# Patient Record
Sex: MALE | Race: WHITE | NOT HISPANIC OR LATINO | Employment: PART TIME | URBAN - METROPOLITAN AREA
[De-identification: names, ages, dates, MRNs, and addresses within clinical notes are randomized per-mention and may not be internally consistent; named-entity substitution may affect disease eponyms.]

---

## 2017-01-03 ENCOUNTER — GENERIC CONVERSION - ENCOUNTER (OUTPATIENT)
Dept: OTHER | Facility: OTHER | Age: 62
End: 2017-01-03

## 2017-01-10 ENCOUNTER — HOSPITAL ENCOUNTER (OUTPATIENT)
Dept: MRI IMAGING | Facility: HOSPITAL | Age: 62
Discharge: HOME | End: 2017-01-10
Attending: PHYSICAL MEDICINE & REHABILITATION
Payer: COMMERCIAL

## 2017-01-10 DIAGNOSIS — R29.90 UNSPECIFIED SYMPTOMS AND SIGNS INVOLVING THE NERVOUS SYSTEM: ICD-10-CM

## 2017-01-10 PROCEDURE — 72158 MRI LUMBAR SPINE W/O & W/DYE: CPT

## 2017-01-10 PROCEDURE — A9585 GADOBUTROL INJECTION: HCPCS | Performed by: PHYSICAL MEDICINE & REHABILITATION

## 2017-01-10 RX ADMIN — GADOBUTROL 8 ML: 604.72 INJECTION INTRAVENOUS at 23:03

## 2017-01-11 ENCOUNTER — GENERIC CONVERSION - ENCOUNTER (OUTPATIENT)
Dept: OTHER | Facility: OTHER | Age: 62
End: 2017-01-11

## 2017-01-20 ENCOUNTER — ALLSCRIPTS OFFICE VISIT (OUTPATIENT)
Dept: OTHER | Facility: OTHER | Age: 62
End: 2017-01-20

## 2017-01-24 ENCOUNTER — ALLSCRIPTS OFFICE VISIT (OUTPATIENT)
Dept: OTHER | Facility: OTHER | Age: 62
End: 2017-01-24

## 2017-01-31 ENCOUNTER — GENERIC CONVERSION - ENCOUNTER (OUTPATIENT)
Dept: OTHER | Facility: OTHER | Age: 62
End: 2017-01-31

## 2017-02-10 ENCOUNTER — ALLSCRIPTS OFFICE VISIT (OUTPATIENT)
Dept: RADIOLOGY | Facility: CLINIC | Age: 62
End: 2017-02-10
Payer: COMMERCIAL

## 2017-02-15 DIAGNOSIS — M79.605 PAIN OF LEFT LEG: ICD-10-CM

## 2017-02-15 DIAGNOSIS — G89.4 CHRONIC PAIN SYNDROME: ICD-10-CM

## 2017-02-15 DIAGNOSIS — M06.9 RHEUMATOID ARTHRITIS (HCC): ICD-10-CM
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
## 2017-02-15 DIAGNOSIS — M79.604 PAIN OF RIGHT LEG: ICD-10-CM

## 2017-02-15 DIAGNOSIS — M62.838 OTHER MUSCLE SPASM: ICD-10-CM

## 2017-02-22 ENCOUNTER — GENERIC CONVERSION - ENCOUNTER (OUTPATIENT)
Dept: OTHER | Facility: OTHER | Age: 62
End: 2017-02-22

## 2017-03-10 ENCOUNTER — ALLSCRIPTS OFFICE VISIT (OUTPATIENT)
Dept: OTHER | Facility: OTHER | Age: 62
End: 2017-03-10

## 2017-03-21 ENCOUNTER — GENERIC CONVERSION - ENCOUNTER (OUTPATIENT)
Dept: OTHER | Facility: OTHER | Age: 62
End: 2017-03-21

## 2017-03-28 ENCOUNTER — GENERIC CONVERSION - ENCOUNTER (OUTPATIENT)
Dept: OTHER | Facility: OTHER | Age: 62
End: 2017-03-28

## 2017-03-29 ENCOUNTER — ALLSCRIPTS OFFICE VISIT (OUTPATIENT)
Dept: OTHER | Facility: OTHER | Age: 62
End: 2017-03-29

## 2017-03-29 DIAGNOSIS — M54.16 RADICULOPATHY OF LUMBAR REGION: ICD-10-CM

## 2017-04-04 ENCOUNTER — ALLSCRIPTS OFFICE VISIT (OUTPATIENT)
Dept: OTHER | Facility: OTHER | Age: 62
End: 2017-04-04

## 2017-04-04 ENCOUNTER — GENERIC CONVERSION - ENCOUNTER (OUTPATIENT)
Dept: OTHER | Facility: OTHER | Age: 62
End: 2017-04-04

## 2017-04-14 ENCOUNTER — ALLSCRIPTS OFFICE VISIT (OUTPATIENT)
Dept: RADIOLOGY | Facility: CLINIC | Age: 62
End: 2017-04-14
Payer: COMMERCIAL

## 2017-04-21 ENCOUNTER — GENERIC CONVERSION - ENCOUNTER (OUTPATIENT)
Dept: OTHER | Facility: OTHER | Age: 62
End: 2017-04-21

## 2017-04-26 ENCOUNTER — GENERIC CONVERSION - ENCOUNTER (OUTPATIENT)
Dept: OTHER | Facility: OTHER | Age: 62
End: 2017-04-26

## 2017-05-08 ENCOUNTER — ALLSCRIPTS OFFICE VISIT (OUTPATIENT)
Dept: OTHER | Facility: OTHER | Age: 62
End: 2017-05-08

## 2017-05-30 ENCOUNTER — ALLSCRIPTS OFFICE VISIT (OUTPATIENT)
Dept: OTHER | Facility: OTHER | Age: 62
End: 2017-05-30

## 2017-06-09 ENCOUNTER — ALLSCRIPTS OFFICE VISIT (OUTPATIENT)
Dept: OTHER | Facility: OTHER | Age: 62
End: 2017-06-09

## 2017-06-09 ENCOUNTER — HOSPITAL ENCOUNTER (OUTPATIENT)
Dept: RADIOLOGY | Facility: MEDICAL CENTER | Age: 62
Discharge: HOME | End: 2017-06-09
Payer: COMMERCIAL

## 2017-06-09 ENCOUNTER — TRANSCRIBE ORDERS (OUTPATIENT)
Dept: ADMINISTRATIVE | Facility: HOSPITAL | Age: 62
End: 2017-06-09

## 2017-06-09 DIAGNOSIS — R07.81 PLEURODYNIA: ICD-10-CM

## 2017-06-09 DIAGNOSIS — S49.92XA UNSPECIFIED INJURY OF LEFT SHOULDER AND UPPER ARM, INITIAL ENCOUNTER: ICD-10-CM

## 2017-06-09 PROCEDURE — 71101 X-RAY EXAM UNILAT RIBS/CHEST: CPT

## 2017-06-13 ENCOUNTER — GENERIC CONVERSION - ENCOUNTER (OUTPATIENT)
Dept: OTHER | Facility: OTHER | Age: 62
End: 2017-06-13

## 2017-06-22 ENCOUNTER — GENERIC CONVERSION - ENCOUNTER (OUTPATIENT)
Dept: OTHER | Facility: OTHER | Age: 62
End: 2017-06-22

## 2017-07-03 ENCOUNTER — GENERIC CONVERSION - ENCOUNTER (OUTPATIENT)
Dept: OTHER | Facility: OTHER | Age: 62
End: 2017-07-03

## 2017-07-03 ENCOUNTER — APPOINTMENT (OUTPATIENT)
Dept: RADIOLOGY | Age: 62
End: 2017-07-03
Attending: FAMILY MEDICINE
Payer: COMMERCIAL

## 2017-07-03 ENCOUNTER — OFFICE VISIT (OUTPATIENT)
Dept: URGENT CARE | Age: 62
End: 2017-07-03
Payer: COMMERCIAL

## 2017-07-03 DIAGNOSIS — S49.92XA UNSPECIFIED INJURY OF LEFT SHOULDER AND UPPER ARM, INITIAL ENCOUNTER: ICD-10-CM

## 2017-07-03 PROCEDURE — 73060 X-RAY EXAM OF HUMERUS: CPT

## 2017-07-03 PROCEDURE — 73030 X-RAY EXAM OF SHOULDER: CPT

## 2017-07-03 PROCEDURE — S9083 URGENT CARE CENTER GLOBAL: HCPCS | Performed by: FAMILY MEDICINE

## 2017-07-03 PROCEDURE — G0383 LEV 4 HOSP TYPE B ED VISIT: HCPCS | Performed by: FAMILY MEDICINE

## 2017-07-07 ENCOUNTER — ALLSCRIPTS OFFICE VISIT (OUTPATIENT)
Dept: RADIOLOGY | Facility: CLINIC | Age: 62
End: 2017-07-07
Payer: COMMERCIAL

## 2017-07-13 ENCOUNTER — GENERIC CONVERSION - ENCOUNTER (OUTPATIENT)
Dept: OTHER | Facility: OTHER | Age: 62
End: 2017-07-13

## 2017-07-17 ENCOUNTER — GENERIC CONVERSION - ENCOUNTER (OUTPATIENT)
Dept: OTHER | Facility: OTHER | Age: 62
End: 2017-07-17

## 2017-08-10 ENCOUNTER — ALLSCRIPTS OFFICE VISIT (OUTPATIENT)
Dept: OTHER | Facility: OTHER | Age: 62
End: 2017-08-10

## 2017-08-10 DIAGNOSIS — Z79.899 OTHER LONG TERM (CURRENT) DRUG THERAPY: ICD-10-CM

## 2017-08-10 DIAGNOSIS — M05.79 RHEUMATOID ARTHRITIS OF MULTIPLE SITES WITHOUT ORGAN OR SYSTEM INVOLVEMENT WITH POSITIVE RHEUMATOID FACTOR (HCC): ICD-10-CM

## 2017-08-15 ENCOUNTER — ALLSCRIPTS OFFICE VISIT (OUTPATIENT)
Dept: OTHER | Facility: OTHER | Age: 62
End: 2017-08-15

## 2017-08-18 ENCOUNTER — TRANSCRIBE ORDERS (OUTPATIENT)
Dept: ADMINISTRATIVE | Facility: HOSPITAL | Age: 62
End: 2017-08-18

## 2017-08-18 ENCOUNTER — HOSPITAL ENCOUNTER (OUTPATIENT)
Dept: RADIOLOGY | Facility: HOSPITAL | Age: 62
Discharge: HOME | End: 2017-08-18
Payer: COMMERCIAL

## 2017-08-18 ENCOUNTER — GENERIC CONVERSION - ENCOUNTER (OUTPATIENT)
Dept: OTHER | Facility: OTHER | Age: 62
End: 2017-08-18

## 2017-08-18 DIAGNOSIS — M05.79 RHEUMATOID ARTHRITIS OF MULTIPLE SITES WITHOUT ORGAN OR SYSTEM INVOLVEMENT WITH POSITIVE RHEUMATOID FACTOR (HCC): ICD-10-CM

## 2017-08-18 PROCEDURE — 71020 HB CHEST X-RAY 2VW FRONTAL&LATL: CPT

## 2017-08-23 ENCOUNTER — GENERIC CONVERSION - ENCOUNTER (OUTPATIENT)
Dept: OTHER | Facility: OTHER | Age: 62
End: 2017-08-23

## 2017-09-13 ENCOUNTER — ALLSCRIPTS OFFICE VISIT (OUTPATIENT)
Dept: OTHER | Facility: OTHER | Age: 62
End: 2017-09-13

## 2017-09-13 DIAGNOSIS — Z79.899 OTHER LONG TERM (CURRENT) DRUG THERAPY: ICD-10-CM

## 2017-09-13 DIAGNOSIS — R76.11 NONSPECIFIC REACTION TO TUBERCULIN SKIN TEST WITHOUT ACTIVE TUBERCULOSIS(795.51): ICD-10-CM

## 2017-09-13 DIAGNOSIS — G89.4 CHRONIC PAIN SYNDROME: ICD-10-CM

## 2017-09-13 DIAGNOSIS — F11.20 UNCOMPLICATED OPIOID DEPENDENCE (HCC): ICD-10-CM

## 2017-09-18 ENCOUNTER — TRANSCRIBE ORDERS (OUTPATIENT)
Dept: ADMINISTRATIVE | Facility: HOSPITAL | Age: 62
End: 2017-09-18

## 2017-09-18 DIAGNOSIS — M96.1 POSTLAMINECTOMY SYNDROME, UNSPECIFIED REGION: Primary | ICD-10-CM

## 2017-09-18 DIAGNOSIS — M47.812 CERVICAL SPONDYLOSIS WITHOUT MYELOPATHY: ICD-10-CM

## 2017-09-29 ENCOUNTER — HOSPITAL ENCOUNTER (OUTPATIENT)
Dept: RADIOLOGY | Facility: HOSPITAL | Age: 62
Discharge: HOME | End: 2017-09-29
Attending: ANESTHESIOLOGY
Payer: COMMERCIAL

## 2017-09-29 ENCOUNTER — ALLSCRIPTS OFFICE VISIT (OUTPATIENT)
Dept: OTHER | Facility: OTHER | Age: 62
End: 2017-09-29

## 2017-09-29 DIAGNOSIS — M96.1 POSTLAMINECTOMY SYNDROME, UNSPECIFIED REGION: ICD-10-CM

## 2017-09-29 PROCEDURE — A9585 GADOBUTROL INJECTION: HCPCS | Performed by: ANESTHESIOLOGY

## 2017-09-29 PROCEDURE — 72158 MRI LUMBAR SPINE W/O & W/DYE: CPT

## 2017-09-29 RX ADMIN — GADOBUTROL 8 ML: 604.72 INJECTION INTRAVENOUS at 14:05

## 2017-10-03 ENCOUNTER — GENERIC CONVERSION - ENCOUNTER (OUTPATIENT)
Dept: OTHER | Facility: OTHER | Age: 62
End: 2017-10-03

## 2017-10-05 ENCOUNTER — GENERIC CONVERSION - ENCOUNTER (OUTPATIENT)
Dept: OTHER | Facility: OTHER | Age: 62
End: 2017-10-05

## 2017-10-09 ENCOUNTER — GENERIC CONVERSION - ENCOUNTER (OUTPATIENT)
Dept: OTHER | Facility: OTHER | Age: 62
End: 2017-10-09

## 2017-10-10 ENCOUNTER — ALLSCRIPTS OFFICE VISIT (OUTPATIENT)
Dept: RADIOLOGY | Facility: CLINIC | Age: 62
End: 2017-10-10
Payer: COMMERCIAL

## 2017-10-12 ENCOUNTER — GENERIC CONVERSION - ENCOUNTER (OUTPATIENT)
Dept: OTHER | Facility: OTHER | Age: 62
End: 2017-10-12

## 2017-10-13 RX ORDER — ACETAMINOPHEN 325 MG/1
650 TABLET ORAL ONCE
Status: COMPLETED | OUTPATIENT
Start: 2017-10-16 | End: 2017-10-16

## 2017-10-13 RX ORDER — DIPHENHYDRAMINE HCL 25 MG
25 TABLET ORAL ONCE
Status: COMPLETED | OUTPATIENT
Start: 2017-10-16 | End: 2017-10-16

## 2017-10-13 RX ORDER — SODIUM CHLORIDE 9 MG/ML
20 INJECTION, SOLUTION INTRAVENOUS CONTINUOUS
Status: DISCONTINUED | OUTPATIENT
Start: 2017-10-16 | End: 2017-10-19 | Stop reason: HOSPADM

## 2017-10-16 ENCOUNTER — HOSPITAL ENCOUNTER (OUTPATIENT)
Dept: INFUSION CENTER | Facility: CLINIC | Age: 62
Discharge: HOME | End: 2017-10-16
Payer: COMMERCIAL

## 2017-10-16 VITALS
BODY MASS INDEX: 26.38 KG/M2 | TEMPERATURE: 98 F | SYSTOLIC BLOOD PRESSURE: 136 MMHG | RESPIRATION RATE: 18 BRPM | HEART RATE: 95 BPM | WEIGHT: 173.5 LBS | DIASTOLIC BLOOD PRESSURE: 93 MMHG

## 2017-10-16 PROCEDURE — 96413 CHEMO IV INFUSION 1 HR: CPT

## 2017-10-16 RX ORDER — OXYCODONE HYDROCHLORIDE AND ACETAMINOPHEN 5; 325 MG/1; MG/1
1 TABLET ORAL EVERY 4 HOURS PRN
COMMUNITY
End: 2018-05-04 | Stop reason: DRUGHIGH

## 2017-10-16 RX ORDER — ORPHENADRINE CITRATE 100 MG/1
100 TABLET, EXTENDED RELEASE ORAL 3 TIMES DAILY
COMMUNITY
End: 2018-03-07 | Stop reason: SDUPTHER

## 2017-10-16 RX ORDER — ESOMEPRAZOLE MAGNESIUM 40 MG/1
40 CAPSULE, DELAYED RELEASE ORAL
COMMUNITY
End: 2018-02-17 | Stop reason: SDUPTHER

## 2017-10-16 RX ORDER — MULTIVITAMIN WITH IRON
1100 TABLET ORAL
COMMUNITY
End: 2021-01-19

## 2017-10-16 RX ORDER — UBIDECARENONE 75 MG
CAPSULE ORAL DAILY
COMMUNITY

## 2017-10-16 RX ORDER — GABAPENTIN 600 MG/1
1800 TABLET ORAL
COMMUNITY

## 2017-10-16 RX ORDER — PREDNISONE 2.5 MG
5 TABLET ORAL DAILY
COMMUNITY
End: 2018-10-04 | Stop reason: DRUGHIGH

## 2017-10-16 RX ORDER — CELECOXIB 200 MG/1
200 CAPSULE ORAL 2 TIMES DAILY
COMMUNITY

## 2017-10-16 RX ORDER — LANOLIN ALCOHOL/MO/W.PET/CERES
1 CREAM (GRAM) TOPICAL 3 TIMES DAILY
COMMUNITY

## 2017-10-16 RX ADMIN — ACETAMINOPHEN 650 MG: 325 TABLET, FILM COATED ORAL at 13:47

## 2017-10-16 RX ADMIN — SODIUM CHLORIDE 20 ML/HR: 0.9 INJECTION, SOLUTION INTRAVENOUS at 13:46

## 2017-10-16 RX ADMIN — TOCILIZUMAB 310 MG: 20 INJECTION, SOLUTION, CONCENTRATE INTRAVENOUS at 14:32

## 2017-10-16 RX ADMIN — DIPHENHYDRAMINE HCL 25 MG: 25 TABLET ORAL at 13:46

## 2017-10-16 NOTE — PROGRESS NOTES
Pt received treatment without complications  Discharged in stable condition  Next appointment scheduled in 4 weeks  AVS provided

## 2017-10-16 NOTE — PLAN OF CARE
Problem: Potential for Falls  Goal: Patient will remain free of falls  INTERVENTIONS:  - Assess patient frequently for physical needs  -  Identify cognitive and physical deficits and behaviors that affect risk of falls    -  [place] fall precautions as indicated by assessment   - Educate patient/family on patient safety including physical limitations  - Instruct patient to call for assistance with activity based on assessment  - Modify environment to reduce risk of injury  - Consider OT/PT consult to assist with strengthening/mobility   Outcome: Progressing

## 2017-10-16 NOTE — PROGRESS NOTES
Pt relayed extensive story regarding positive TB result  Stating that he was cleared by all doctors after having repeat negative TB test result  Labs were performed in August and September, yet pt was delayed in receiving his first treatment until now due to pt being cleared by physicians following negative TB result  Physician note states that he opted for subcutaneous injections every two weeks, yet pt states that after trying to obtain authorizations, they opted to receive infusions instead  Pt ok for treatment today

## 2017-10-26 ENCOUNTER — GENERIC CONVERSION - ENCOUNTER (OUTPATIENT)
Dept: OTHER | Facility: OTHER | Age: 62
End: 2017-10-26

## 2017-11-13 RX ORDER — ACETAMINOPHEN 325 MG/1
650 TABLET ORAL ONCE
Status: DISCONTINUED | OUTPATIENT
Start: 2017-11-14 | End: 2017-11-17 | Stop reason: HOSPADM

## 2017-11-13 RX ORDER — SODIUM CHLORIDE 9 MG/ML
20 INJECTION, SOLUTION INTRAVENOUS CONTINUOUS
Status: DISCONTINUED | OUTPATIENT
Start: 2017-11-14 | End: 2017-11-17 | Stop reason: HOSPADM

## 2017-11-13 RX ORDER — DIPHENHYDRAMINE HCL 25 MG
25 TABLET ORAL ONCE
Status: DISCONTINUED | OUTPATIENT
Start: 2017-11-14 | End: 2017-11-17 | Stop reason: HOSPADM

## 2017-11-14 ENCOUNTER — HOSPITAL ENCOUNTER (OUTPATIENT)
Dept: INFUSION CENTER | Facility: CLINIC | Age: 62
Discharge: HOME | End: 2017-11-14
Payer: COMMERCIAL

## 2017-11-21 ENCOUNTER — GENERIC CONVERSION - ENCOUNTER (OUTPATIENT)
Dept: OTHER | Facility: OTHER | Age: 62
End: 2017-11-21

## 2017-11-30 ENCOUNTER — GENERIC CONVERSION - ENCOUNTER (OUTPATIENT)
Dept: OTHER | Facility: OTHER | Age: 62
End: 2017-11-30

## 2017-12-14 ENCOUNTER — HOSPITAL ENCOUNTER (OUTPATIENT)
Dept: RADIOLOGY | Age: 62
Discharge: HOME | End: 2017-12-14
Payer: COMMERCIAL

## 2017-12-14 ENCOUNTER — GENERIC CONVERSION - ENCOUNTER (OUTPATIENT)
Dept: OTHER | Facility: OTHER | Age: 62
End: 2017-12-14

## 2017-12-14 DIAGNOSIS — M47.812 CERVICAL SPONDYLOSIS WITHOUT MYELOPATHY: ICD-10-CM

## 2017-12-14 PROCEDURE — 72141 MRI NECK SPINE W/O DYE: CPT

## 2017-12-15 ENCOUNTER — GENERIC CONVERSION - ENCOUNTER (OUTPATIENT)
Dept: FAMILY MEDICINE CLINIC | Facility: CLINIC | Age: 62
End: 2017-12-15

## 2018-01-09 NOTE — PROGRESS NOTES
Assessment    1  Abnormal neurological exam (781 99) (R29 90)   2  Spondylolisthesis, grade 1 (738 4) (M43 10)    Plan  Abnormal neurological exam, Analgesic use, Chronic pain syndrome, Spondylolisthesis,  grade 1    · 1 - Jenna Mao MD (Pain Management) Physician Referral  Evaluate and treat, new  injury possible LESI and needs follow up , patient had been actvely managed by Dr Tremaine Castaneda  Status: Active  Requested for: 20Jan2017  Care Summary provided  : Yes  Levoscoliosis, Spondylolisthesis, grade 1    · Follow-up visit in 6 weeks Evaluation and Treatment  Follow-up  Status: Hold For -  Scheduling  Requested for: 20Jan2017    Discussion/Summary  Impression: degenerative disc disease of the lumbar spine and spondylolisthesis  Currently, the condition is unchanged  There are no changes in medication management  Per Pain Medicine Treatment plan includes pain medicine consultation and may benefit from epidural injections which should be easier this tyime after prior laminectomy  Patient discussion: discussed with the patient, discussed with the patient's family  Chief Complaint  Follow up for post op lumbar spine rehab   12/25 last seen Dec 2015 and D/Cd to PRN care  Here for back  1/20 /17 follow up after MRI  History of Present Illness  62 yo male with greater than one month right buttock radiating down to knee, new numbness and tingling in L4 distribution  LISBETH 5/12/2015  today 8/20 has had surgery Dr Lavell Cabral Ashlie 15, 2015, L-2 thru L-5  Has weaned from 10 mg oxycodones to 5 mg, taking 4 a day  Referred for rehab after surgery by Dr Jesica Camp  9/17 follow up   takes opioid four times a day with supplemental APAP  Working part time at [employer]  10/15 state he does HEP per PT  Leg feels stronger  Works three half days  No radicular Sx  some longstanding numbness in toes (multiple surgeries  Analgesics four times a day, no constipation, no drowsiness     12/3 follow for right upper lumbar radiculopathy due to HNP, surgery was successful Ashlie 15,   Pt  and his wife here today with ongoing concern for ongoing multiple sites of pain, wants a pain Medicine consult   sent in past by PCP to pain Medicine but not seen   on gabapentin for "restless legs" at HS only  Is taking one tramadol in am, oxycodone at noon, evening meal tramadol and then oxycodone at HS  Prior to surgery from Dr Debra Ferrera was taking tramadol 50 mg one tab TID  Now taking left sided sciatic pain for a week, feels tight , uses hot tub for stretching  Never had post op PT   had one visit and told HEP adequate  He thinks his femoral component has shifted in last week     12/25 re-eval feels he is "leaning to right " for about two months   denies pain avoidance  thought was van seat  unsure if discs or muscles  getting some right LBP and lateral hip pain, pain around surgical site from prior lami  Denies any Sx below knees and no N/T or BBI  Still treats with Pain Medicine and Claudia Álvarez of Rheumatology  Feels he is no longer able to lift 25 # dehumidifier   tolerates 15 # max   gets help with work  In August had a fall and trauma eval at [facility] and thoracic and lumbar spine films without fracture  1/20/17 follow up after MRI   does not have follow-up with Pain Medicine and is needing more analgesics, had been on chronic tramadol from Rheumatology prior to pain medicine  Is awaiting a new motorized scooter thru [facility] clinic  Review of Systems    Musculoskeletal: as noted in HPI  ROS reviewed  Active Problems    1  Abnormal neurological exam (781 99) (R29 90)   2  Acute hip pain (719 45) (M25 559)   3  Acute upper respiratory infection (465 9) (J06 9)   4  Analgesic use (V58 69) (Z79 899)   5  Bruise (924 9) (T14 8)   6  Chronic pain syndrome (338 4) (G89 4)   7  Chronic steroid use (V58 65)   8  DDD (degenerative disc disease), lumbosacral (722 52) (M51 37)   9  Depression screen (V79 0) (Z13 89)   10   Encounter for screening for malignant neoplasm of colon (V76 51) (Z12 11)   11  Esophageal reflux (530 81) (K21 9)   12  Fall with injury (959 9,E888 9) (W19 XXXA)   15  Left ankle swelling (719 07) (M25 472)   14  Left leg swelling (729 81) (M79 89)   15  Leg hematoma (924 5) (S80 10XA)   16  Levoscoliosis (737 39) (M41 80)   17  Low back pain (724 2) (M54 5)   18  Multiple joint pain (719 49) (M25 50)   19  Myalgia (729 1) (M79 1)   20  Neoplasm of skin (239 2) (D49 2)   21  Opioid dependence (304 00) (F11 20)   22  Other muscle spasm (728 85) (M62 838)   23  Pain in both lower extremities (729 5) (M79 604,M79 605)   24  Reactive airway disease (493 90) (J45 909)   25  Restless legs syndrome (333 94) (G25 81)   26  Rheumatoid arthritis (714 0) (M06 9)   27  Skin rash (782 1) (R21)   28  Sleep apnea (780 57) (G47 30)   29  Visual disturbances (368 9) (H53 9)    Past Medical History    1  History of Acute right lumbar radiculopathy (724 4) (M54 16)   2  History of arthritis (V13 4) (Z87 39)   3  History of esophageal reflux (V12 79) (Z87 19)   4  History of Nonvenomous Insect Bite Of Right Upper Arm (912 4)   5  History of Visit For: Screening Exam Parasitic Infections (V75 8)    The active problems and past medical history were reviewed and updated today  Surgical History    1  History of Foot Surgery   2  History of Knee Surgery   3  History of Palatopharyngoplasty   4  History of Peripheral Nerve Block Wrist Radial Right   5  History of Sinus Surgery   6  History of Tonsillectomy With Adenoidectomy   7  History of Umbilical Hernia Repair    The surgical history was reviewed and updated today  Family History  Mother    1  Family history of congestive heart failure (V17 49) (Z82 49)  Father    2  Family history of transient ischemic attacks (V17 1) (Z82 3)  Family History    3   Family history of cerebrovascular accident (V17 1) (Z82 3)    Social History    · Alcohol use (V49 89) (Z78 9)   · Being A Social Drinker   · Drinks coffee   · Never A Smoker   · No drug use    Current Meds   1  B-Complex TABS; Therapy: (Recorded:07Nov2012) to Recorded   2  Celecoxib 200 MG Oral Capsule; TAKE 1 CAPSULE DAILY AS NEEDED; Last   Rx:10Jun2015 Ordered   3  Collagen Hydrolysate Powder; Therapy: (Recorded:55Ntk3482) to Recorded   4  Enbrel 50 MG/ML SOLN; INJECT 50MG SUBCUTANEOUSLY WEEKLY Recorded   5  Endocet 5-325 MG Oral Tablet; TAKE 1 TABLET EVERY 5- 6 HOURS; Therapy: 30Kpt9957 to (Evaluate:40Lrv8488); Last Rx:20Oct2016 Ordered   6  Esomeprazole Magnesium 40 MG Oral Capsule Delayed Release; TAKE 1 CAPSULE   DAILY AS NEEDED FOR GASTROESOPHAGEAL REFLUX DISEASE; Therapy: 08NZR0193 to (Last KI:93JRE7998)  Requested for: 49GDY9571 Ordered   7  Gabapentin 600 MG Oral Tablet; Therapy: (Recorded:77Jcl6798) to Recorded   8  Glucosamine TABS; Therapy: (0523-0392656) to Recorded   9  Orphenadrine Citrate  MG Oral Tablet Extended Release 12 Hour; TAKE 1   TABLET THREE TIMES A DAY AS NEEDED FOR MUSCLE SPASM; Therapy: 52RJR4029 to (Evaluate:53Cin4012)  Requested for: 05DPJ7812; Last   Rx:33Nyq2310 Ordered   10  Sleep Oral Capsule; Therapy: 09RFJ6203 to Recorded   11  Vitamin B12 TABS; Therapy: (0523-9660272) to Recorded    The medication list was reviewed and updated today  Allergies    1  Morphine Derivatives   2  Penicillins   3  Sulfa Drugs    Vitals  Signs   Recorded: 20Jan2017 04:12PM   Heart Rate: 84  Systolic: 802  Diastolic: 80  Height: 5 ft 10 in  Weight: 172 lb   BMI Calculated: 24 68  BSA Calculated: 1 96    Physical Exam    Constitutional   General appearance: Abnormal   chronically ill  Lumbar/Sacral Spine examination demonstrates Lumbosacral Spine:   Evaluation of Muscle Stretch Reflexes on the right side demonstrates 1/4 Hamstring Reflex, 0/4 Knee Jerk Reflex and 1/4 Ankle Jerk Reflex, but negative right ankle clonus     Evaluation of Muscle Stretch Reflexes on the left side demonstrates 1/4 Hamstring Reflex, 1/4 Knee Jerk Reflex and negative left ankle clonus  Special Tests: negative Straight Leg Raise on right and negative Straight Leg Raise on left  Results/Data    Diagnostic Review multilevel DDD, no root compression, slight retrolisthesis at L-4/5   did not have fusion        Signatures   Electronically signed by : Jonathan Yi DO; Jan 20 2017  4:47PM EST                       (Author)

## 2018-01-09 NOTE — RESULT NOTES
Verified Results  (1) VITAMIN D 25-HYDROXY 29Dec2016 03:20PM [employer] Order Number: [id number]     Test Name Result Flag Reference   VIT D 25-HYDROX 50 1 ng/mL  30 0-100 0   This assay is a certified procedure of the CDC Vitamin D Standardization Certification Program (VDSCP)     Deficiency <20ng/ml   Insufficiency 20-30ng/ml   Sufficient  ng/ml     *Patients undergoing fluorescein dye angiography may retain small amounts of fluorescein in the body for 48-72 hours post procedure  Samples containing fluorescein can produce falsely elevated Vitamin D values  If the patient had this procedure, a specimen should be resubmitted post fluorescein clearance       (1) CK (CPK) 29Dec2016 03:20PM [employer] Order Number: [id number]     Test Name Result Flag Reference   CK (CPK) 203 U/L     CK MB FRACTION 5 1 ng/mL H 0 0-5 0   CK-MB INDEX 2 5 %  0 0-2 5

## 2018-01-09 NOTE — RESULT NOTES
Message   Recorded as Task   Date: 06/22/2016 01:37 PM, Created By: Pratima Churchill   Task Name: Follow Up   Assigned To: [facility]   Regarding Patient: Emily Hoff, Status: In Progress   Comment:    Sachi Rihcardsy - 22 Jun 2016 1:37 PM     TASK CREATED  Caller: Self; General Medical Question; [phone] (Home); [phone] (Work)  Received a TC from the pt  today stating [facility] the [facility] from [facility] for the Gabapentin 300mg at HS  His previous doctor Dr Clyda Schlatter? previously ordered it  He would like HD to continue to order  Does his PCP have to cancel the order and you order it  How do we fix this? Renee Elmore - 22 Jun 2016 2:40 PM     TASK REPLIED TO: Previously Assigned To [facility]  Other than order it myself, I cannot change anything  Can you please check with pharmacist why I cannot take over prescription   Fransisca Richards - 22 Jun 2016 3:09 PM     TASK EDITED  S/w [facility] pharmacy and it looks like the gabapentin 100mg tablets were ordered  Attempted to call pt  and clarify exactly how much gabapentin he is taking  Need to call [employer] and see why insurance denied gabapentin at hs  Fransisca Richards - 22 Jun 2016 3:09 PM     TASK IN PROGRESS   Fransisca Richards - 22 Jun 2016 4:21 PM     TASK EDITED  Received a vm from the pt  today stating he needs a new script from Dr Rodolfo Tadeo in regards to his HS gabapentin sent to his mailorder to cancel out the previous doctor  [facility] to advise  Thanks   Renee Elmore - 22 Jun 2016 4:32 PM     TASK REPLIED TO: Previously Assigned To Renee Elmore  Sent to Mail order   Fransisca Richards - 23 Jun 2016 12:03 PM     TASK EDITED  S/w the pt  and the problem lies with Dr Sondra Tai order of the gabapentin  The pt  takes 8- 100mg tablets of gabapentin QD, and 3- 300mg tablets of the Gabapentin QD  The pt  states he has no more of the 100mg tablets left  I guess there was a problem with the insurance because of the two providers ordering   I know the 300mg tablets were ordered through mail order but in the meantime the pt has none of the 100mg tablets left  Could you please order the 100mg tablets to the local pharmacy because Dr Crys Clinton is off today  Thanks   Diane Ram - 23 Jun 2016 1:51 PM     TASK REPLIED TO: Previously Assigned To Diane Ram  sure  I will take care of it  sent   Jacquie Chen - 23 Jun 2016 1:55 PM     TASK REPLIED TO: Previously Assigned To [facility]  Spoke with pt and advised of the same,verbalizes understanding  Signatures   Electronically signed by :  Wilfrid Amos, ; Jun 23 2016  1:55PM EST                       (Author)

## 2018-01-09 NOTE — MISCELLANEOUS
Message   Recorded as Task   Date: 01/15/2016 03:00 PM, Created By: Karla Forbes   Task Name: Follow Up   Assigned To: [facility]   Regarding Patient: Jarett Reza, Status: Active   Comment:    Jacquie Chen - 15 Lenny 2016 3:00 PM     TASK CREATED  Caller: Self; Other; [phone] (Mobile Phone); [phone] (Work)  Tc from pt who states he just left our office and has script for nucynta 200mg ER q12hrs  States he has called 6 different pharmacies and noone has this medication in stock and that they may be on back order x 2 weeks  Pt states he has 4 days worth left of nucynta  [facility] do not [facility] any 200mg and only have #10 tabs of the 100mg  I called [facility] pharmacy-they have no 200mg in stock,states pt would have to order and they do not know when it would be delivered  They do have #74 tabs of nucynta 100mg ER  Please advise  Haroon Gomez - 15 Lenny 2016 3:58 PM     TASK REPLIED TO: Previously Assigned To [facility]  Please ask patient to call [phone]  They can help him locate pharmacy which can order the medication for him sooner  In the meantime he can can use 100 mg ER 2 tabs Q12  Jacquie Chen - 15 Lenny 2016 4:10 PM     TASK REPLIED TO: Previously Assigned To [facility]  Ok  Pt will need script for 100mg correct? Haroon Gomez - 18 Jan 2016 9:23 AM     TASK REPLIED TO: Previously Assigned To Renee Maria  Can you please check with patient if he was able to locate the Nucynta  200 mg? [address] - 18 Jan 2016 10:46 AM     TASK EDITED  S/W pt; he said he was told by the pharmacist at [facility] and at another [facility] pharmacy that the nucynta ER 200mg have been on back order and Q 2wks the back order date gets pushed back another 2 wks, no one seems to know when the nucynta ER 200mg will actually be available      Pt said he has enough of his nucynta ER 50mg and 150mg ER for 4 more days yet, he takes ER 150mg Q12hrs  He will need more by fri  Pt doesn't have any 100mg ER nucynta, said he never did  Please advise  Renee Maria - 18 Jan 2016 12:40 PM     TASK REPLIED TO: Previously Assigned To Renee Maria  I will prescribe Nucynta 100 mg ER Q12, he can take 2 tabs Q12  In the mean time, they can order the 200 mg ER  Guerline Bobo - 18 Jan 2016 1:11 PM     TASK EDITED  Left vm on pt's cell (as per his request)   that Dr Alana Barrios will prescribe Nucynta 100mg ER, and he's to take 2 tabs Q12hrs and in the meantime his pharmacy can order the nucynta 200mg ER  Script can be p/u between 8-4pm M-F at the [facility] office  If pt has any questions he's to call the [facility] office  Guerline Bobo - 18 Jan 2016 1:11 PM     TASK IN PROGRESS   Saumya Harrison - 18 Jan 2016 3:00 PM     TASK EDITED    Pt LM on [facility] triage line today at , states that Neisha Rodriguez will be able to get #30 pills, states he is not sure about the next script  States that should work for now  States if they do not have the other 30, he will call [facility] for the 100's  **********   Renee Maria - 18 Jan 2016 3:41 PM     TASK REPLIED TO: Previously Assigned To Sabino Stiles  Thank you  He should just remind us about only picking up 30 tabs and phamacy should document that  Efrain Mueller - 18 Jan 2016 4:20 PM     TASK EDITED  LMOM to cb  Efrain Mueller - 18 Jan 2016 4:20 PM     TASK IN PROGRESS   Guerline Bobo - 20 Jan 2016 11:34 AM     TASK EDITED  Pt left vm 1/20 at 1055 that Dr Alana Barrios needs to get this message and get back to him and she has to call [address]  He said the Nucynta referral service screwed up and said the other location  had the 200mg but they only have the 100mg  Pt said Dr Alana Barrios has to rewrite for the nucynta 100mg and [facility], then they can have it for him for tomorrow  Pt said he only has enough through tomorrow night and would be out by fri morning  Guerline Bobo - 20 Jan 2016 11:40 AM     TASK EDITED  Addendum: pt called back and said that Obed at [facility] told pt that if Dr Shady Pena wrote the script and she called them directly they would get the nucynta ordered today so it would come by tomorrow  Pt aware his wife will have to come to the [facility] office to p/u a new script and take it to [facility], he's aware she'll have to p/u Rx by 4pm    Pt told we will call his cell, ok to leave vm once Rx ready for p/u  Renee Maria - 20 Jan 2016 11:45 AM     TASK REPLIED TO: Previously Assigned To Renee Maria  Spoke to [facility] at [facility]  They will order for 60 tabs, this will last approximately 2 weeks  After this, he can get 200 mg ER ordered ( he was told this would take 2 weeks)  Prescription was ready on 1/18  I will double check in the front  Guerline Bobo - 20 Jan 2016 12:20 PM     TASK EDITED  S/W pt and advised of the same  Pt's wife will p/u nucynta ER 100mg RX today at [facility] office  Pt instructed that the RX will say to take 1 tab Q12hrs but Dr Shady Pnea wants him to take 2 tabs Q12hrs, and this order for #60 tabs will last him 2 wks  Pt questioning what will happen in 2 wks b/c he'll be taking more than what the Rx was written for and won't the ins question that when he goes to get the next RX filled? Pt said he still has the 2 months of Rx for the nucynta ER 200mg but the 200mg ER remains on back order indefinitely from what he's been told by 2 different pharmacies  I placed pt on hold and discussed w/ Dr Shady Pena further -> Dr Shady Pena advises pt to fill Rx for the nucynta ER 100mg and take 2 tabs Q12hrs, she will contact Nucynta to inquire about the back order of the nucynta ER 200mg and then she will call the pt  Pt to hold onto the 2 months of RXs for nucynta ER 200mg for now per Dr Shady Pena  Pt appreciative of Dr Leila Hoover with all of this     Renee Maria - 20 Jan 2016 12:44 PM     TASK REPLIED TO: Previously Assigned To (NexIUM); TAKE 1   CAPSULE DAILY AS NEEDED FOR GERD; Therapy: 24SKX7214 to (Last Kasie Núñez)  Requested for: 17TZP5026 Ordered   8  Gabapentin 300 MG Oral Capsule; take 3 capsules at bedtime; Therapy: 52OBN4286 to (Last Rx:10Jun2015)  Requested for: 10Jun2015 Ordered   9  Glucosamine TABS; Therapy: (450 45 295) to Recorded   10  Shira CAPS Recorded   11  Minocycline HCl - 100 MG Oral Tablet; Therapy: (450 45 295) to Recorded   12  Nucynta  MG Oral Tablet Extended Release 12 Hour; Take 1 tablet every 12    hours; Therapy: 27OXZ9586 to (Evaluate:23Cnp7909); Last Rx:18Jan2016 Ordered   13  Orphenadrine Citrate  MG Oral Tablet Extended Release 12 Hour; TAKE 1    TABLET Every twelve hours PRN muscle spasm; Therapy: 11QSS0224 to (Evaluate:61Nis8345); Last Rx:43Jjn2364 Ordered   14  Orphenadrine Citrate  MG Oral Tablet Extended Release 12 Hour; TAKE 1    TABLET THREE TIMES A DAY AS NEEDED FOR MUSCLE SPASM; Therapy: 87KKA8017 to (Last Rx:12Jun2015)  Requested for: 12Jun2015 Ordered   15  PredniSONE 10 MG Oral Tablet; TAKE 1 TABLET DAILY; Therapy: (450 45 295) to Recorded   16  Sleep Oral Capsule; Therapy: 02DZG9805 to Recorded   17  TraMADol HCl - 50 MG Oral Tablet; Take 1 to 2 tablets PO up to 3 times a day as    needed for pain; Therapy: 01YCI6433 to (Evaluate:02Jan2016); Last Rx:33Zyz3616 Ordered   18  Vitamin B12 TABS; Therapy: (Recorded:07Nov2012) to Recorded    Allergies    1  Morphine Derivatives   2  Penicillins   3   Sulfa Drugs    Signatures   Electronically signed by : David Burrows, ; Jan 20 2016  1:20PM EST                       (Author)

## 2018-01-09 NOTE — PROGRESS NOTES
History of Present Illness  Care Coordination Encounter Information:   Type of Encounter: Telephonic   Contact: Follow-Up    Spoke to Patient  Care Coordination SL Nurse [employer] On license of [facility]:   The reason for call is to discuss outreach for follow up/needed services  Outreach TC to patient for CM assessment and update of scheduling with rheumatology  Patient reports his RA pain is 8/10 even with using his endocet, gabapentin, and Celebrex  Patient has been off Enbrel x 3 weeks  Patient will be getting his biologic infusion after 10/13  patient's last dose od Enbrel was 9/13  There is a waiting period of 30 days after Enbrel for patient to receive the biologic due to med interactions  Patient states understanding of why biologic can not be given until after 10/13  Patient has an appointment on 10/5 with pain management and this CM suggested patient discuss short term pain relief measures with pain management until biologic can be administered and takes effect  Patient verbalized understanding  Reviewed role of CM and contact information  Active Problems    1  (QFT) QuantiFERON-TB test reaction without active tuberculosis (795 52) (R76 12)   2  Abnormal neurological exam (781 99) (R29 90)   3  Acute hip pain (719 45) (M25 559)   4  Analgesic use (V58 69) (Z79 899)   5  Bruise (924 9) (T14 8XXA)   6  Chronic pain syndrome (338 4) (G89 4)   7  Chronic steroid use (V58 65)   8  DDD (degenerative disc disease), lumbosacral (722 52) (M51 37)   9  Depression screen (V79 0) (Z13 89)   10  Esophageal reflux (530 81) (K21 9)   11  Fall with injury (959 9,E888 9) (W19 XXXA)   12  Generalized osteoarthritis (715 00) (M15 9)   13  Hip pain, right (719 45) (M25 551)   14  Insomnia (780 52) (G47 00)   15  Latent tuberculosis (795 51) (R76 11)   16  Left ankle swelling (719 07) (M25 472)   17  Left leg swelling (729 81) (M79 89)   18  Leg hematoma (924 5) (S80 10XA)   19  Levoscoliosis (737 39) (M41 80)   20   Low back pain (724 2) (M54 5)   21  Lumbar post-laminectomy syndrome (722 83) (M96 1)   22  Lumbar radiculopathy (724 4) (M54 16)   23  Multiple joint pain (719 49) (M25 50)   24  Myalgia (729 1) (M79 1)   25  Neoplasm of skin (239 2) (D49 2)   26  On etanercept therapy (V58 69) (Z79 899)   27  Opioid dependence (304 00) (F11 20)   28  Other muscle spasm (728 85) (M62 838)   29  Reactive airway disease (493 90) (J45 909)   30  Restless legs syndrome (333 94) (G25 81)   31  Rheumatoid arthritis involving multiple sites with positive rheumatoid factor (714 0)    (M05 79)   32  Rib pain on right side (786 50) (R07 81)   33  Shoulder injury, left, initial encounter (959 2) (S49 92XA)   34  Sleep apnea (780 57) (G47 30)   35  Spondylolisthesis, grade 1 (738 4) (M43 10)   36  Therapeutic drug monitoring (V58 83) (Z51 81)   37  Visual disturbances (368 9) (H53 9)    Past Medical History    1  History of Acute right lumbar radiculopathy (724 4) (M54 16)   2  History of Acute upper respiratory infection (465 9) (J06 9)   3  History of Encounter for screening for malignant neoplasm of colon (V76 51) (Z12 11)   4  History of arthritis (V13 4) (Z87 39)   5  History of esophageal reflux (V12 79) (Z87 19)   6  History of rheumatoid arthritis (V13 4) (Z87 39)   7  History of Nonvenomous Insect Bite Of Right Upper Arm (912 4)   8  History of Pain in both lower extremities (729 5) (M79 604,M79 605)   9  History of Pain of left shoulder region (719 41) (M25 512)   10  History of Skin rash (782 1) (R21)   11  History of Visit For: Screening Exam Parasitic Infections (V75 8)    Surgical History    1  History of Elbow Surgery   2  History of Foot Surgery   3  History of Knee Surgery   4  History of Palatopharyngoplasty   5  History of Peripheral Nerve Block Wrist Radial Right   6  History of Sinus Surgery   7  History of Tonsillectomy With Adenoidectomy   8  History of Umbilical Hernia Repair    Family History  Mother    1   Family history of congestive heart failure (V17 49) (Z82 49)  Father    2  Family history of transient ischemic attacks (V17 1) (Z82 3)  Brother    3  Family history of gout (V18 19) (Z82 69)  Family History    4  Family history of cerebrovascular accident (V17 1) (Z82 3)   5  Denied: Family history of Crohn's disease   6  Denied: Family history of psoriasis   7  Denied: Family history of rheumatoid arthritis   8  Denied: Family history of systemic lupus erythematosus   9  Denied: Family history of ulcerative colitis    Social History    · Alcohol use (V49 89) (Z78 9)   · Being A Social Drinker   · Drinks coffee   · Never A Smoker   · No drug use    Current Meds    1  Gabapentin 300 MG Oral Capsule; Two tabs in a m  4 in PM;   Therapy: (Recorded:13Sep2017) to Recorded    2  Endocet 7 5-325 MG Oral Tablet; TAKE 1 TABLET EVERY 6 HOURS AS NEEDED FOR   PAIN;   Therapy: 28Nku0858 to (Evaluate:14Oct2017); Last Rx:88Fde8296 Ordered    3  Esomeprazole Magnesium 40 MG Oral Capsule Delayed Release (NexIUM); TAKE 1   CAPSULE DAILY AS NEEDED FOR GASTROESOPHAGEAL REFLUX DISEASE; Therapy: 23GWZ2634 to (Last Rx:23Fdt7602)  Requested for: 23Wix6761 Ordered    4  Zolpidem Tartrate 10 MG Oral Tablet; TAKE 1 TABLET AT BEDTIME AS NEEDED FOR   SLEEP; Therapy: 50GMA7762 to (96 740455); Last Rx:93Rtd4480 Ordered    5  Orphenadrine Citrate  MG Oral Tablet Extended Release 12 Hour; TAKE 1   TABLET THREE TIMES A DAY AS NEEDED FOR MUSCLE SPASM; Therapy: 38AHP9295 to (Last Rx:72Yxl3379)  Requested for: 54Jnf5468 Ordered    6  PredniSONE 5 MG Oral Tablet; take 2 tablet daily; Therapy: (Recorded:13Sep2017) to Recorded    7  CeleBREX 200 MG Oral Capsule (Celecoxib); Take 1 capsule twice daily; Therapy: (Recorded:10Aug2017) to Recorded    8  Allergy TABS; Therapy: (Recorded:13Sep2017) to Recorded    Allergies    1  Penicillins   2  Morphine Derivatives   3  Sulfa Drugs    End of Encounter Meds    1  Gabapentin 300 MG Oral Capsule;  Two tabs in a m  4 in PM;   Therapy: (Recorded:54Cbo6503) to Recorded    2  Endocet 7 5-325 MG Oral Tablet; TAKE 1 TABLET EVERY 6 HOURS AS NEEDED FOR   PAIN;   Therapy: 59Lzy5969 to (Evaluate:14Oct2017); Last Rx:92Wrb0850 Ordered    3  Esomeprazole Magnesium 40 MG Oral Capsule Delayed Release (NexIUM); TAKE 1   CAPSULE DAILY AS NEEDED FOR GASTROESOPHAGEAL REFLUX DISEASE; Therapy: 08ATG9995 to (Last Rx:26Pwc4402)  Requested for: 92Xcc4963 Ordered    4  Zolpidem Tartrate 10 MG Oral Tablet; TAKE 1 TABLET AT BEDTIME AS NEEDED FOR   SLEEP; Therapy: 79KWM8466 to (21 ); Last Rx:17Zgp8064 Ordered    5  Orphenadrine Citrate  MG Oral Tablet Extended Release 12 Hour; TAKE 1   TABLET THREE TIMES A DAY AS NEEDED FOR MUSCLE SPASM; Therapy: 46PRF9396 to (Last Rx:60Rex5675)  Requested for: 55Qca3977 Ordered    6  PredniSONE 5 MG Oral Tablet; take 2 tablet daily; Therapy: (Recorded:81Xpn6836) to Recorded    7  CeleBREX 200 MG Oral Capsule (Celecoxib); Take 1 capsule twice daily; Therapy: (Recorded:10Aug2017) to Recorded    8  Allergy TABS; Therapy: (Recorded:42Ygv0612) to Recorded    Future Appointments    Date/Time Provider Specialty Site   10/05/2017 03:00 PM MARIEL Cox Pain Management [facility] SPINE   11/24/2017 03:00 PM Josefa Darby [facility]   10/10/2017 10:15 AM [address] [facility] June   Pain Management [place] OUTPATIENT     Patient Care Team    Care Team Member Role Specialty Office Number   Edgard ROBERTS MD  Rheumatology [phone]   Que Renteria DO Specialist Physiatry [phone]   Refgerri Hummel DO Attending Family Medicine [phone]   Jonathan Charles  Nurse Practitioner [phone]   UWPRKEHR Madalyn Barlow MD  Pain Management [phone]     Signatures   Electronically signed by : Marques Mendez RN; Oct  3 2017 11:38AM EST                       (Author)

## 2018-01-10 NOTE — RESULT NOTES
Verified Results  XR ENTIRE SPINE 2-3 VIEW ( scoliosis) 87Yae4371 03:18PM Stephanie Lassiter Order Number: [id number]     Test Name Result Flag Reference   XR ENTIRE SPINE 2-3 VW (scoliosis) (Report)     SCOLIOSIS      INDICATION: Back pain  History of arthritis     COMPARISON: Lumbar spine x-rays on 7/22/2015     VIEWS: Erect PA and lateral views thoracolumbar spine; 8 images     FINDINGS:     There is no fracture, pathologic bone lesion or congenital segmentation anomaly  Bilateral hip prostheses are noted  There is thoracolumbar scoliosis  Rotatory levoscoliosis of lumbar spine present  Measuring from the inferior endplate of G19, to the tibia the inferior endplate of L5, the Portillo angle is 24 degrees  There is extra scoliosis of the thoracic spine  Measuring from the superior endplate of T4 to the inferior endplate of V56, Portillo angle is 16 degrees   Discogenic degenerative changes of the thoracolumbar spine noted       IMPRESSION:     Thoracolumbar scoliosis as detailed above       Workstation performed: [id number]     Signed by:   Juanito Kearney MD   1/3/17

## 2018-01-10 NOTE — MISCELLANEOUS
Message  Spoke with patient in regard to his positive TB test  Patient was made aware that he will need a chest x-ray and to schedule appointment with infectious disease for further treatment of latent TB  Patient did voice understanding and states that he will follow-up at our office this afternoon to  orders for both the x-ray and referral  Patient did voice understanding and thanked me  Plan  Rheumatoid arthritis involving multiple sites with positive rheumatoid factor    · * XR CHEST PA & LATERAL; Status:Active; Requested for:12Mxt3535;    · 2 - Elly SNYDER, Don Cook  (Infectious Disease) Co-Management  *Please evaluate and treat for  positive tb test and latent tb  thanks  Status: Hold For - Scheduling  Requested for:  18GUY6648  Care Summary provided  : Yes    Signatures   Electronically signed by : Karen Jimenez, [facility];  Aug 18 2017 10:02AM EST                       (Author)

## 2018-01-10 NOTE — PROGRESS NOTES
Assessment    1  Abnormal neurological exam (781 99) (R29 90)   2  Levoscoliosis (737 39) (M41 80)    Plan  Abnormal neurological exam    · * MRI LUMBAR SPINE W WO CONTRAST; Status:Need Information - Financial  Authorization; Requested for:90Caw9559; Analgesic use, Myalgia    · (1) VITAMIN D 25-HYDROXY; Status:Active; Requested for:20Hrg6780;   Levoscoliosis    · XR ENTIRE SPINE 2-3 VIEW ( scoliosis); Status:Active; Requested for:98Jjo2516;   Myalgia    · (1) ALDOLASE; Status:Active; Requested for:97Xop6353;    · (1) CK (CPK); Status:Active; Requested for:56Ocr2144;     Discussion/Summary  Impression: low back pain  The diagnostic plan includes lumbar spine MRI and blood tests  There are no changes in medication management  Patient discussion: discussed with the patient, discussed with the patient's family  Chief Complaint  Follow up for post op lumbar spine rehab   12/25 last seen Dec 2015 and D/Cd ti PRN care  Here for back  History of Present Illness  64 yo male with greater than one month right buttock radiating down to knee, new numbness and tingling in L4 distribution  LISBETH 5/12/2015  today 8/20 has had surgery Dr Emily Dent Ashlie 15, 2015, L-2 thru L-5  Has weaned from 10 mg oxycodones to 5 mg, taking 4 a day  Referred for rehab after surgery by Dr Andie Shahid  9/17 follow up   takes opioid four times a day with supplemental APAP  Working part time at [employer]  10/15 state he does HEP per PT  Leg feels stronger  Works three half days  No radicular Sx  some longstanding numbness in toes (multiple surgeries  Analgesics four times a day, no constipation, no drowsiness  12/3 follow for right upper lumbar radiculopathy due to HNP, surgery was successful Ashlie 15,   Pt  and his wife here today with ongoing concern for ongoing multiple sites of pain, wants a pain Medicine consult   sent in past by PCP to pain Medicine but not seen   on gabapentin for "restless legs" at HS only   Is taking one tramadol in am, oxycodone at noon, evening meal tramadol and then oxycodone at HS  Prior to surgery from Dr Petr Horton was taking tramadol 50 mg one tab TID  Now taking left sided sciatic pain for a week, feels tight , uses hot tub for stretching  Never had post op PT   had one visit and told HEP adequate  He thinks his femoral component has shifted in last week     12/25 re-eval feels he is "leaning to right " for about two months   denies pain avoidance  thought was van seat  unsure if discs or muscles  getting some right LBP and lateral hip pain, pain around surgical site from prior lami  Denies any Sx below knees and no N/T or BBI  Still treats with Pain Medicine and Yolanda Lopez of Rheumatology  Feels he is no longer able to lift 25 # dehumidifier   tolerates 15 # max   gets help with work  In August had a fall and trauma eval at [facility] and thoracic and lumbar spine films without fracture  Review of Systems    Musculoskeletal: as noted in HPI  ROS reviewed  Active Problems    1  Abnormal neurological exam (781 99) (R29 90)   2  Acute hip pain (719 45) (M25 559)   3  Acute upper respiratory infection (465 9) (J06 9)   4  Analgesic use (V58 69) (Z79 899)   5  Bruise (924 9) (T14 8)   6  Chronic pain syndrome (338 4) (G89 4)   7  Chronic steroid use (V58 65)   8  DDD (degenerative disc disease), lumbosacral (722 52) (M51 37)   9  Depression screen (V79 0) (Z13 89)   10  Encounter for screening for malignant neoplasm of colon (V76 51) (Z12 11)   11  Esophageal reflux (530 81) (K21 9)   12  Fall with injury (959 9,E888 9) (W19 XXXA)   15  Left ankle swelling (719 07) (M25 472)   14  Left leg swelling (729 81) (M79 89)   15  Leg hematoma (924 5) (S80 10XA)   16  Low back pain (724 2) (M54 5)   17  Multiple joint pain (719 49) (M25 50)   18  Myalgia (729 1) (M79 1)   19  Neoplasm of skin (239 2) (D49 2)   20  Opioid dependence (304 00) (F11 20)   21  Other muscle spasm (469 84) (C90 315)   22   Pain in both lower extremities (729 5) (M79 604,M79 605)   23  Reactive airway disease (493 90) (J45 909)   24  Restless legs syndrome (333 94) (G25 81)   25  Rheumatoid arthritis (714 0) (M06 9)   26  Skin rash (782 1) (R21)   27  Sleep apnea (780 57) (G47 30)   28  Visual disturbances (368 9) (H53 9)    Past Medical History    1  History of Acute right lumbar radiculopathy (724 4) (M54 16)   2  History of arthritis (V13 4) (Z87 39)   3  History of esophageal reflux (V12 79) (Z87 19)   4  History of Nonvenomous Insect Bite Of Right Upper Arm (912 4)   5  History of Visit For: Screening Exam Parasitic Infections (V75 8)    The active problems and past medical history were reviewed and updated today  Surgical History    1  History of Foot Surgery   2  History of Knee Surgery   3  History of Palatopharyngoplasty   4  History of Peripheral Nerve Block Wrist Radial Right   5  History of Sinus Surgery   6  History of Tonsillectomy With Adenoidectomy   7  History of Umbilical Hernia Repair    The surgical history was reviewed and updated today  Family History  Mother    1  Family history of congestive heart failure (V17 49) (Z82 49)  Father    2  Family history of transient ischemic attacks (V17 1) (Z82 3)  Family History    3  Family history of cerebrovascular accident (V17 1) (Z82 3)    The family history was reviewed and updated today  Social History    · Alcohol use (V49 89) (Z78 9)   · Being A Social Drinker   · Drinks coffee   · Never A Smoker   · No drug use  The social history was reviewed and is unchanged  Current Meds   1  B-Complex TABS; Therapy: (Recorded:07Nov2012) to Recorded   2  Celecoxib 200 MG Oral Capsule; TAKE 1 CAPSULE DAILY AS NEEDED; Last   Rx:10Jun2015 Ordered   3  Collagen Hydrolysate Powder; Therapy: (Recorded:67Vuo7908) to Recorded   4  Enbrel 50 MG/ML SOLN; INJECT 50MG SUBCUTANEOUSLY WEEKLY Recorded   5  Endocet 5-325 MG Oral Tablet; TAKE 1 TABLET EVERY 5- 6 HOURS;    Therapy: 90Fep1876 to (Evaluate:02Fjy5171); Last Rx:67Xsj2931 Ordered   6  Esomeprazole Magnesium 40 MG Oral Capsule Delayed Release; TAKE 1 CAPSULE   DAILY AS NEEDED FOR GASTROESOPHAGEAL REFLUX DISEASE; Therapy: 26CNA7263 to (Last TN:80PMP5306)  Requested for: 46TTX2571 Ordered   7  Gabapentin 600 MG Oral Tablet; Therapy: (Recorded:61Fii0839) to Recorded   8  Glucosamine TABS; Therapy: (044 803 99 21) to Recorded   9  Orphenadrine Citrate  MG Oral Tablet Extended Release 12 Hour; TAKE 1   TABLET THREE TIMES A DAY AS NEEDED FOR MUSCLE SPASM; Therapy: 77KOY6188 to (Evaluate:68Qvg7434)  Requested for: 81UGP0552; Last   Rx:68Vpu7762 Ordered   10  Sleep Oral Capsule; Therapy: 63SYL5287 to Recorded   11  Vitamin B12 TABS; Therapy: (600 803 99 21) to Recorded    The medication list was reviewed and updated today  Allergies    1  Morphine Derivatives   2  Penicillins   3  Sulfa Drugs    Vitals  Signs   Recorded: 76Rre2361 02:10PM   Heart Rate: 84  Systolic: 394  Diastolic: 80  Height: 5 ft 10 in  Weight: 179 lb   BMI Calculated: 25 68  BSA Calculated: 1 99    Physical Exam        Lumbar/Sacral Spine examination demonstrates Lumbosacral Spine:   Marked TTP bilateral lumbar PVM  Evaluation of Muscle Stretch Reflexes on the right side demonstrates 1/4 Hamstring Reflex, 1/4 Knee Jerk Reflex and 1/4 Ankle Jerk Reflex, but negative right ankle clonus  Evaluation of Muscle Stretch Reflexes on the left side demonstrates 1/4 Hamstring Reflex, 1/4 Knee Jerk Reflex, 1/4 Ankle Jerk Reflex and negative left ankle clonus  Special Tests: positive Jose Daniel test on right and positive Jose Daniel test on left, but negative Straight Leg Raise on right and negative Straight Leg Raise on left  Results/Data  I personally reviewed the films/images/results in the office today  My interpretation follows  X-ray Review no images  Monia Le Monia Le Monia Le T spine and L- spine no fractures  levoscoliosis        Signatures   Electronically signed by : Melchor Putnam DO; Dec 29 2016  2:51PM EST                       (Author)

## 2018-01-10 NOTE — RESULT NOTES
Message   Recorded as Task   Date: 06/21/2016 12:36 PM, Created By: Tracie Chen   Task Name: Med Renewal Request   Assigned To: [facility]   Regarding Patient: Randy Rosario, Status: In Progress   Comment:    Fransisca Richards - 21 Jun 2016 12:36 PM     TASK CREATED  Caller: Self; Renew Medication; [phone] (Home); [phone] (Work)  Received a TC from the pt  today requesting a refill on his gabapentin 100mg, 2 capsules TID  Last script was on 5/5  His next povs is on 7/16  He [facility] to advise  thanks   Yolis Pace - 21 Jun 2016 4:51 PM     TASK REPLIED TO: Previously Assigned To [facility],Team  This had been ordered by PCP previously  was [facility] to send it  Please add it and I can send it   Yolis Watkinsu - 21 Jun 2016 4:53 PM     TASK REASSIGNED: Previously Assigned To [facility],Team  Please disregard previous response  Gabapentin sent to [facility]    Fransisca Richards - 22 Jun 2016 8:18 AM     TASK EDITED  Attempted to call pt  and left a detailed mom in regards to refill     Fransisca Richards - 22 Jun 2016 8:18 AM     TASK IN PROGRESS        Signatures   Electronically signed by : Lesley Purvis, ; Jun 22 2016  8:38AM EST                       (Author)

## 2018-01-11 NOTE — PROGRESS NOTES
History of Present Illness  Care Coordination Encounter Information:   Type of Encounter: Telephonic   Contact: Follow-Up    Spoke to Patient  Care Coordination SL Nurse Maria Antonia Salamanca:   The reason for call is to discuss outreach for follow up/needed services  Outreach TC to patient for CM assessment  Conversation was very long & convoluted  Patient had multiple complaints regarding [address]  Patient did have his infusion of Actemra at [facility]  he then went to see Dr Rocky Lo in [place] for a 2nd opinion  Patient has decided to continue w/ this physician  Patient had an epidural at [facility] for treatment of his low back pain  Patient states that he is taking his Endocet, Celebrex, gabapentin & prednisone as instructed but still has pain 8/10  Patient states he needs additional acetaminophen just to function  Patient educated on max dose of acetaminophen w/ understanding verbalized  Patient is to take a sub q injection of Actemra on 10/30  Patient has also had an evaluation by Dr Ranulfo Rushing for pain management  Patient is agreeable to continued CM calls  Patient is not sure at this time if he will be continuing w/ [facility]  Active Problems    1  (QFT) QuantiFERON-TB test reaction without active tuberculosis (795 52) (R76 12)   2  Abnormal neurological exam (781 99) (R29 90)   3  Acute hip pain (719 45) (M25 559)   4  Analgesic use (V58 69) (Z79 899)   5  Bruise (924 9) (T14 8XXA)   6  Chronic pain syndrome (338 4) (G89 4)   7  Chronic steroid use (V58 65)   8  DDD (degenerative disc disease), lumbosacral (722 52) (M51 37)   9  Depression screen (V79 0) (Z13 89)   10  Esophageal reflux (530 81) (K21 9)   11  Fall with injury (959 9,E888 9) (W19 XXXA)   12  Generalized osteoarthritis (715 00) (M15 9)   13  Hip pain, right (719 45) (M25 551)   14  Insomnia (780 52) (G47 00)   15  Latent tuberculosis (795 51) (R76 11)   16   Left ankle swelling (719 07) (M25 472)   17  Left leg swelling (729 81) (M79 89)   18  Leg hematoma (924 5) (S80 10XA)   19  Levoscoliosis (737 39) (M41 80)   20  Low back pain (724 2) (M54 5)   21  Lumbar post-laminectomy syndrome (722 83) (M96 1)   22  Lumbar radiculopathy (724 4) (M54 16)   23  Multiple joint pain (719 49) (M25 50)   24  Myalgia (729 1) (M79 1)   25  Neoplasm of skin (239 2) (D49 2)   26  On etanercept therapy (V58 69) (Z79 899)   27  Opioid dependence (304 00) (F11 20)   28  Other muscle spasm (728 85) (M62 838)   29  Reactive airway disease (493 90) (J45 909)   30  Restless legs syndrome (333 94) (G25 81)   31  Rheumatoid arthritis involving multiple sites with positive rheumatoid factor (714 0)    (M05 79)   32  Rib pain on right side (786 50) (R07 81)   33  Shoulder injury, left, initial encounter (959 2) (S49 92XA)   34  Sleep apnea (780 57) (G47 30)   35  Spondylolisthesis, grade 1 (738 4) (M43 10)   36  Therapeutic drug monitoring (V58 83) (Z51 81)   37  Visual disturbances (368 9) (H53 9)    Past Medical History    1  History of Acute right lumbar radiculopathy (724 4) (M54 16)   2  History of Acute upper respiratory infection (465 9) (J06 9)   3  History of Encounter for screening for malignant neoplasm of colon (V76 51) (Z12 11)   4  History of arthritis (V13 4) (Z87 39)   5  History of esophageal reflux (V12 79) (Z87 19)   6  History of rheumatoid arthritis (V13 4) (Z87 39)   7  History of Nonvenomous Insect Bite Of Right Upper Arm (912 4)   8  History of Pain in both lower extremities (729 5) (M79 604,M79 605)   9  History of Pain of left shoulder region (719 41) (M25 512)   10  History of Skin rash (782 1) (R21)   11  History of Visit For: Screening Exam Parasitic Infections (V75 8)    Surgical History    1  History of Elbow Surgery   2  History of Foot Surgery   3  History of Knee Surgery   4  History of Palatopharyngoplasty   5  History of Peripheral Nerve Block Wrist Radial Right   6  History of Sinus Surgery   7  History of Tonsillectomy With Adenoidectomy   8  History of Umbilical Hernia Repair    Family History  Mother    1  Family history of congestive heart failure (V17 49) (Z82 49)  Father    2  Family history of transient ischemic attacks (V17 1) (Z82 3)  Brother    3  Family history of gout (V18 19) (Z82 69)  Family History    4  Family history of cerebrovascular accident (V17 1) (Z82 3)   5  Denied: Family history of Crohn's disease   6  Denied: Family history of psoriasis   7  Denied: Family history of rheumatoid arthritis   8  Denied: Family history of systemic lupus erythematosus   9  Denied: Family history of ulcerative colitis    Social History    · Alcohol use (V49 89) (Z78 9)   · Being A Social Drinker   · Drinks coffee   · Never A Smoker   · No drug use    Current Meds    1  Gabapentin 300 MG Oral Capsule; Two tabs in a m  4 in PM;   Therapy: (Recorded:45Kqa6078) to Recorded    2  Endocet 7 5-325 MG Oral Tablet; TAKE 1 TABLET EVERY 6 HOURS AS NEEDED FOR   PAIN;   Therapy: 07Fko1182 to (Evaluate:17Nce8178); Last Rx:05Oct2017 Ordered    3  Esomeprazole Magnesium 40 MG Oral Capsule Delayed Release (NexIUM); TAKE 1   CAPSULE DAILY AS NEEDED FOR GASTROESOPHAGEAL REFLUX DISEASE; Therapy: 99FBI2692 to (Last Rx:69Qra1116)  Requested for: 48Icj3730 Ordered    4  Zolpidem Tartrate 10 MG Oral Tablet; TAKE 1 TABLET AT BEDTIME AS NEEDED FOR   SLEEP; Therapy: 78RIN7205 to [phone]); Last Rx:52Uli2239 Ordered    5  Orphenadrine Citrate  MG Oral Tablet Extended Release 12 Hour; TAKE 1   TABLET THREE TIMES A DAY AS NEEDED FOR MUSCLE SPASM; Therapy: 59QDH9537 to (Last Rx:69Oni0332)  Requested for: 12Afa6379 Ordered    6  PredniSONE 5 MG Oral Tablet; take 2 tablet daily; Therapy: (Recorded:13Sep2017) to Recorded    7  CeleBREX 200 MG Oral Capsule (Celecoxib); Take 1 capsule twice daily; Therapy: (Recorded:10Aug2017) to Recorded    8  Allergy TABS;    Therapy: (Recorded:20Mlp5823) to Recorded    Allergies    1  Penicillins   2  Morphine Derivatives   3  Sulfa Drugs    End of Encounter Meds    1  Gabapentin 300 MG Oral Capsule; Two tabs in a m  4 in PM;   Therapy: (Recorded:99Wgd1659) to Recorded    2  Endocet 7 5-325 MG Oral Tablet; TAKE 1 TABLET EVERY 6 HOURS AS NEEDED FOR   PAIN;   Therapy: 49Eme4952 to (Evaluate:79Fch4569); Last Rx:05Oct2017 Ordered    3  Esomeprazole Magnesium 40 MG Oral Capsule Delayed Release (NexIUM); TAKE 1   CAPSULE DAILY AS NEEDED FOR GASTROESOPHAGEAL REFLUX DISEASE; Therapy: 84YGG5232 to (Last Rx:34Tjo3428)  Requested for: 21Aug2017 Ordered    4  Zolpidem Tartrate 10 MG Oral Tablet; TAKE 1 TABLET AT BEDTIME AS NEEDED FOR   SLEEP; Therapy: 69MOW5570 to ([phone]); Last Rx:20Ssz6981 Ordered    5  Orphenadrine Citrate  MG Oral Tablet Extended Release 12 Hour; TAKE 1   TABLET THREE TIMES A DAY AS NEEDED FOR MUSCLE SPASM; Therapy: 99EIE9547 to (Last Rx:05Sep2017)  Requested for: 05Sep2017 Ordered    6  PredniSONE 5 MG Oral Tablet; take 2 tablet daily; Therapy: (Recorded:82Jnm4469) to Recorded    7  CeleBREX 200 MG Oral Capsule (Celecoxib); Take 1 capsule twice daily; Therapy: (Recorded:10Aug2017) to Recorded    8  Allergy TABS;    Therapy: (Recorded:40Bwi5821) to Recorded    Future Appointments    Date/Time Provider Specialty Site   11/24/2017 03:00 PM Katie Ragsdale [facility] Rheumatology [address]     Patient Care Team    Care Team Member Role Specialty Office Number   Burnice Sessions D MD  Rheumatology [phone]   [facility] DO Specialist Physiatry [phone]   Anjali Funez DO Attending Family Medicine [phone]   Pelon Christina  Nurse Practitioner [phone]   [address] MD  Pain Management [phone]   Collette FLOYD  Pain Management [phone]     Signatures   Electronically signed by : Eamon Vela RN; Oct 26 2017 11:03AM EST (Author)

## 2018-01-11 NOTE — RESULT NOTES
Message   venous duplex of b/l legs wnl  Verified Results  VAS LOWER LIMB VENOUS DUPLEX STUDY, COMPLETE BILATERAL 55Uwi9475 04:34PM Bismark Najera Order Number: [id number]    - Patient Instructions: To schedule this appointment, please contact Central Scheduling at [phone]   Order Number: [id number]    - Patient Instructions: To schedule this appointment, please contact Central Scheduling at [phone]  Test Name Result Flag Reference   VAS LOWER LIMB VENOUS DUPLEX STUDY, COMPLETE BILATERAL (Report)     [facility] REPORT   CLINICAL:   Indications: Bilateral limb pain  Patient injured himself while working in the   kitchen, fixing the floor, slipped and fell on the wooden beam and bruised both   legs  FINDINGS:      Segment Right      Left          Impression    Impression       CFV   Normal (Patent) Normal (Patent)             CONCLUSION:   Impression:   RIGHT LOWER LIMB:   No evidence of acute or chronic deep vein thrombosis  No evidence of superficial thrombophlebitis noted  Doppler evaluation shows a normal response to augmentation maneuvers  Popliteal, posterior tibial and anterior tibial arterial Doppler waveforms are   triphasic  LEFT LOWER LIMB:   No evidence of acute or chronic deep vein thrombosis  No evidence of superficial thrombophlebitis noted  Doppler evaluation shows a normal response to augmentation maneuvers  Popliteal, posterior tibial and anterior tibial arterial Doppler waveforms are   triphasic        SIGNATURE:   Electronically Signed by: Teofilo Yañez on 2016-09-03 06:38:22 AM

## 2018-01-11 NOTE — MISCELLANEOUS
Message   Recorded as Task   Date: 01/26/2017 03:44 PM, Created By: Quan Oleary   Task Name: Care Coordination   Assigned To: [facility]   Regarding Patient: Sindy Mackey, Status: Active   Comment:    Efrain Mueller - 26 Jan 2017 3:44 PM     TASK CREATED  [facility]; Care Coordination; [phone]  [facility] 1/26/2017 @ 1230  [facility] re: gabapentin 300 mg, requesting 90 day supply instead of 30 day per Rosemarie Amador Pls cb, reference #[id number]   Fransisca Richards - 26 Jan 2017 4:25 PM     TASK EDITED   Krista Schumacher - 26 Jan 2017 4:36 PM     TASK REPLIED TO: Previously Assigned To Krista Schumacher  changed script to gabapentin 600 mg so he is to take 1 tab TID  [facility] today   Saumya Harrison - 27 Jan 2017 4:01 PM     TASK EDITED    I [facility] with [facility]  States pt received Gabapentin 300mg 1 cap in Am, 1 in Pm and 4 at [facility] on 1/5/17, states since the script was changed they will be sending script out on 2/1/17  I spoke with pt, advised of the change to Gabapentin 600mg  1 Cap TID  Pt states that dose will not work for him, he has restless leg syndrome and he take 1200 mg at night to help him sleep  States this is helping so he does not need to increase his narcotic medication  I advised that when he gets the new script he can continue to take Gabapentin 300 mg 1 cap in AM, 1 cap in PM and Gabapentin 600 mg 2 caps at HS  This will keep him at the same dose he was at  Advised I will discuss with Cary Tubbs and CB if there are any other recommendations  Pt verbalizes understanding  ***********   Tabby Hearn - 31 Jan 2017 1:07 PM     TASK REPLIED TO: Previously Assigned To Lonna Phoenix aware and callie Siddiqui Spring - 31 Jan 2017 1:51 PM     TASK REASSIGNED: Previously Assigned To [facility],Team        Active Problems    1  Abnormal neurological exam (781 99) (R29 90)   2   Acute hip pain (839 45) (X99 715) 3  Acute upper respiratory infection (465 9) (J06 9)   4  Analgesic use (V58 69) (Z79 899)   5  Bruise (924 9) (T14 8)   6  Chronic pain syndrome (338 4) (G89 4)   7  Chronic steroid use (V58 65)   8  DDD (degenerative disc disease), lumbosacral (722 52) (M51 37)   9  Depression screen (V79 0) (Z13 89)   10  Encounter for screening for malignant neoplasm of colon (V76 51) (Z12 11)   11  Esophageal reflux (530 81) (K21 9)   12  Fall with injury (959 9,E888 9) (W19 XXXA)   15  Left ankle swelling (719 07) (M25 472)   14  Left leg swelling (729 81) (M79 89)   15  Leg hematoma (924 5) (S80 10XA)   16  Levoscoliosis (737 39) (M41 80)   17  Low back pain (724 2) (M54 5)   18  Lumbar post-laminectomy syndrome (722 83) (M96 1)   19  Multiple joint pain (719 49) (M25 50)   20  Myalgia (729 1) (M79 1)   21  Neoplasm of skin (239 2) (D49 2)   22  Opioid dependence (304 00) (F11 20)   23  Other muscle spasm (728 85) (M62 838)   24  Pain in both lower extremities (729 5) (M79 604,M79 605)   25  Reactive airway disease (493 90) (J45 909)   26  Restless legs syndrome (333 94) (G25 81)   27  Rheumatoid arthritis (714 0) (M06 9)   28  Skin rash (782 1) (R21)   29  Sleep apnea (780 57) (G47 30)   30  Spondylolisthesis, grade 1 (738 4) (M43 10)   31  Visual disturbances (368 9) (H53 9)    Current Meds   1  B-Complex TABS; Therapy: (Recorded:07Nov2012) to Recorded   2  Celecoxib 200 MG Oral Capsule (CeleBREX); TAKE 1 CAPSULE DAILY AS NEEDED;   Last Rx:10Jun2015 Ordered   3  Collagen Hydrolysate Powder; Therapy: (Recorded:47Quy3797) to Recorded   4  Enbrel 50 MG/ML SOLN; INJECT 50MG SUBCUTANEOUSLY WEEKLY Recorded   5  Endocet 7 5-325 MG Oral Tablet; TAKE 1 TABLET EVERY 6 HOURS AS NEEDED FOR   PAIN;   Therapy: 11Vlv0979 to (Evaluate:24Mar2017); Last Rx:24Jan2017 Ordered   6  Esomeprazole Magnesium 40 MG Oral Capsule Delayed Release (NexIUM); TAKE 1   CAPSULE DAILY AS NEEDED FOR GASTROESOPHAGEAL REFLUX DISEASE;    Therapy: 40EFN3846 to (Last DI:28UUH5717)  Requested for: 00TOU4367 Ordered   7  Gabapentin 300 MG Oral Capsule; TAKE 2 CAPSULES 3 TIMES DAILY; Therapy: 91SAL0249 to (Evaluate:25Mar2017)  Requested for: 33OJS0599; Last   Rx:24Jan2017 Ordered   8  Gabapentin 600 MG Oral Tablet; TAKE 1 TABLET 3 times daily; Therapy: 79NHT8485 to (Og Gama)  Requested for: 26Jan2017; Last   Rx:26Jan2017 Ordered   9  Gabapentin 600 MG Oral Tablet; Therapy: (Recorded:24Jan2017) to Recorded   10  Glucosamine TABS; Therapy: (044 803 99 21) to Recorded   11  Orphenadrine Citrate  MG Oral Tablet Extended Release 12 Hour; TAKE 1    TABLET THREE TIMES A DAY AS NEEDED FOR MUSCLE SPASM; Therapy: 77VZH4967 to (Evaluate:27Tsl1694)  Requested for: 31EOI4782; Last    Rx:38Dcn2958 Ordered   12  Sleep Oral Capsule; Therapy: 16HCI8595 to Recorded   13  Vitamin B12 TABS; Therapy: (Recorded:07Nov2012) to Recorded    Allergies    1  Morphine Derivatives   2  Penicillins   3   Sulfa Drugs    Signatures   Electronically signed by : Bran Laird RN; Jan 31 2017  1:56PM EST                       (Author)

## 2018-01-12 VITALS
SYSTOLIC BLOOD PRESSURE: 142 MMHG | HEART RATE: 87 BPM | DIASTOLIC BLOOD PRESSURE: 93 MMHG | TEMPERATURE: 98.5 F | BODY MASS INDEX: 23.39 KG/M2 | HEIGHT: 70 IN | WEIGHT: 163.38 LBS

## 2018-01-12 VITALS
HEIGHT: 70 IN | BODY MASS INDEX: 24.34 KG/M2 | WEIGHT: 170 LBS | DIASTOLIC BLOOD PRESSURE: 90 MMHG | SYSTOLIC BLOOD PRESSURE: 162 MMHG

## 2018-01-12 VITALS
HEIGHT: 70 IN | HEART RATE: 86 BPM | TEMPERATURE: 99.3 F | WEIGHT: 176.38 LBS | DIASTOLIC BLOOD PRESSURE: 89 MMHG | BODY MASS INDEX: 25.25 KG/M2 | SYSTOLIC BLOOD PRESSURE: 136 MMHG

## 2018-01-12 VITALS
BODY MASS INDEX: 24.77 KG/M2 | HEART RATE: 79 BPM | SYSTOLIC BLOOD PRESSURE: 157 MMHG | DIASTOLIC BLOOD PRESSURE: 88 MMHG | WEIGHT: 173 LBS | TEMPERATURE: 99.4 F | HEIGHT: 70 IN

## 2018-01-12 VITALS
HEART RATE: 84 BPM | SYSTOLIC BLOOD PRESSURE: 118 MMHG | HEIGHT: 70 IN | WEIGHT: 172 LBS | BODY MASS INDEX: 24.62 KG/M2 | DIASTOLIC BLOOD PRESSURE: 80 MMHG

## 2018-01-12 NOTE — RESULT NOTES
Verified Results  * XR RIBS RIGHT W PA CHEST MIN 3 VIEWS 75UYM5263 03:15PM Praful Barone    Order Number: [id number]     Test Name Result Flag Reference   XR RIBS RIGHT W PA CHEST MIN 3 VIEWS (Report)     RIGHT RIBS AND CHEST - DUAL ENERGY     INDICATION: Pleurodynia  COMPARISON: Chest x-ray dated Ashlie 3, 2015  VIEWS: PA chest (including soft tissue/bone algorithms) and 3 views right hemithorax     IMAGES: 7     FINDINGS:     The cardiomediastinal silhouette is unremarkable  Lungs are clear  No pleural effusions  There is no pneumothorax  There is a nondisplaced fracture of the anterior right 7th rib  IMPRESSION:     1  No active pulmonary disease  2  Nondisplaced anterior right 7th rib fracture         Workstation performed: [id number]     Signed by:   Tata Washington MD   6/13/17

## 2018-01-12 NOTE — RESULT NOTES
Verified Results  (1) ALDOLASE 15Nvz2962 03:20PM Caroline Daniel Order Number: [id number]     Test Name Result Flag Reference   ALDOLASE 9 6 U/L  3 3 - 10 3   Performed at:  [address] [place] [address]  [id number]  : Milton Coyne MD, Phone:  [phone]

## 2018-01-13 VITALS
BODY MASS INDEX: 24.25 KG/M2 | WEIGHT: 169.38 LBS | HEART RATE: 84 BPM | DIASTOLIC BLOOD PRESSURE: 80 MMHG | SYSTOLIC BLOOD PRESSURE: 140 MMHG | HEIGHT: 70 IN

## 2018-01-13 VITALS
HEART RATE: 86 BPM | SYSTOLIC BLOOD PRESSURE: 153 MMHG | BODY MASS INDEX: 23.96 KG/M2 | DIASTOLIC BLOOD PRESSURE: 94 MMHG | WEIGHT: 167.38 LBS | HEIGHT: 70 IN

## 2018-01-13 VITALS
WEIGHT: 169.38 LBS | DIASTOLIC BLOOD PRESSURE: 91 MMHG | SYSTOLIC BLOOD PRESSURE: 126 MMHG | HEART RATE: 101 BPM | BODY MASS INDEX: 24.3 KG/M2
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
## 2018-01-13 NOTE — RESULT NOTES
Message   Recorded as Task   Date: 10/19/2017 01:36 PM, Created By: Lily Kearney   Task Name: Follow Up   Assigned To: [facility]   Regarding Patient: Jarett Reza, Status: Active   Comment:    Anahy Costa - 19 Oct 2017 1:36 PM     TASK CREATED  L/m to return call with %  of relief  S/p CAUDAL LISBETH #3- done 10/10/17 by Anahy Denton - 19 Oct 2017 1:36 PM     TASK EDITED   Lily Kearney - 20 Oct 2017 12:49 PM     TASK EDITED  Patient reports he received  50%relief from his procedure  Patient aware of the 2wk wait  F/u ccall next wk    S/p CAUDAL LISBETH done 10/10/17  [address] [place] [address] - 23 Oct 2017 4:21 PM     TASK REPLIED TO: Previously Assigned To Vahe Mcghee - 26 Oct 6744 29:31 AM     TASK EDITED  Patient reports that he is feeling a little better  Patient has f/u 11/29/17   Sabrina Mao - 26 Oct 2017 11:37 AM     TASK REPLIED TO: Previously Assigned To [place]  Thanks          Signatures   Electronically signed by : Gumaro Gutierrez, ; Oct 26 2017 11:41AM EST                       (Author)

## 2018-01-13 NOTE — RESULT NOTES
Message   Recorded as Task   Date: 04/20/2017 02:41 PM, Created By: Federico Camargo   Task Name: Follow Up   Assigned To: [facility]   Regarding Patient: Emily Hoff, Status: Active   Comment:    Anahy Costa - 20 Apr 2017 2:41 PM     TASK CREATED  L/m to return call  S/p REPEAT CAUDAL LISBETH- done 4/14/17 by Anahy Yeh - 20 Apr 2017 2:59 PM     TASK EDITED   Luis Thomas - 25 Apr 2017 2:17 PM     TASK EDITED  Pt reports 50-60% relief post inj  with an increase in ability to perform ADL's   but now has an awareness of lower back pain  F/U is on 5/30  Sabrina Mao - 25 Apr 2017 4:16 PM     TASK REPLIED TO: Previously Assigned To [place]  Thanks          Signatures   Electronically signed by : Brandy Ontiveros, ; Apr 26 2017  9:01AM EST                       (Author)

## 2018-01-13 NOTE — PROGRESS NOTES
Assessment    1  Lumbar post-laminectomy syndrome (722 83) (M96 1)   2  Levoscoliosis (737 39) (M41 80)   3  Spondylolisthesis, grade 1 (738 4) (M43 10)   4  Hip pain, right (719 45) (M25 551)    Plan  Spondylolisthesis, grade 1    · 1 - Dawson Chowdary MD, Petr Heard  (Orthopedic Surgery) Physician Referral  Consult Only: the  expectation is that the referring provider will communicate back to the patient on  treatment options  Evaluation and Treatment: the expectation is that the referred to  provider will communicate back to the patient on treatment options  Status: Active   Requested for: 86FBP2668  Care Summary provided  : Yes   · 1 - Rosalia SNYDER, Glenn Jackson  (Orthopedic Surgery) Physician Referral  Consult Only: the  expectation is that the referring provider will communicate back to the patient on  treatment options  Evaluation and Treatment: the expectation is that the referred to  provider will communicate back to the patient on treatment options  Status: Active   Requested for: 31EKU2365  Care Summary provided  : Yes   · Follow-up Visit in 4 Weeks Evaluation and Treatment  Follow-up  Status: Hold For -  Scheduling  Requested for: 69HMJ0655    Discussion/Summary  Impression: low back pain, disc herniation, degenerative disc disease of the lumbar spine, osteoarthritis of the lumbar spine, spondylolisthesis and scoliosis  There are no changes in medication management  Per Pain Medicine Patient discussion: discussed with the patient, discussed with the patient's family, Will have Orthopedic spine surgeon re--evaluate you  Chief Complaint  Follow up for post op lumbar spine rehab   12/25 last seen Dec 2015 and D/Cd to PRN care  Here for back  1/20 /17 follow up after MRI  3/10 follow up after Pain Medicine interventions  History of Present Illness  62 yo male with greater than one month right buttock radiating down to knee, new numbness and tingling in L4 distribution  LISBETH 5/12/2015    today 8/20 has had surgery Dr Rosie Johnson Ashlie 15, 2015, L-2 thru L-5  Has weaned from 10 mg oxycodones to 5 mg, taking 4 a day  Referred for rehab after surgery by Dr Dario Givens  9/17 follow up   takes opioid four times a day with supplemental APAP  Working part time at [employer]  10/15 state he does HEP per PT  Leg feels stronger  Works three half days  No radicular Sx  some longstanding numbness in toes (multiple surgeries  Analgesics four times a day, no constipation, no drowsiness  12/3 follow for right upper lumbar radiculopathy due to HNP, surgery was successful Ashlie 15,   Pt  and his wife here today with ongoing concern for ongoing multiple sites of pain, wants a pain Medicine consult   sent in past by PCP to pain Medicine but not seen   on gabapentin for "restless legs" at HS only  Is taking one tramadol in am, oxycodone at noon, evening meal tramadol and then oxycodone at HS  Prior to surgery from Dr Viky Bravo was taking tramadol 50 mg one tab TID  Now taking left sided sciatic pain for a week, feels tight , uses hot tub for stretching  Never had post op PT   had one visit and told HEP adequate  He thinks his femoral component has shifted in last week     12/25 re-eval feels he is "leaning to right " for about two months   denies pain avoidance  thought was van seat  unsure if discs or muscles  getting some right LBP and lateral hip pain, pain around surgical site from prior lami  Denies any Sx below knees and no N/T or BBI  Still treats with Pain Medicine and Suzette Crandall of Rheumatology  Feels he is no longer able to lift 25 # dehumidifier   tolerates 15 # max   gets help with work  In August had a fall and trauma eval at [facility] and thoracic and lumbar spine films without fracture  1/20/17 follow up after MRI   does not have follow-up with Pain Medicine and is needing more analgesics, had been on chronic tramadol from Rheumatology prior to pain medicine  Is awaiting a new motorized scooter thru [facility]     2/10 seem by pain medicine and had LESI   caudal definite benefit but reports "quadruple pain" had to get a lift for his scooter  Also has long term questions regarding his hip feels noises with hip movement  He is complaining of  right leg pain upon awakening  Awaiting [facility] scooter  Active Problems    1  Abnormal neurological exam (781 99) (R29 90)   2  Acute hip pain (719 45) (M25 559)   3  Acute upper respiratory infection (465 9) (J06 9)   4  Analgesic use (V58 69) (Z79 899)   5  Bruise (924 9) (T14 8)   6  Chronic pain syndrome (338 4) (G89 4)   7  Chronic steroid use (V58 65)   8  DDD (degenerative disc disease), lumbosacral (722 52) (M51 37)   9  Depression screen (V79 0) (Z13 89)   10  Encounter for screening for malignant neoplasm of colon (V76 51) (Z12 11)   11  Esophageal reflux (530 81) (K21 9)   12  Fall with injury (959 9,E888 9) (W19 XXXA)   15  Left ankle swelling (719 07) (M25 472)   14  Left leg swelling (729 81) (M79 89)   15  Leg hematoma (924 5) (S80 10XA)   16  Levoscoliosis (737 39) (M41 80)   17  Low back pain (724 2) (M54 5)   18  Lumbar post-laminectomy syndrome (722 83) (M96 1)   19  Multiple joint pain (719 49) (M25 50)   20  Myalgia (729 1) (M79 1)   21  Neoplasm of skin (239 2) (D49 2)   22  Opioid dependence (304 00) (F11 20)   23  Other muscle spasm (728 85) (M62 838)   24  Pain in both lower extremities (729 5) (M79 604,M79 605)   25  Reactive airway disease (493 90) (J45 909)   26  Restless legs syndrome (333 94) (G25 81)   27  Rheumatoid arthritis (714 0) (M06 9)   28  Skin rash (782 1) (R21)   29  Sleep apnea (780 57) (G47 30)   30  Spondylolisthesis, grade 1 (738 4) (M43 10)   31  Visual disturbances (368 9) (H53 9)    Past Medical History    1  History of Acute right lumbar radiculopathy (724 4) (M54 16)   2  History of arthritis (V13 4) (Z87 39)   3  History of esophageal reflux (V12 79) (Z87 19)   4  History of Nonvenomous Insect Bite Of Right Upper Arm (912 4)   5   History of Visit For: Screening Exam Parasitic Infections (V75 8)    The active problems and past medical history were reviewed and updated today  Surgical History    1  History of Foot Surgery   2  History of Knee Surgery   3  History of Palatopharyngoplasty   4  History of Peripheral Nerve Block Wrist Radial Right   5  History of Sinus Surgery   6  History of Tonsillectomy With Adenoidectomy   7  History of Umbilical Hernia Repair    The surgical history was reviewed and updated today  Family History  Mother    1  Family history of congestive heart failure (V17 49) (Z82 49)  Father    2  Family history of transient ischemic attacks (V17 1) (Z82 3)  Family History    3  Family history of cerebrovascular accident (V17 1) (Z82 3)    The family history was reviewed and updated today  Social History    · Alcohol use (V49 89) (Z78 9)   · Being A Social Drinker   · Drinks coffee   · Never A Smoker   · No drug use  The social history was reviewed and is unchanged  Current Meds   1  B-Complex TABS; Therapy: (Recorded:07Nov2012) to Recorded   2  Celecoxib 200 MG Oral Capsule (CeleBREX); TAKE 1 CAPSULE DAILY AS NEEDED;   Last Rx:10Jun2015 Ordered   3  Collagen Hydrolysate Powder; Therapy: (Recorded:09Feb2016) to Recorded   4  Enbrel 50 MG/ML SOLN; INJECT 50MG SUBCUTANEOUSLY WEEKLY Recorded   5  Endocet 7 5-325 MG Oral Tablet; TAKE 1 TABLET EVERY 6 HOURS AS NEEDED FOR   PAIN;   Therapy: 00Rpi0639 to (Evaluate:24Mar2017); Last Rx:24Jan2017 Ordered   6  Esomeprazole Magnesium 40 MG Oral Capsule Delayed Release (NexIUM); TAKE 1   CAPSULE DAILY AS NEEDED FOR GASTROESOPHAGEAL REFLUX DISEASE; Therapy: 59XTS7488 to (Last Rx:22Feb2017)  Requested for: 74Guz4255 Ordered   7  Gabapentin 300 MG Oral Capsule; TAKE 2 CAPSULES 3 TIMES DAILY; Therapy: 35LVF0339 to (Evaluate:25Mar2017)  Requested for: 41MLZ1681; Last   Rx:24Jan2017 Ordered   8  Gabapentin 600 MG Oral Tablet; TAKE 1 TABLET 3 times daily;    Therapy: 15GOB0383 to (Quentin Lewis)  Requested for: 26Jan2017; Last   Rx:26Jan2017 Ordered   9  Gabapentin 600 MG Oral Tablet; Therapy: (Recorded:24Jan2017) to Recorded   10  Glucosamine TABS; Therapy: (Peggyann Means) to Recorded   11  Orphenadrine Citrate  MG Oral Tablet Extended Release 12 Hour; TAKE 1    TABLET THREE TIMES A DAY AS NEEDED FOR MUSCLE SPASM; Therapy: 08RQY1783 to (Evaluate:90Woo4723)  Requested for: 33NRQ0087; Last    Rx:02Slp1825 Ordered   12  Sleep Oral Capsule; Therapy: 83GNS6519 to Recorded   13  Vitamin B12 TABS; Therapy: (Peggyann Means) to Recorded    The medication list was reviewed and updated today  Allergies    1  Morphine Derivatives   2  Penicillins   3  Sulfa Drugs    Vitals  Signs   Recorded: 86SKE1948 04:05PM   Heart Rate: 84  Systolic: 037  Diastolic: 80  Height: 5 ft 10 in  Weight: 169 lb 6 08 oz  BMI Calculated: 24 3  BSA Calculated: 1 95  Recorded: 54GOT9072 03:59PM   Height: 5 ft 10 in    Physical Exam          Lumbar/Sacral Spine examination demonstrates  independent in ambulation and transfers  Lumbosacral Spine:   Evaluation of Muscle Stretch Reflexes on the right side demonstrates 1/4 Hamstring Reflex, 1/4 Knee Jerk Reflex and 1/4 Ankle Jerk Reflex  Evaluation of Muscle Stretch Reflexes on the left side demonstrates 1/4 Hamstring Reflex and 1/4 Ankle Jerk Reflex, but 2/4 Knee Jerk Reflex  Results/Data    Diagnostic Review minimal progression of listhesis scoliosis remains        Future Appointments    Date/Time Provider Specialty Site   03/21/2017 03:15 PM MARIEL Santo Pain Management [facility] 15     Signatures   Electronically signed by : Rita Silvestre DO; Mar 10 2017  4:52PM EST                       (Author)

## 2018-01-13 NOTE — PROGRESS NOTES
History of Present Illness  Care Coordination Encounter Information:   Type of Encounter: Telephonic   Contact: Follow-Up    Spoke to Patient  Care Coordination SL Nurse [facility]:   The reason for call is to discuss outreach for follow up/needed services and coordination of meeting care plan treatment goals  Outreach TC to patient for CM assessment  Patient reports that he continues to have chronic pain  Patient saw pain management on 10/5  No changes to pain meds  patient had an epidural on 10/10 with slight relief to low back pain and R leg pain  Patient is to have infusion on 10/16 for his RA  Patient denies any problems with bowel/bladder  Patient reports he can walk short distances  Patient usually uses wheelchair for long distances  Patient has F/U on 11/24 with rheumatology  Patient agrees to continued CM  Educated on s/sx to notify PCP/rheumatology /CM  Active Problems    1  (QFT) QuantiFERON-TB test reaction without active tuberculosis (795 52) (R76 12)   2  Abnormal neurological exam (781 99) (R29 90)   3  Acute hip pain (719 45) (M25 559)   4  Analgesic use (V58 69) (Z79 899)   5  Bruise (924 9) (T14 8XXA)   6  Chronic pain syndrome (338 4) (G89 4)   7  Chronic steroid use (V58 65)   8  DDD (degenerative disc disease), lumbosacral (722 52) (M51 37)   9  Depression screen (V79 0) (Z13 89)   10  Esophageal reflux (530 81) (K21 9)   11  Fall with injury (959 9,E888 9) (W19 XXXA)   12  Generalized osteoarthritis (715 00) (M15 9)   13  Hip pain, right (719 45) (M25 551)   14  Insomnia (780 52) (G47 00)   15  Latent tuberculosis (795 51) (R76 11)   16  Left ankle swelling (719 07) (M25 472)   17  Left leg swelling (729 81) (M79 89)   18  Leg hematoma (924 5) (S80 10XA)   19  Levoscoliosis (737 39) (M41 80)   20  Low back pain (724 2) (M54 5)   21  Lumbar post-laminectomy syndrome (722 83) (M96 1)   22  Lumbar radiculopathy (724 4) (M54 16)   23  Multiple joint pain (719 49) (M29 50)   24   Myalgia (729 1) (M79 1)   25  Neoplasm of skin (239 2) (D49 2)   26  On etanercept therapy (V58 69) (Z79 899)   27  Opioid dependence (304 00) (F11 20)   28  Other muscle spasm (728 85) (M62 838)   29  Reactive airway disease (493 90) (J45 909)   30  Restless legs syndrome (333 94) (G25 81)   31  Rheumatoid arthritis involving multiple sites with positive rheumatoid factor (714 0)    (M05 79)   32  Rib pain on right side (786 50) (R07 81)   33  Shoulder injury, left, initial encounter (959 2) (S49 92XA)   34  Sleep apnea (780 57) (G47 30)   35  Spondylolisthesis, grade 1 (738 4) (M43 10)   36  Therapeutic drug monitoring (V58 83) (Z51 81)   37  Visual disturbances (368 9) (H53 9)    Past Medical History    1  History of Acute right lumbar radiculopathy (724 4) (M54 16)   2  History of Acute upper respiratory infection (465 9) (J06 9)   3  History of Encounter for screening for malignant neoplasm of colon (V76 51) (Z12 11)   4  History of arthritis (V13 4) (Z87 39)   5  History of esophageal reflux (V12 79) (Z87 19)   6  History of rheumatoid arthritis (V13 4) (Z87 39)   7  History of Nonvenomous Insect Bite Of Right Upper Arm (912 4)   8  History of Pain in both lower extremities (729 5) (M79 604,M79 605)   9  History of Pain of left shoulder region (719 41) (M25 512)   10  History of Skin rash (782 1) (R21)   11  History of Visit For: Screening Exam Parasitic Infections (V75 8)    Surgical History    1  History of Elbow Surgery   2  History of Foot Surgery   3  History of Knee Surgery   4  History of Palatopharyngoplasty   5  History of Peripheral Nerve Block Wrist Radial Right   6  History of Sinus Surgery   7  History of Tonsillectomy With Adenoidectomy   8  History of Umbilical Hernia Repair    Family History  Mother    1  Family history of congestive heart failure (V17 49) (Z82 49)  Father    2  Family history of transient ischemic attacks (V17 1) (Z82 3)  Brother    3   Family history of gout (V18 19) (Z82 69)  Family History    4  Family history of cerebrovascular accident (V17 1) (Z82 3)   5  Denied: Family history of Crohn's disease   6  Denied: Family history of psoriasis   7  Denied: Family history of rheumatoid arthritis   8  Denied: Family history of systemic lupus erythematosus   9  Denied: Family history of ulcerative colitis    Social History    · Alcohol use (V49 89) (Z78 9)   · Being A Social Drinker   · Drinks coffee   · Never A Smoker   · No drug use    Current Meds    1  Gabapentin 300 MG Oral Capsule; Two tabs in a m  4 in PM;   Therapy: (Recorded:19Fxu0794) to Recorded    2  Endocet 7 5-325 MG Oral Tablet; TAKE 1 TABLET EVERY 6 HOURS AS NEEDED FOR   PAIN;   Therapy: 99Lrc2291 to (Evaluate:06Tid2750); Last Rx:05Oct2017 Ordered    3  Esomeprazole Magnesium 40 MG Oral Capsule Delayed Release (NexIUM); TAKE 1   CAPSULE DAILY AS NEEDED FOR GASTROESOPHAGEAL REFLUX DISEASE; Therapy: 98QHX4729 to (Last Rx:73Jcd9423)  Requested for: 21Aug2017 Ordered    4  Zolpidem Tartrate 10 MG Oral Tablet; TAKE 1 TABLET AT BEDTIME AS NEEDED FOR   SLEEP; Therapy: 66ZVC8980 to (); Last Rx:80Uqe4724 Ordered    5  Orphenadrine Citrate  MG Oral Tablet Extended Release 12 Hour; TAKE 1   TABLET THREE TIMES A DAY AS NEEDED FOR MUSCLE SPASM; Therapy: 25TGR0265 to (Last Rx:41Tgl2669)  Requested for: 05Sep2017 Ordered    6  PredniSONE 5 MG Oral Tablet; take 2 tablet daily; Therapy: (Recorded:13Sep2017) to Recorded    7  CeleBREX 200 MG Oral Capsule (Celecoxib); Take 1 capsule twice daily; Therapy: (Recorded:10Aug2017) to Recorded    8  Allergy TABS; Therapy: (Recorded:13Sep2017) to Recorded    Allergies    1  Penicillins   2  Morphine Derivatives   3  Sulfa Drugs    End of Encounter Meds    1  Gabapentin 300 MG Oral Capsule; Two tabs in a m  4 in PM;   Therapy: (Recorded:13Sep2017) to Recorded    2   Endocet 7 5-325 MG Oral Tablet; TAKE 1 TABLET EVERY 6 HOURS AS NEEDED FOR   PAIN;   Therapy: 69Tkq0958 to (Evaluate:85Uyd7776); Last Rx:05Oct2017 Ordered    3  Esomeprazole Magnesium 40 MG Oral Capsule Delayed Release (NexIUM); TAKE 1   CAPSULE DAILY AS NEEDED FOR GASTROESOPHAGEAL REFLUX DISEASE; Therapy: 06ZSX5514 to (Last Rx:76Isu8113)  Requested for: 21Aug2017 Ordered    4  Zolpidem Tartrate 10 MG Oral Tablet; TAKE 1 TABLET AT BEDTIME AS NEEDED FOR   SLEEP; Therapy: 84DGT2802 to (21 ); Last Rx:06Ezr7186 Ordered    5  Orphenadrine Citrate  MG Oral Tablet Extended Release 12 Hour; TAKE 1   TABLET THREE TIMES A DAY AS NEEDED FOR MUSCLE SPASM; Therapy: 95SCT1990 to (Last Rx:05Sep2017)  Requested for: 05Sep2017 Ordered    6  PredniSONE 5 MG Oral Tablet; take 2 tablet daily; Therapy: (Recorded:99Pca0728) to Recorded    7  CeleBREX 200 MG Oral Capsule (Celecoxib); Take 1 capsule twice daily; Therapy: (Recorded:10Aug2017) to Recorded    8  Allergy TABS;    Therapy: (Recorded:59Bbf4022) to Recorded    Future Appointments    Date/Time Provider Specialty Site   [facility]2017 03[facility] [address] [facility]     Patient Care Team    Care Team Member Role Specialty Office Number   Rose Mary ROBERTS MD  Rheumatology [phone]   Dawit Mcduffie DO Specialist Physiatry [phone]   Drake Chen DO Attending Family Medicine [phone]   Vanessa Chapman  Nurse Practitioner [phone]   [address] MD  Pain Management [phone]   [place] FLOYD  Pain Management [phone]     Signatures   Electronically signed by : Marilu Desir RN; Oct 12 2017  9:53AM EST                       (Author)

## 2018-01-14 VITALS
WEIGHT: 169.38 LBS | HEIGHT: 70 IN | DIASTOLIC BLOOD PRESSURE: 90 MMHG | SYSTOLIC BLOOD PRESSURE: 140 MMHG | BODY MASS INDEX: 24.25 KG/M2 | TEMPERATURE: 98.9 F | HEART RATE: 87 BPM

## 2018-01-14 VITALS
DIASTOLIC BLOOD PRESSURE: 88 MMHG | WEIGHT: 169.25 LBS | HEIGHT: 70 IN | SYSTOLIC BLOOD PRESSURE: 140 MMHG | BODY MASS INDEX: 24.23 KG/M2

## 2018-01-15 NOTE — MISCELLANEOUS
Message   Recorded as Task   Date: 07/12/2017 02:33 PM, Created By: Rivka Lou   Task Name: Miscellaneous   Assigned To: [facility] [facility],Team   Regarding Patient: Angie Johnson, Status: Active   Comment:    Miriam Castillo - 12 Jul 2017 2:33 PM     TASK CREATED  Pt's wife called stating on the bottle Endocet-Oxycodone cannot be filled until 7/17 but they are leaving for vacation [place] on the morning of 7/15  Julio Pace can be reached for the next 2 hours at [phone]  Jaimee Guadarrama - 12 Jul 2017 2:42 PM     TASK EDITED  pt is going on vaction and can not get his endocet-oxycodone filled untill 7/17 due to the way the prescription is written, they are leaving the morning 7/15  Pt wife said she called the insurance company , and [employer] stated that they would fill the script if it is written for s sooner date    *******-please advise******   DiazMiriam jewell - 12 Jul 2017 4:18 PM     TASK EDITED  Julio Pace called again requesting a callback  She can be reached at [phone]  Annmarie Roach - 12 Jul 2017 4:29 PM     TASK REPLIED TO: Previously Assigned To Annmarie Roach  I write the prescriptions for every 30 days -- that is just the way they are written and how our opioid agreement with the patient is  I don't write prescriptions earlier than the 30 days  Jaimee Guadarrama - 13 Jul 2017 8:17 AM     TASK EDITED  ******Milton Sam******    Spoke with pt and his wife , both very rude toward this nurse, and very upset that you will not re-write this prescription  They where both yelling "why do i have to rearang our lives because of the doctor"    Also stated that they will be looking for another pain specailist     Also explained to the pt and his wife that they signed the opioid agreement and that is how it is written in the agreement  Annmarie Roach - 13 Jul 2017 12:29 PM     TASK REPLIED TO: Previously Assigned To [place]  Thanks      Please flag in IDX that the patient will not be returning and would like to find a new pain specialist         Active Problems    1  Abnormal neurological exam (781 99) (R29 90)   2  Acute hip pain (719 45) (M25 559)   3  Acute upper respiratory infection (465 9) (J06 9)   4  Analgesic use (V58 69) (Z79 899)   5  Bruise (924 9) (T14 8)   6  Chronic pain syndrome (338 4) (G89 4)   7  Chronic steroid use (V58 65)   8  DDD (degenerative disc disease), lumbosacral (722 52) (M51 37)   9  Depression screen (V79 0) (Z13 89)   10  Encounter for screening for malignant neoplasm of colon (V76 51) (Z12 11)   11  Esophageal reflux (530 81) (K21 9)   12  Fall with injury (959 9,E888 9) (W19 XXXA)   15  Hip pain, right (719 45) (M25 551)   14  Left ankle swelling (719 07) (M25 472)   15  Left leg swelling (729 81) (M79 89)   16  Leg hematoma (924 5) (S80 10XA)   17  Levoscoliosis (737 39) (M41 80)   18  Low back pain (724 2) (M54 5)   19  Lumbar post-laminectomy syndrome (722 83) (M96 1)   20  Lumbar radiculopathy (724 4) (M54 16)   21  Multiple joint pain (719 49) (M25 50)   22  Myalgia (729 1) (M79 1)   23  Neoplasm of skin (239 2) (D49 2)   24  Opioid dependence (304 00) (F11 20)   25  Other muscle spasm (728 85) (M62 838)   26  Pain in both lower extremities (729 5) (M79 604,M79 605)   27  Reactive airway disease (493 90) (J45 909)   28  Restless legs syndrome (333 94) (G25 81)   29  Rheumatoid arthritis (714 0) (M06 9)   30  Rib pain on right side (786 50) (R07 81)   31  Shoulder injury, left, initial encounter (959 2) (S49 92XA)   32  Skin rash (782 1) (R21)   33  Sleep apnea (780 57) (G47 30)   34  Spondylolisthesis, grade 1 (738 4) (M43 10)   35  Visual disturbances (368 9) (H53 9)    Current Meds   1  B-Complex TABS; Therapy: (Recorded:07Nov2012) to Recorded   2  Calcium 500 500 MG TABS; Therapy: (Recorded:03Jul2017) to Recorded   3   Celecoxib 200 MG Oral Capsule (CeleBREX); TAKE 1 CAPSULE DAILY AS NEEDED;   Last Rx:10Jun2015 Ordered 4  Collagen Hydrolysate Powder; Therapy: (Recorded:35Vyk9169) to Recorded   5  Enbrel 50 MG/ML SOLN; INJECT 50MG SUBCUTANEOUSLY WEEKLY Recorded   6  Endocet 7 5-325 MG Oral Tablet; TAKE 1 TABLET EVERY 6 HOURS AS NEEDED FOR   PAIN;   Therapy: 83Rso6164 to (Evaluate:37Hip3410); Last Rx:2017 Ordered   7  Esomeprazole Magnesium 40 MG Oral Capsule Delayed Release (NexIUM); TAKE 1   CAPSULE DAILY AS NEEDED FOR GASTROESOPHAGEAL REFLUX DISEASE; Therapy: 54KIM5230 to (Last Rx:2017)  Requested for: 2017 Ordered   8  Gabapentin 600 MG Oral Tablet; TAKE 1 5 TABLET 3 times daily; Therapy: 71XAZ1063 to (Evaluate:2017)  Requested for: 58GVW6431; Last   Rx:2017 Ordered   9  Glucosamine TABS; Therapy: () to Recorded   10  Magnesium 500 MG Oral Capsule; Therapy: (CKDBVPHE:71BDU3001) to Recorded   11  Milk Thistle 175 MG Oral Capsule; Therapy: (ILTNJPYD:16LQT2961) to Recorded   12  Orphenadrine Citrate  MG Oral Tablet Extended Release 12 Hour; TAKE 1    TABLET THREE TIMES A DAY AS NEEDED FOR MUSCLE SPASM; Therapy: 48HMR8610 to (Last Rx:2017)  Requested for: 48PNG7626 Ordered   13  Potassium Citrate Granules; Therapy: (ELCGJKO91CIH2594) to Recorded   14  Sleep Oral Capsule; Therapy: 90XIT7502 to Recorded   15  Turmeric Curcumin Oral Capsule; Therapy: (NJUEOOXP:99EAB6468) to Recorded   16  Vitamin B12 TABS; Therapy: () to Recorded   17  Zinc TABS; Therapy: (Recorded:22Muy3258) to Recorded    Allergies    1  Penicillins   2  Morphine Derivatives   3   Sulfa Drugs    Signatures   Electronically signed by : Sha Dietz, ; 2017 12:36PM EST                       (Author)

## 2018-01-15 NOTE — MISCELLANEOUS
Message   Recorded as Task   Date: 06/08/2017 02:12 PM, Created By: Tina Silvestre   Task Name: Care Coordination   Assigned To: [facility] stim,Team   Regarding Patient: Mechele Osgood, Status: In Progress   Nikochantel Watters - 08 Jun 2017 2:12 PM     TASK CREATED  Received a vm from pt's wife about attending stim education for [facility]  Attempt to reach pt on number provided ([phone]) and LM that there is a class today, 6/8/17 @ 1500 at the [facility]  aSndy yCr - 08 Jun 2017 2:12 PM     TASK IN PROGRESS   JordinSaumya - 26 Jun 2017 12:12 PM     TASK EDITED  Pts wife returned call - LM w/service at 11:19 a m  Please call back   Sandy Cyr - 26 Jun 2017 3:20 PM     TASK EDITED  Spoke with pt's who was actually calling about having another inj nothing in regards to stim  Wife was transferred to [facility]  Active Problems    1  (QFT) QuantiFERON-TB test reaction without active tuberculosis (795 52) (R76 12)   2  Abnormal neurological exam (781 99) (R29 90)   3  Acute hip pain (719 45) (M25 559)   4  Analgesic use (V58 69) (Z79 899)   5  Bruise (924 9) (T14 8XXA)   6  Chronic pain syndrome (338 4) (G89 4)   7  Chronic steroid use (V58 65)   8  DDD (degenerative disc disease), lumbosacral (722 52) (M51 37)   9  Depression screen (V79 0) (Z13 89)   10  Esophageal reflux (530 81) (K21 9)   11  Fall with injury (959 9,E888 9) (W19 XXXA)   12  Generalized osteoarthritis (715 00) (M15 9)   13  Hip pain, right (719 45) (M25 551)   14  Insomnia (780 52) (G47 00)   15  Latent tuberculosis (795 51) (R76 11)   16  Left ankle swelling (719 07) (M25 472)   17  Left leg swelling (729 81) (M79 89)   18  Leg hematoma (924 5) (S80 10XA)   19  Levoscoliosis (737 39) (M41 80)   20  Low back pain (724 2) (M54 5)   21  Lumbar post-laminectomy syndrome (722 83) (M96 1)   22  Lumbar radiculopathy (724 4) (M54 16)   23  Multiple joint pain (719 49) (M25 50)   24  Myalgia (729 1) (M79 1)   25  Neoplasm of skin (239 2) (D49 2)   26  On etanercept therapy (V58 69) (Z79 899)   27  Opioid dependence (304 00) (F11 20)   28  Other muscle spasm (728 85) (M62 838)   29  Reactive airway disease (493 90) (J45 909)   30  Restless legs syndrome (333 94) (G25 81)   31  Rheumatoid arthritis involving multiple sites with positive rheumatoid factor (714 0)    (M05 79)   32  Rib pain on right side (786 50) (R07 81)   33  Shoulder injury, left, initial encounter (959 2) (S49 92XA)   34  Sleep apnea (780 57) (G47 30)   35  Spondylolisthesis, grade 1 (738 4) (M43 10)   36  Therapeutic drug monitoring (V58 83) (Z51 81)   37  Visual disturbances (368 9) (H53 9)    Current Meds   1  Allergy TABS; Therapy: (Recorded:13Sep2017) to Recorded   2  CeleBREX 200 MG Oral Capsule (Celecoxib); Take 1 capsule twice daily; Therapy: (Recorded:10Aug2017) to Recorded   3  Endocet 7 5-325 MG Oral Tablet; TAKE 1 TABLET EVERY 6 HOURS AS NEEDED FOR   PAIN;   Therapy: 49Arv5466 to (Evaluate:52Omj2162); Last Rx:05Oct2017 Ordered   4  Esomeprazole Magnesium 40 MG Oral Capsule Delayed Release (NexIUM); TAKE 1   CAPSULE DAILY AS NEEDED FOR GASTROESOPHAGEAL REFLUX DISEASE; Therapy: 66NZR4994 to (Last Rx:92Rit6749)  Requested for: 40Uav5955 Ordered   5  Gabapentin 300 MG Oral Capsule; Two tabs in a m  4 in PM;   Therapy: (Recorded:13Sep2017) to Recorded   6  Orphenadrine Citrate  MG Oral Tablet Extended Release 12 Hour; TAKE 1   TABLET THREE TIMES A DAY AS NEEDED FOR MUSCLE SPASM; Therapy: 61CXK4313 to (Last Rx:89Kqt7615)  Requested for: 05Sep2017 Ordered   7  PredniSONE 5 MG Oral Tablet; take 2 tablet daily; Therapy: (Recorded:13Sep2017) to Recorded   8  Zolpidem Tartrate 10 MG Oral Tablet; TAKE 1 TABLET AT BEDTIME AS NEEDED FOR   SLEEP; Therapy: 38JXT1679 to [phone]); Last Rx:01Tdx7315 Ordered    Allergies    1  Penicillins   2  Morphine Derivatives   3   Sulfa Drugs    Signatures   Electronically signed by : Broderick Castellanos Danae Lambert, ; Oct 12 2017  3:09PM EST                       (Author)

## 2018-01-15 NOTE — RESULT NOTES
Verified Results  * MRI LUMBAR Porfirio Fang [address] [id number] 10:09PM Link Racer Order Number: [id number]   Performing Comments: prior fusion, absent refexes, truncal weakness    - Patient Instructions: To be done on Tuesday, Janaury 10, 10:00 p m  at [facility]  Please register at the Emergency Entrance  1   Please arrive 15 minutes early to register  2   PLEASE BRING THIS ORDER WITH YOU, along with your insurance cards, photo ID and list of medications  3  No metal or jewelry  Test Name Result Flag Reference   MRI LUMBAR SPINE [address] (Report)     This is a summary report  The complete report is available in the patient's medical record  If you cannot access the medical record, please contact the sending organization for a detailed fax or copy  MRI LUMBAR SPINE WITH AND WITHOUT CONTRAST     INDICATION: r29 90, prior surgery, absent reflexes, truncal weakness History: pt co severe low back pain, leg cramps     COMPARISON: 5/2/2015     TECHNIQUE: Sagittal T1, sagittal T2, sagittal inversion recovery, axial T1 and axial T2, coronal T2  Sagittal and axial T1 postcontrast      IV Contrast: gadobutrol injection (MULTI-DOSE) SOLN 8 mL Note: (SINGLE DOSE/MULTI DOSE) information refers to the container from which the contrast was acquired  Contrast was injected one time intravenously without immediate complication  IMAGE QUALITY: Diagnostic     FINDINGS:     ALIGNMENT: Moderate levoscoliosis mid lumbar spine, apex at the L2-3 intervertebral disc space  Trace grade 1 anterolisthesis L4 on L5 is new from the previous study  Trace grade 1 retrolisthesis of L1 on L2 is retrospectively present and stable  MARROW SIGNAL: Artifact from bilateral hip prostheses noted  There is been interval laminectomies L3-4 and L4-5 and L5-S1  Scattered multilevel degenerative endplate changes noted  No compression abnormalities  No focally suspicious marrow lesions        DISTAL CORD AND CONUS: Normal size and signal of the distal cord and conus  The conus ends at the T12 level  PARASPINAL SOFT TISSUES: Mild to moderate fatty atrophy of the left psoas muscle is similar to the previous study     SACRUM: Normal signal within the sacrum  No evidence of insufficiency or stress fracture  LOWER THORACIC DISC SPACES: T10-T11 and T11-T12 demonstrate mild loss of disc height and signal      T12-L1: Small central disc herniation, extrusion type into the right of midline  Mild central canal narrowing  Neural foramina bilaterally patent  This level is similar to the prior study  LUMBAR DISC SPACES:        L1-L2: Minimal uncovering the intervertebral disc space  There is a diffuse disk bulge  No significant central canal or neural foraminal narrowing  This level is similar to the prior study  L2-L3: Decompressive laminectomy at this level  Small amount of enhancing scar tissue along the posterior margin of the intervertebral disc  No evidence of recurrent or residual disc herniation  L3-L4: Interval decompressive laminectomy  Central canal surgically capacious  Enhancing scar tissue along the posterior margin of the intervertebral disc space, to the right of midline  No significant associated central canal narrowing  Mild to    moderate right neural foraminal narrowing secondary to scar tissue  Left neural foramen mildly narrowed secondary to facet disease  L4-L5: Decompressive laminectomy noted  Bilateral facet disease noted  There is a diffuse disc bulge  Central canal decompressive surgically capacious  Mild to moderate left greater than right neural foraminal narrowing secondary to facet disease    redemonstrated  L5-S1: There is a diffuse disk bulge  No significant central canal or neural foraminal narrowing  Bilateral facet hypertrophy noted  POSTCONTRAST IMAGING: Mild enhancing scar tissue ad L2-3 and L3-4 without evidence of recurrent disc herniation  IMPRESSION:       1  Interval laminectomies L3-L5 with surgically capacious central canals  No evidence of recurrent or residual disc herniation  Small amount of scar tissue in theat L3-4 and L4-5    2  Mild to moderate atrophy of the left psoas muscle, retrospectively stable   3  Moderate levoscoliosis and trace grade 1 retrolisthesis of L1 on L2 are stable  There is a new minimal grade 1 anterolisthesis of L4 on L5  Workstation performed: [id number]     Signed by:    Cary Richey MD   1/11/17

## 2018-01-15 NOTE — MISCELLANEOUS
Message   Recorded as Task   Date: 03/30/2017 03:49 PM, Created By: Alexander Salcedo   Task Name: Miscellaneous   Assigned To: [facility] clerical,Team   Regarding Patient: Randy Rosario, Status: Active   Comment:    Alexander Matias - 30 Mar 2017 3:49 PM     TASK CREATED  Pt's wife came in to  script  She is requesting pt have another injection done per Dr Melba Medina recommendation  Is this something that can be done? If not pt's wife would like to see what else we can do  She states he is in a lot of pain and his quality of life is going down bc his pain is not being managed  Please call pt on home number to schedule or w/ recommendation  Thank you  Renan García - 31 Mar 3092 4:09 PM     TASK EDITED   [place] - 31 Mar 2017 3:03 PM     TASK REASSIGNED: Previously Assigned To [facility] surgery sched,Team  Pt saw Dr Krystin Charles on 3/29, per office note- pt being referred back to you to evaluate for R L2-3, L3-4 TFESI and facet injections  Per below task, pt wants to schedule a procedure as soon as possible  Is this pt able to be scheduled for a procedure without an OV? Please advise  [facility] - 03 Apr 2017 11:01 AM     TASK REPLIED TO: Previously Assigned To [address] [address]  Schedule next available office visit with myself, Jesse Seth, or Richa Olivera - 03 Apr 2017 11:17 AM     TASK REASSIGNED: Previously Assigned To [facility] surgery sched,Team   Saumya Brenner - 04 Apr 2017 9:23 AM     TASK EDITED  PT IS SCHEDULED FOR 4/4/17 AT 3        Active Problems    1  Abnormal neurological exam (781 99) (R29 90)   2  Acute hip pain (719 45) (M25 559)   3  Acute upper respiratory infection (465 9) (J06 9)   4  Analgesic use (V58 69) (Z79 899)   5  Bruise (924 9) (T14 8)   6  Chronic pain syndrome (338 4) (G89 4)   7  Chronic steroid use (V58 65)   8  DDD (degenerative disc disease), lumbosacral (722 52) (M51 37)   9  Depression screen (V79 0) (Z13 89)   10   Encounter for screening for malignant neoplasm of colon (V76 51) (Z12 11)   11  Esophageal reflux (530 81) (K21 9)   12  Fall with injury (959 9,E888 9) (W19 XXXA)   15  Hip pain, right (719 45) (M25 551)   14  Left ankle swelling (719 07) (M25 472)   15  Left leg swelling (729 81) (M79 89)   16  Leg hematoma (924 5) (S80 10XA)   17  Levoscoliosis (737 39) (M41 80)   18  Low back pain (724 2) (M54 5)   19  Lumbar post-laminectomy syndrome (722 83) (M96 1)   20  Lumbar radiculopathy (724 4) (M54 16)   21  Multiple joint pain (719 49) (M25 50)   22  Myalgia (729 1) (M79 1)   23  Neoplasm of skin (239 2) (D49 2)   24  Opioid dependence (304 00) (F11 20)   25  Other muscle spasm (728 85) (M62 838)   26  Pain in both lower extremities (729 5) (M79 604,M79 605)   27  Reactive airway disease (493 90) (J45 909)   28  Restless legs syndrome (333 94) (G25 81)   29  Rheumatoid arthritis (714 0) (M06 9)   30  Skin rash (782 1) (R21)   31  Sleep apnea (780 57) (G47 30)   32  Spondylolisthesis, grade 1 (738 4) (M43 10)   33  Visual disturbances (368 9) (H53 9)    Current Meds   1  B-Complex TABS; Therapy: (Recorded:07Nov2012) to Recorded   2  Celecoxib 200 MG Oral Capsule (CeleBREX); TAKE 1 CAPSULE DAILY AS NEEDED;   Last Rx:10Jun2015 Ordered   3  Collagen Hydrolysate Powder; Therapy: (Recorded:45Ois7560) to Recorded   4  Enbrel 50 MG/ML SOLN; INJECT 50MG SUBCUTANEOUSLY WEEKLY Recorded   5  Endocet 7 5-325 MG Oral Tablet; TAKE 1 TABLET EVERY 6 HOURS AS NEEDED FOR   PAIN;   Therapy: 93Ugx6570 to (Evaluate:20Apr2017); Last Rx:28Mar2017 Ordered   6  Esomeprazole Magnesium 40 MG Oral Capsule Delayed Release (NexIUM); TAKE 1   CAPSULE DAILY AS NEEDED FOR GASTROESOPHAGEAL REFLUX DISEASE; Therapy: 70VCO2483 to (Last Rx:22Feb2017)  Requested for: 22Feb2017 Ordered   7  Gabapentin 300 MG Oral Capsule; TAKE 2 CAPSULES 3 TIMES DAILY; Therapy: 56REP9665 to (Evaluate:25Mar2017)  Requested for: 69XKK7068; Last   Rx:24Jan2017 Ordered   8   Gabapentin 600 MG Oral Tablet; TAKE 1 TABLET 3 times daily; Therapy: 20GNQ4468 to ([phone])  Requested for: 26Jan2017; Last   Rx:26Jan2017 Ordered   9  Gabapentin 600 MG Oral Tablet; Therapy: (Recorded:24Jan2017) to Recorded   10  Glucosamine TABS; Therapy: (0523-9884216) to Recorded   11  Orphenadrine Citrate  MG Oral Tablet Extended Release 12 Hour; TAKE 1    TABLET THREE TIMES A DAY AS NEEDED FOR MUSCLE SPASM; Therapy: 95RGU4656 to (Evaluate:96Yhc1572)  Requested for: 40DAS3616; Last    Rx:28Moh8411 Ordered   12  Sleep Oral Capsule; Therapy: 22HJL1248 to Recorded   13  Vitamin B12 TABS; Therapy: (Recorded:07Nov2012) to Recorded    Allergies    1  Morphine Derivatives   2  Penicillins   3  Sulfa Drugs    Signatures   Electronically signed by :  Merlin Velez, ; Apr 4 2017  9:25AM EST                       (Author)

## 2018-01-15 NOTE — MISCELLANEOUS
Message   Recorded as Task   Date: 03/20/2017 01:17 PM, Created By: Yaneth Montez   Task Name: Miscellaneous   Assigned To: [facility] [facility],Team   Regarding Patient: Carmen Lyons, Status: Active   Comment:    LeighannLeonie crooks - 20 Mar 2017 1:17 PM     TASK CREATED  pts apt was rescheduled for tomorrow due to [facility] being out  pt will need meds before his next appt  please call pt at [phone]   Macy Reeves - 20 Mar 2017 1:28 PM     TASK IN PROGRESS   Macy Reeves - 20 Mar 2017 1:35 PM     TASK EDITED  Spoke to pt he takes oxycodone 7 5/325mg one tab every 6 hours  Pt has #44 tabs remaining but will not have enough until his rescheduled appt for 4/21/17 w/Dr Chuck Givens  [address] - 21 Mar 2017 1:49 PM     TASK REPLIED TO: Previously Assigned To [address]  Ask him to call back 2-3 days before he is due next  Merle Antonio - 21 Mar 2017 1:56 PM     TASK EDITED  S/w pt  Advised pt of the same  Active Problems    1  Abnormal neurological exam (781 99) (R29 90)   2  Acute hip pain (719 45) (M25 559)   3  Acute upper respiratory infection (465 9) (J06 9)   4  Analgesic use (V58 69) (Z79 899)   5  Bruise (924 9) (T14 8)   6  Chronic pain syndrome (338 4) (G89 4)   7  Chronic steroid use (V58 65)   8  DDD (degenerative disc disease), lumbosacral (722 52) (M51 37)   9  Depression screen (V79 0) (Z13 89)   10  Encounter for screening for malignant neoplasm of colon (V76 51) (Z12 11)   11  Esophageal reflux (530 81) (K21 9)   12  Fall with injury (959 9,E888 9) (W19 XXXA)   15  Hip pain, right (719 45) (M25 551)   14  Left ankle swelling (719 07) (M25 472)   15  Left leg swelling (729 81) (M79 89)   16  Leg hematoma (924 5) (S80 10XA)   17  Levoscoliosis (737 39) (M41 80)   18  Low back pain (724 2) (M54 5)   19  Lumbar post-laminectomy syndrome (722 83) (M96 1)   20  Multiple joint pain (719 49) (M25 50)   21  Myalgia (729 1) (M79 1)   22  Neoplasm of skin (239 2) (D49 2)   23   Opioid dependence (304 00) (F11 20)   24  Other muscle spasm (728 85) (M62 838)   25  Pain in both lower extremities (729 5) (M79 604,M79 605)   26  Reactive airway disease (493 90) (J45 909)   27  Restless legs syndrome (333 94) (G25 81)   28  Rheumatoid arthritis (714 0) (M06 9)   29  Skin rash (782 1) (R21)   30  Sleep apnea (780 57) (G47 30)   31  Spondylolisthesis, grade 1 (738 4) (M43 10)   32  Visual disturbances (368 9) (H53 9)    Current Meds   1  B-Complex TABS; Therapy: (Recorded:07Nov2012) to Recorded   2  Celecoxib 200 MG Oral Capsule (CeleBREX); TAKE 1 CAPSULE DAILY AS NEEDED;   Last Rx:10Jun2015 Ordered   3  Collagen Hydrolysate Powder; Therapy: (Recorded:31Usr3361) to Recorded   4  Enbrel 50 MG/ML SOLN; INJECT 50MG SUBCUTANEOUSLY WEEKLY Recorded   5  Endocet 7 5-325 MG Oral Tablet; TAKE 1 TABLET EVERY 6 HOURS AS NEEDED FOR   PAIN;   Therapy: 94Cim7780 to (Evaluate:24Mar2017); Last Rx:24Jan2017 Ordered   6  Esomeprazole Magnesium 40 MG Oral Capsule Delayed Release (NexIUM); TAKE 1   CAPSULE DAILY AS NEEDED FOR GASTROESOPHAGEAL REFLUX DISEASE; Therapy: 57QZT9580 to (Last Rx:22Feb2017)  Requested for: 22Feb2017 Ordered   7  Gabapentin 300 MG Oral Capsule; TAKE 2 CAPSULES 3 TIMES DAILY; Therapy: 36ZKG8412 to (Evaluate:25Mar2017)  Requested for: 49OGK9916; Last   Rx:24Jan2017 Ordered   8  Gabapentin 600 MG Oral Tablet; TAKE 1 TABLET 3 times daily; Therapy: 80DNQ9504 to (Celestia Radha)  Requested for: 26Jan2017; Last   Rx:26Jan2017 Ordered   9  Gabapentin 600 MG Oral Tablet; Therapy: (Recorded:24Jan2017) to Recorded   10  Glucosamine TABS; Therapy: (Zoe Billet) to Recorded   11  Orphenadrine Citrate  MG Oral Tablet Extended Release 12 Hour; TAKE 1    TABLET THREE TIMES A DAY AS NEEDED FOR MUSCLE SPASM; Therapy: 83HGJ8353 to (Evaluate:68Xmg5870)  Requested for: 36ORN9631; Last    Rx:02Jfu3004 Ordered   12  Sleep Oral Capsule; Therapy: 41SPR8944 to Recorded   13   Vitamin B12 TABS;    Therapy: (Recorded:07Nov2012) to Recorded    Allergies    1  Morphine Derivatives   2  Penicillins   3   Sulfa Drugs    Signatures   Electronically signed by : Xin De La Rosa, ; Mar 21 2017  1:56PM EST                       (Author)

## 2018-01-16 NOTE — RESULT NOTES
Message   Recorded as Task   Date: 10/19/2017 01:36 PM, Created By: Aakash Brooks   Task Name: Follow Up   Assigned To: [facility] [facility],Team   Regarding Patient: Carmen García, Status: Active   Comment:    Anahy Costa - 19 Oct 2017 1:36 PM     TASK CREATED  L/m to return call with %  of relief  S/p CAUDAL LISBETH #3- done 10/10/17 by Anahy Prince - 19 Oct 2017 1:36 PM     TASK EDITED   Aakash Brooks - 20 Oct 2017 12:49 PM     TASK EDITED  Patient reports he received  50%relief from his procedure  Patient aware of the 2wk wait  F/u ccall next wk    S/p CAUDAL LISBETH done 10/10/17  Prmea Dunn - 23 Oct 2017 4:21 PM     TASK REPLIED TO: Previously Assigned To Vahe Moulton - 26 Oct 7849 84:04 AM     TASK EDITED  Patient reports that he is feeling a little better  Patient has f/u 11/29/17   Sabrina Mao - 26 Oct 2017 11:37 AM     TASK REPLIED TO: Previously Assigned To [place]  Thanks          Signatures   Electronically signed by : Jose Mello, ; Oct 26 2017 12:48PM EST                       (Author)

## 2018-01-16 NOTE — PROGRESS NOTES
Assessment    1  Rheumatoid arthritis involving multiple sites with positive rheumatoid factor (714 0)   (M05 79)   2  Generalized osteoarthritis (715 00) (M15 9)   3  Lumbar radiculopathy (724 4) (M54 16)   4  Chronic pain syndrome (338 4) (G89 4)   5  On etanercept therapy (V58 69) (Z79 899)   6  Esophageal reflux (530 81) (K21 9)    Plan  On etanercept therapy, Rheumatoid arthritis involving multiple sites with positive  rheumatoid factor    · (1) CBC/PLT/DIFF; Status:Active; Requested for:10Aug2017;    · (1) CHRONIC HEPATITIS PANEL; Status:Active; Requested for:67Vfr0483;    · (1) COMPREHENSIVE METABOLIC PANEL; Status:Active; Requested for:10Aug2017;    · (1) C-REACTIVE PROTEIN; Status:Active; Requested for:10Aug2017;    · (1) HIV AG/AB COMBO, 4TH GEN; [Do Not Release]; Status:Active; Requested  for:10Aug2017;    · (1) LIPID PANEL FASTING W DIRECT LDL REFLEX; Status:Active; Requested  for:10Aug2017;    · (1) QUANTIFERON - TB GOLD; Status:Active; Requested for:10Aug2017;    · (1) SED RATE; Status:Active; Requested for:10Aug2017;    · Follow-up visit in 2 months Evaluation and Treatment  Follow-up  Status: Hold For -  Scheduling  Requested for: 10Aug2017  Rheumatoid arthritis involving multiple sites with positive rheumatoid factor    · Call [phone] if: The pain seems worse ; Status:Complete;   Done: 28JBO0975   · Call [phone] if: The symptoms seem worse ; Status:Complete;   Done:  70YGX7476   · Call [phone] if: You have questions or concerns about your problem ;  Status:Complete;   Done: 95PJQ4441    Discussion/Summary    Mr Andreas Lozano is a 42-year-old  male who presents the office with a history of rheumatoid arthritis since 1993  He was previously under the care of Dr Coby Hyde at [facility]  He states that he was initially treated with gold and methotrexate   Ultimately, methotrexate and gold were both discontinued, and he was placed on Enbrel for the last 15-20 years  He has also recently been on Celebrex, and he does report that he has been on prednisone in the past  He does see pain management, and he was started on gabapentin for his chronic pain without much relief  He recently saw Dr Beto Palacios who recommended possibly changing his biologic to Rituxan  This decision was made based on the fact that he continues to have disease progression despite Enbrel  Planes of pain mainly in his back and his feet  He has had multiple orthopedic surgeries to help he states his back and hips are his major areas of pain  He has undergone spinal surgery for degenerative disc disease, and also carries a history of scoliosis  He is currently going to physical therapy  He does report morning stiffness typically lasting several hours before improvement  He does report difficulty sleeping due to pain at night, as well as restless legs  He does report nonrestorative sleep and fatigue  On exam, there is synovitis of the MCPs and PIPs of the hands, as well as the wrists and elbows  There is no other active synovitis  He does have chronic rheumatoid deformities of the bilateral hands, as well as osteoarthritic changes of the hands  There is crepitus of the bilateral knees  There is a notable scoliotic curve  No recent rheumatologic labs were available for review today, but a previously noted rheumatoid factor from 2013 was 3310  At this time, his seropositive rheumatoid arthritis does appear active and uncontrolled despite Enbrel  I did explain the TNF inhibitors typically have greater efficacy with methotrexate on board, and this may be why he has not had an adequate response  I did explain the Rituxan would be a reasonable choice, but there are several other Biologics available that may be more beneficial  We did discuss several treatment options, and we have opted to start Actemra 162 mg subcutaneously every other week  We will obtain a prior authorization for this   I will check a CBC, CMP, ESR, CRP, fasting lipid profile, hepatitis panel, HIV, and QuantiFERON goal for TB in preparation for the use of Actemra  I will also obtain records from Dr Rhys Alvarez office  He will discontinue Enbrel at this time  I will reevaluate him in 2 months  He will call in the interim if there are any questions or concerns  Patient is able to Self-Care  Counseling  Rheumatology Counseling Documentation: The patient was counseled regarding diagnostic results, instructions for management, impressions and risks and benefits of treatment options  The following educational handouts were provided: Tocilizumab and Rituximab  Chief Complaint  History of RA      History of Present Illness  Pt  is a 61 yo  male who presents with history of RA previously under the care of Dr Guillaume Bailey  Dx'ed in 1993  No medications changes have been made for many years  Has history of RF elevated above 3000 before  Has recently been on Enbrel, as well Celebrex and Prednisone  Sees Pain Mgmt  as well who started Gabapentin which does not help with pain  Previously took gold and MTX  MTX was stopped when Enbrel started  Dr Guillaume Bailey recently recommended Rituxan  Still having disease progression despite Enbrel  c/o pain in back and feet  Has had multiple surgeries as well  Works through the pain most of the time  Had history of OA as well  Back and hips are major pain areas  Has had back surgery as well for DDD  Has scoliosis as well  Going to PT now  + AM stiffness x several hours  + difficulty sleeping due to pain  + RLS at night  + non-restorative sleep  + fatigue  RAPID3: 23 5/30      Review of Systems    Constitutional: fatigue and recent 10-15 lb weight loss, but no fever, no recent weight gain, no chills and no anorexia  HEENT: blurred vision, dryness of the eyes, dryness mouth and feeling congested, but no double vision, no amaurosis fugax, no eye pain, no erythema eye(s), no mouth sores and no sore throat  Cardiovascular: swelling in the arms or legs, but no chest pain or pressure and no dyspnea on exertion  Respiratory: no unusual or persistent cough, no shortness of breath and no pleurisy  Gastrointestinal: heartburn, but no abdominal pain, no nausea, no vomiting, no diarrhea, no constipation, no melena and no BRBPR  Genitourinary: no foamy urine, but no dysuria and no hematuria  Musculoskeletal: as noted in HPI  Integumentary nail changes, but no rash, no Raynaud's, no alopecia and no photosensitivity  Endocrine heat or cold intolerance, but no polyuria and no polydipsia  Hematologic/Lymphatic: no unusual bleeding and no tendency for easy bruising  Neurological: tingling, but no headache, no vertigo or dizziness and no weakness  Psychiatric: insomnia and non-restorative sleep, but no depression and no anxiety  Active Problems    1  Abnormal neurological exam (781 99) (R29 90)   2  Acute hip pain (719 45) (M25 559)   3  Analgesic use (V58 69) (Z79 899)   4  Bruise (924 9) (T14 8)   5  Chronic pain syndrome (338 4) (G89 4)   6  Chronic steroid use (V58 65)   7  DDD (degenerative disc disease), lumbosacral (722 52) (M51 37)   8  Depression screen (V79 0) (Z13 89)   9  Esophageal reflux (530 81) (K21 9)   10  Fall with injury (959 9,E888 9) (W19 XXXA)   6  Hip pain, right (719 45) (M25 551)   12  Left ankle swelling (719 07) (M25 472)   13  Left leg swelling (729 81) (M79 89)   14  Leg hematoma (924 5) (S80 10XA)   15  Levoscoliosis (737 39) (M41 80)   16  Low back pain (724 2) (M54 5)   17  Lumbar post-laminectomy syndrome (722 83) (M96 1)   18  Lumbar radiculopathy (724 4) (M54 16)   19  Multiple joint pain (719 49) (M25 50)   20  Myalgia (729 1) (M79 1)   21  Neoplasm of skin (239 2) (D49 2)   22  Opioid dependence (304 00) (F11 20)   23  Other muscle spasm (728 85) (M62 838)   24  Reactive airway disease (679 90) (J45 909)   25  Restless legs syndrome (995 94) (G25 81)   26   Rib pain on right side (786 50) (R07 81)   27  Shoulder injury, left, initial encounter (959 2) (S49 92XA)   28  Sleep apnea (780 57) (G47 30)   29  Spondylolisthesis, grade 1 (738 4) (M43 10)   30  Visual disturbances (368 9) (H53 9)    Past Medical History    1  History of Acute right lumbar radiculopathy (724 4) (M54 16)   2  History of Acute upper respiratory infection (465 9) (J06 9)   3  History of Encounter for screening for malignant neoplasm of colon (V76 51) (Z12 11)   4  History of arthritis (V13 4) (Z87 39)   5  History of esophageal reflux (V12 79) (Z87 19)   6  History of rheumatoid arthritis (V13 4) (Z87 39)   7  History of Nonvenomous Insect Bite Of Right Upper Arm (912 4)   8  History of Pain in both lower extremities (729 5) (M79 604,M79 605)   9  History of Pain of left shoulder region (719 41) (M25 512)   10  History of Skin rash (782 1) (R21)   11  History of Visit For: Screening Exam Parasitic Infections (V75 8)    Surgical History    1  History of Elbow Surgery   2  History of Foot Surgery   3  History of Knee Surgery   4  History of Palatopharyngoplasty   5  History of Peripheral Nerve Block Wrist Radial Right   6  History of Sinus Surgery   7  History of Tonsillectomy With Adenoidectomy   8  History of Umbilical Hernia Repair    Family History  Mother    1  Family history of congestive heart failure (V17 49) (Z82 49)  Father    2  Family history of transient ischemic attacks (V17 1) (Z82 3)  Brother    3  Family history of gout (V18 19) (Z82 69)  Family History    4  Family history of cerebrovascular accident (V17 1) (Z82 3)   5  Denied: Family history of Crohn's disease   6  Denied: Family history of psoriasis   7  Denied: Family history of rheumatoid arthritis   8  Denied: Family history of systemic lupus erythematosus   9   Denied: Family history of ulcerative colitis    Social History    · Alcohol use (V49 89) (Z78 9)   · Being A Social Drinker   · Drinks coffee   · Never A Smoker   · No drug use    Current Meds   1  B-Complex TABS; Therapy: (Recorded:2012) to Recorded   2  Calcium 500 500 MG TABS; Therapy: (Recorded:2017) to Recorded   3  CeleBREX 200 MG Oral Capsule; Take 1 capsule twice daily; Therapy: (Recorded:2017) to Recorded   4  Collagen Hydrolysate Powder; Therapy: (Recorded:2016) to Recorded   5  Enbrel 50 MG/ML SOLN; INJECT 50MG SUBCUTANEOUSLY WEEKLY Recorded   6  Endocet 7 5-325 MG Oral Tablet; TAKE 1 TABLET EVERY 6 HOURS AS NEEDED FOR   PAIN;   Therapy: 90Wju7887 to (Evaluate:78Gsn7831); Last Rx:2017 Ordered   7  Esomeprazole Magnesium 40 MG Oral Capsule Delayed Release; TAKE 1 CAPSULE   DAILY AS NEEDED FOR GASTROESOPHAGEAL REFLUX DISEASE; Therapy: 90JOY2662 to (Last Rx:2017)  Requested for: 2017 Ordered   8  Gabapentin 600 MG Oral Tablet; TAKE 1 5 TABLET 3 times daily; Therapy: 79GIA8703 to (Evaluate:85Hlm2700)  Requested for: ; Last   Rx:2017 Ordered   9  Glucosamine TABS; Therapy: (472) to Recorded   10  Magnesium 500 MG Oral Capsule; Therapy: (QUXEUKHS:30IZL2230) to Recorded   11  Milk Thistle 175 MG Oral Capsule; Therapy: ([id number]) to Recorded   12  Orphenadrine Citrate  MG Oral Tablet Extended Release 12 Hour; TAKE 1    TABLET THREE TIMES A DAY AS NEEDED FOR MUSCLE SPASM; Therapy: 04TKI3011 to (Last Rx:2017)  Requested for: 11KUT7659 Ordered   13  Potassium Citrate Granules; Therapy: (EHEQHLVO:59MLB0724) to Recorded   14  Sleep Oral Capsule; Therapy: 38SKN1758 to Recorded   15  Turmeric Curcumin Oral Capsule; Therapy: (QLWRCZQP:00UXS0940) to Recorded   16  Vitamin B12 TABS; Therapy: (472) to Recorded   17  Zinc TABS; Therapy: (Recorded:63Tfi2870) to Recorded    Allergies    1  Penicillins   2  Morphine Derivatives   3   Sulfa Drugs    Vitals  Signs   Recorded: 2017 01:58PM   Heart Rate: 84  Systolic: 022  Diastolic: 92  Height: 5 ft 10 in  Weight: 170 lb   BMI Calculated: 24 39  BSA Calculated: 1 95    Physical Exam    Constitutional   General appearance: No acute distress, well appearing and well nourished  Eyes   Conjunctiva and lids: No swelling, erythema or discharge  Pupils and irises: Equal, round and reactive to light  Ears, Nose, Mouth, and Throat   External inspection of ears and nose: Normal     Oropharynx: Normal with no erythema, edema, exudate lesions, or ulcers  Pulmonary   Respiratory effort: No increased work of breathing or signs of respiratory distress  Auscultation of lungs: Clear to auscultation  Cardiovascular   Auscultation of heart: Normal rate and rhythm, normal S1 and S2, without murmurs  Examination of extremities for edema and/or varicosities: Abnormal   varicosities noted bilaterally  Lymphatic   Palpation of lymph nodes in neck: No lymphadenopathy  Psychiatric   Orientation to person, place, and time: Normal     Mood and affect: Normal         Right wrist tenderness and swelling  Right elbow swelling and restricted ROM  Right glenohumeral joint restricted ROM  Left wrist tenderness and swelling  Left Elbow swelling and restricted ROM  Left glenohumeral joint restricted ROM  Right hip tenderness  Left hip tenderness  Right Upper Extremity: Right Hand: Right Hand Appearance: Heberden's nodule at the all DIPs digit and + chronic RA deformities; + squaring of 1st CMC  Right Wrist: Right Elbow: Right Shoulder:   Left Upper Extremity: Left Hand: Appearance: all DIPs digit(s) Heberden's Node(s) and + chronic RA deformities; + squaring of 1st CMC  Left Wrist: Left Elbow: Left Shoulder:   Musculoskeletal - Joints, bones, and muscles: Abnormal  Appearance - scoliosis  Palpation - bilateral knee crepitus  Muscle strength/tone: Abnormal  Motor Strength Findings: right hand dominance and bilateral upper extremity weakness, but normal lower extremity strength     Right upper extremity: shoulder flexion 5/5, shoulder extension 5/5, biceps 5/5, triceps 5/5, the hand  was weak  Left upper extremity shoulder flexion 4/5, shoulder extension 4/5, biceps 5/5, triceps 5/5, the hand  was weak  Right lower extremity strength: hip flexion 5/5, hip abduction 5/5, hip adduction 5/5, knee flexion 5/5, knee extension 5/5, ankle dorsiflexion 5/5, ankle plantar flexion 5/5  Left lower extremity strength: hip flexion 5/5, hip abduction 5/5, hip adduction 5/5, knee flexion 5/5, knee extension 5/5, ankle dorsiflexion 5/5, ankle plantar flexion 5/5  Skin - Skin and subcutaneous tissue: Normal    Neurologic - Reflexes: Normal  Deep tendon reflexes: 1+ right biceps, 1+ left biceps, 1+ right triceps, 1+ left triceps, 1+ right brachioradialis, 1+ left brachioradialis, 1+ right patella, 1+ left patella, 1+ right ankle jerk and 1+ left ankle jerk  Sensation: Normal      The patient has tenderness in all MCP, PIP and DIP joints of the right hand  The patient has tenderness in all MCP, PIP and DIP joints of the left hand  The patient has swelling of all MCP and PIP joints of the right hand  The patient has swelling of all MCP and PIP joints of the left hand        Future Appointments    Date/Time Provider Specialty Site   09/27/2017 01:40 PM Fanny Licea DO Rheumatology [facility]   08/15/2017 08:00 AM MARIEL Castro Pain Management [facility] 15     Signatures   Electronically signed by : Edil Bearden DO; Aug 10 2017  4:51PM EST                       (Author)

## 2018-01-16 NOTE — PROGRESS NOTES
Assessment    1  Chronic pain syndrome (338 4) (G89 4)   2  Low back pain (724 2) (M54 5)   3  DDD (degenerative disc disease), lumbosacral (722 52) (M51 37)   4  Rheumatoid arthritis (714 0) (M06 9)    Plan  Chronic pain syndrome, DDD (degenerative disc disease), lumbosacral, Restless legs  syndrome, Rheumatoid arthritis    · Nucynta  MG Oral Tablet Extended Release 12 Hour; take 1 tablet every  twelve hours   Rx By: Kemper Mcardle; Dispense: 15 Days ; #:30 Tablet Extended Release 12 Hour; Refill: 0; For: Chronic pain syndrome, DDD (degenerative disc disease), lumbosacral, Restless legs syndrome, Rheumatoid arthritis; MAURO = N; Print Rx; Do Not Fill Before: [id number]    Discussion/Summary    The patient is a 63year old male with history of rheumatoid arthritis and GERD presenting s/p lumbar decompression and diffuse joint pain secondary to return to RA  S/p multiple surgical procedures  Patient's pain may be multifactorial  His main component of pain appears to be related to his right radiculopathy  MRI is reviewed with the patient and his wife  Discussed various treatment options  Recommended physical therapy after injection  Recommended to increase Nucynta to 200 mg ER  2 - 2 week prescriptions DNFB 1/15 and 1/29 provided  For breakthrough Endocet, 1 -2 tabs per day  No refills needed at this time  If no significant change, we will consider change to oxycontin  The risk of opioid medications, including dependence, addiction and tolerance were explained to the patient  The patient understands and agrees to use the medications only as prescribed  I have fully discussed the potential side effects of the medication with the patient, which include, but are not limited to, constipation, drowsiness, addiction, impaired judgment, and risk of fatal overdose if not taken as prescribed  I have warned the patient that sharing medications is a felony   I warned against driving while taking sedating medications  At this point in time, the patient is showing no signs of addiction, abuse, diversion or suicidal ideation  While the patient was in the office today, an opioid contract was thoroughly reviewed and signed by the patient  The patient was given adequate time to ask questions in regards to the contract and a signed copy was given to the patient for his/her records  Continue Gabapentin 300 mg 3 tabs Qhs     F/u 4 weeks  Possible side effects of new medications were reviewed with the patient/guardian today  The treatment plan was reviewed with the patient/guardian  The patient/guardian understands and agrees with the treatment plan      Chief Complaint    1  Pain    History of Present Illness  The patient is a 63year old male with history of rheumatoid arthritis and GERD presenting s/p lumbar decompression and diffuse joint pain secondary to return to RA  S/p multiple surgical procedures       Patient continues to have constant sharp throbbing and pressure-like pain  He is currently taking Nucynta 150 mg ER Q12 and Acetaminophen BID and endocet with severe pain  Denies side effects  He is also on Celebrex 200 mg twice a day prednisone 10 mg twice a day Neurontin 100 mg at bedtime, Enbrel  He has noticed relief approximately, but still has pain and may require additional adjustment  Patient is working as a hardware salesman  Referring physician is  Dr Emmett Fernando   Primary Care physician is  Dr Maria Dolores Blackwell presents with complaints of constant episodes of bilateral neck and bilateral lower back pain, described as sharp and throbbing, radiating to the left buttock and right lower extremity  On a scale of 1 to 10, the patient rates the pain as 8  Symptoms are unchanged  Review of Systems    Constitutional: no fever, no recent weight gain and no recent weight loss  Eyes: no double vision and no blurry vision     Cardiovascular: no chest pain, no palpitations and no lower extremity edema  Respiratory: no complaints of shortness of breath and no wheezing  Musculoskeletal: difficulty walking, muscle weakness, joint stiffness and decreased range of motion, but no joint swelling, no limb swelling` and no pain in extremity  Neurological: no dizziness, no difficulty swallowing, no memory loss, no loss of consciousness and no seizures  Gastrointestinal: no nausea, no vomiting, no constipation and no diarrhea  Genitourinary: no difficulty initiating urine stream, no genital pain and no frequent urination  Integumentary: no complaints of skin rash  Psychiatric: no depression  Endocrine: no excessive thirst, no adrenal disease, no hypothyroidism and no hyperthyroidism  Hematologic/Lymphatic: no tendency for easy bruising and no tendency for easy bleeding  ROS reviewed  Active Problems    1  Abnormal neurological exam (781 99) (R29 90)   2  Acute hip pain (719 45) (M25 559)   3  Acute upper respiratory infection (465 9) (J06 9)   4  Analgesic use (V58 69) (Z79 899)   5  Chronic pain syndrome (338 4) (G89 4)   6  Chronic steroid use (V58 65)   7  DDD (degenerative disc disease), lumbosacral (722 52) (M51 37)   8  Esophageal reflux (530 81) (K21 9)   9  Low back pain (724 2) (M54 5)   10  Myalgia (729 1) (M79 1)   11  Neoplasm of skin (239 2) (D49 2)   12  Other muscle spasm (728 85) (M62 838)   13  Reactive airway disease (493 90) (J45 909)   14  Restless legs syndrome (333 94) (G25 81)   15  Rheumatoid arthritis (714 0) (M06 9)   16  Skin rash (782 1) (R21)   17  Sleep apnea (780 57) (G47 30)   18  Visual disturbances (368 9) (H53 9)    Past Medical History    1  History of Acute right lumbar radiculopathy (724 4) (M54 16)   2  History of arthritis (V13 4) (Z87 39)   3  History of esophageal reflux (V12 79) (Z87 19)   4  History of Nonvenomous Insect Bite Of Right Upper Arm (912 4)   5   History of Visit For: Screening Exam Parasitic Infections (V75 8)    The active problems and past medical history were reviewed and updated today  Surgical History    1  History of Foot Surgery   2  History of Knee Surgery   3  History of Palatopharyngoplasty   4  History of Peripheral Nerve Block Wrist Radial Right   5  History of Sinus Surgery   6  History of Tonsillectomy With Adenoidectomy   7  History of Umbilical Hernia Repair    The surgical history was reviewed and updated today  Family History    1  Family history of congestive heart failure (V17 49) (Z82 49)    2  Family history of transient ischemic attacks (V17 1) (Z82 3)    3  Family history of cerebrovascular accident (V17 1) (Z82 3)    The family history was reviewed and updated today  Social History    · Alcohol use (V49 89) (F10 99)   · Being A Social Drinker   · Drinks coffee   · Never A Smoker   · No drug use  The social history was reviewed and updated today  The social history was reviewed and is unchanged  Current Meds   1  B-Complex TABS; Therapy: (Recorded:07Nov2012) to Recorded   2  Celecoxib 200 MG Oral Capsule; TAKE 1 CAPSULE DAILY AS NEEDED; Last   Rx:10Jun2015 Ordered   3  Enbrel 50 MG/ML SOLN; INJECT 50MG SUBCUTANEOUSLY WEEKLY Recorded   4  Enbrel 50 MG/ML Subcutaneous Solution Prefilled Syringe; Therapy: 06RJK5125 to Recorded   5  Endocet 5-325 MG Oral Tablet; TAKE 1 TABLET EVERY 4 HOURS AS NEEDED FOR   PAIN  MDD:5;   Therapy: 04Fjb7697 to (Evaluate:55Whx2597); Last Rx:52Law0229 Ordered   6  Endocet 5-325 MG Oral Tablet; TAKE 1 TABLET EVERY 6 HOURS AS NEEDED; Therapy: 86WWN9660 to (Evaluate:00Zcb6217); Last Rx:20Nov2015 Ordered   7  Esomeprazole Magnesium 40 MG Oral Capsule Delayed Release; TAKE 1 CAPSULE   DAILY AS NEEDED FOR GERD; Therapy: 18YME6527 to (Last [place])  Requested for: 23LCK9166 Ordered   8  Gabapentin 300 MG Oral Capsule; take 3 capsules at bedtime; Therapy: 99IRE3554 to (Last Rx:10Jun2015)  Requested for: 10Jun2015 Ordered   9  Glucosamine TABS;    Therapy: (NGMWCFDU:93NNV9237) to Recorded   10  Shira CAPS Recorded   11  Minocycline HCl - 100 MG Oral Tablet; Therapy: (044 803 99 21) to Recorded   12  Nucynta  MG Oral Tablet Extended Release 12 Hour; take 1 tablet every twelve    hours; Therapy: 06MKA9786 to (Evaluate:19Jan2016); Last Rx:73Nwb6558 Ordered   13  Orphenadrine Citrate  MG Oral Tablet Extended Release 12 Hour; TAKE 1    TABLET Every twelve hours PRN muscle spasm; Therapy: 20NGE3571 to (Evaluate:05Jul2014); Last Rx:30Vvu9390 Ordered   14  Orphenadrine Citrate  MG Oral Tablet Extended Release 12 Hour; TAKE 1    TABLET THREE TIMES A DAY AS NEEDED FOR MUSCLE SPASM; Therapy: 65JBH9312 to (Last Rx:12Jun2015)  Requested for: 12Jun2015 Ordered   15  PredniSONE 10 MG Oral Tablet; TAKE 1 TABLET DAILY; Therapy: (044 803 99 21) to Recorded   16  Sleep Oral Capsule; Therapy: 24LDR1686 to Recorded   17  TraMADol HCl - 50 MG Oral Tablet; Take 1 to 2 tablets PO up to 3 times a day as    needed for pain; Therapy: 31WRI1726 to (Evaluate:02Jan2016); Last Rx:14Bui2898 Ordered   18  Vitamin B12 TABS; Therapy: (044 803 99 21) to Recorded    The medication list was reviewed and updated today  Allergies    1  Morphine Derivatives   2  Penicillins   3  Sulfa Drugs    Vitals  Vital Signs [Data Includes: Current Encounter]    Recorded: 53GQM3900 12:59PM   Temperature 99 F, Oral   Heart Rate 98   Systolic 378   Diastolic 92   Height 5 ft 10 in   Weight 175 lb    BMI Calculated 25 11   BSA Calculated 1 98   Pain Scale 8     Physical Exam    Constitutional   General appearance: Well developed, well nourished, alert, in no distress, non-toxic and no overt pain behavior  Eyes   Sclera: anicteric   HEENT   Hearing grossly intact  Neck   Neck: Supple, symmetric, trachea midline, no masses  Pulmonary   Respiratory effort: Even and unlabored        Cardiovascular   Examination of extremities: No edema or pitting edema present  Skin   Skin and subcutaneous tissue: Abnormal   Bilateral lower extremity dispersions noted  Psychiatric   Mood and affect: Mood and affect appropriate  Neurologic   Cranial nerves: Cranial nerves II-XII grossly intact  Musculoskeletal   Gait and station: Normal        Results/Data  Encounter Results   Procedure Flowsheet [id number] 01:33PM      Test Name Result Flag Reference   Opioid Contract Renewed [id number]       Results Free Text Form [facility]:   Results     MRI:  MRI Lumbar spine dated 5/25/2015  FINDINGS-     ALIGNMENT- Very mild localized levoscoliosis of the lumbar spine at   the L2-3 level  No compression fracture spondylolisthesis or   spondylolysis  MARROW SIGNAL- Endplate marrow degenerative change, Modic type II at   the L1 to level  Small hemangioma within the L3 vertebral body  No   acute marrow edema  DISTAL CORD AND CONUS- Normal size and signal within the distal cord   and conus  The conus ends at the L1 level  PARASPINAL SOFT TISSUES- Paraspinal soft tissues are unremarkable  SACRUM- Normal signal within the sacrum  No evidence of insufficiency   or stress fracture  LOWER THORACIC DISC SPACES- T11-12 and T12-L1 demonstrate mild   degenerative disc disease  At T12-L1 there is disc desiccation and   loss of disc height with inferiorly extruded central disc herniation   resulting in mild canal stenosis  No foraminal narrowing  LUMBAR DISC SPACES-     L1-L2- Disc desiccation and loss of disc height  Mild annular bulging   and endplate osteophyte formation  Mild canal stenosis  No   significant foraminal narrowing  L2-L3- Mild diffuse annular bulging  Mild endplate osteophyte   formation  No disc herniation  Mild canal stenosis  Minimal   foraminal narrowing without nerve impingement  L3-L4- Disc desiccation and loss of disc height with moderate diffuse   annular bulging   There is a superiorly extruded right paracentral disc herniation best seen on series 5 images 11 and 12, as well as series 6,   images 14 through 17  There is moderate disc extrusion is located in   the right paracentral epidural space and extending into the proximal   aspect of the neural foramen  There is mild facet arthropathy  Combination of findings result in moderate canal stenosis with   distortion of the right anterolateral aspect of the thecal sac  Mild   bilateral foraminal narrowing     L4-L5- Moderate diffuse annular bulging  Small broad-based central   disc protrusion  Mild facet arthropathy with small facet joint   effusions  Mild to moderate canal stenosis and foraminal narrowing  L5-S1- Diffuse annular bulging, mild  Moderate facet hypertrophic   degenerative change  No significant canal stenosis  Minimal foraminal   narrowing  Incidentally noted conjoined right L5 and S1 root sleeves  IMPRESSION-     Mild localized levoscoliosis of the lumbar spine  Multilevel lumbar   spondylitic degenerative change  Central disc herniation with inferior extrusion at T12-L1 resulting in   mild canal stenosis  Moderate-sized right paracentral disc herniation which is superiorly   extruded behind the L3 vertebral body distorting the anterolateral   aspect of the thecal sac and having mild mass effect upon the right L3   nerve as it extends from the thecal sac into the neural foramen  Correlate for right L3 radiculopathy  L4-5 degenerative disc disease with mild to moderate canal stenosis and   foraminal narrowing  Spine surgery consultation recommended            Future Appointments    Date/Time Provider Specialty Site   02/12/2016 01:15 PM Eusebio Langley MD Pain Management [address]     Signatures   Electronically signed by : Lara Gloria MD; Lenny 15 2016  4:21PM EST                       (Author)

## 2018-01-16 NOTE — RESULT NOTES
Message   Recorded as Task   Date: 07/14/2017 08:18 AM, Created By: Manas Mota   Task Name: Follow Up   Assigned To: [address] Procedure,Team   Regarding Patient: Lizet Roca, Status: Active   Comment:    Anahy Costa - 14 Jul 2017 8:18 AM     TASK CREATED  Patient report he received 30% from his procedure  His pain level is a 7  Patient aware of the 2wk wait  F/u call next wk  S/p CAUDAL LISBETH done 7/7/17  Kristie Ocampo - 14 Jul 2017 10:38 AM     TASK REPLIED TO: Previously Assigned To [address] Procedure,Team  Aware  Thanks          Signatures   Electronically signed by : Yovanny Durbin, ; Jul 17 2017  9:19AM EST                       (Author)

## 2018-01-17 NOTE — MISCELLANEOUS
Message   Recorded as Task   Date: 03/28/2017 11:35 AM, Created By: Marck Redding   Task Name: Miscellaneous   Assigned To: [facility]   Regarding Patient: Drake Plata, Status: Active   CommentAngelina Mayer - 28 Mar 2017 11:35 AM     TASK CREATED  PT called about his oxycodone  Please call back at [phone]  Enedina Antonioi - 28 Mar 2017 1:02 PM     TASK EDITED  S/w pt  Pt requesting refill on:       Endocet 7 5/325mg- takes 1 tablet q 6 hrs prn, pt states has 10 pills left  Pt stated his SOVS was cancelled on 3/21/17 with A O  that she had the flu  Next SOVS is 4/21/17  Pt stated he can pickup the script Thursday or Friday  Shamika Marti - 14 Mar 2017 3:54 PM     TASK REPLIED TO: Previously Assigned To Shamika Marti  will be available in [place] for pickup  Only enough to get him to his next appt  Konrad Marion - 28 Mar 2017 4:02 PM     TASK EDITED  S/w pt  Advised pt of the same  Active Problems    1  Abnormal neurological exam (781 99) (R29 90)   2  Acute hip pain (719 45) (M25 559)   3  Acute upper respiratory infection (465 9) (J06 9)   4  Analgesic use (V58 69) (Z79 899)   5  Bruise (924 9) (T14 8)   6  Chronic pain syndrome (338 4) (G89 4)   7  Chronic steroid use (V58 65)   8  DDD (degenerative disc disease), lumbosacral (722 52) (M51 37)   9  Depression screen (V79 0) (Z13 89)   10  Encounter for screening for malignant neoplasm of colon (V76 51) (Z12 11)   11  Esophageal reflux (530 81) (K21 9)   12  Fall with injury (959 9,E888 9) (W19 XXXA)   15  Hip pain, right (719 45) (M25 551)   14  Left ankle swelling (719 07) (M25 472)   15  Left leg swelling (729 81) (M79 89)   16  Leg hematoma (924 5) (S80 10XA)   17  Levoscoliosis (737 39) (M41 80)   18  Low back pain (724 2) (M54 5)   19  Lumbar post-laminectomy syndrome (722 83) (M96 1)   20  Multiple joint pain (719 49) (M25 50)   21  Myalgia (729 1) (M79 1)   22  Neoplasm of skin (239 2) (D49 2)   23   Opioid dependence (304 00) (F11 20)   24  Other muscle spasm (728 85) (M62 838)   25  Pain in both lower extremities (729 5) (M79 604,M79 605)   26  Reactive airway disease (493 90) (J45 909)   27  Restless legs syndrome (333 94) (G25 81)   28  Rheumatoid arthritis (714 0) (M06 9)   29  Skin rash (782 1) (R21)   30  Sleep apnea (780 57) (G47 30)   31  Spondylolisthesis, grade 1 (738 4) (M43 10)   32  Visual disturbances (368 9) (H53 9)    Current Meds   1  B-Complex TABS; Therapy: (Recorded:07Nov2012) to Recorded   2  Celecoxib 200 MG Oral Capsule (CeleBREX); TAKE 1 CAPSULE DAILY AS NEEDED;   Last Rx:10Jun2015 Ordered   3  Collagen Hydrolysate Powder; Therapy: (Recorded:51Kuk7600) to Recorded   4  Enbrel 50 MG/ML SOLN; INJECT 50MG SUBCUTANEOUSLY WEEKLY Recorded   5  Endocet 7 5-325 MG Oral Tablet; TAKE 1 TABLET EVERY 6 HOURS AS NEEDED FOR   PAIN;   Therapy: 13Now9157 to (Evaluate:20Apr2017); Last Rx:28Mar2017 Ordered   6  Esomeprazole Magnesium 40 MG Oral Capsule Delayed Release (NexIUM); TAKE 1   CAPSULE DAILY AS NEEDED FOR GASTROESOPHAGEAL REFLUX DISEASE; Therapy: 31SPO5737 to (Last Rx:22Feb2017)  Requested for: 22Feb2017 Ordered   7  Gabapentin 300 MG Oral Capsule; TAKE 2 CAPSULES 3 TIMES DAILY; Therapy: 01JPA8816 to (Evaluate:25Mar2017)  Requested for: 61YZX4025; Last   Rx:24Jan2017 Ordered   8  Gabapentin 600 MG Oral Tablet; TAKE 1 TABLET 3 times daily; Therapy: 59GNG5172 to ([phone])  Requested for: 26Jan2017; Last   Rx:26Jan2017 Ordered   9  Gabapentin 600 MG Oral Tablet; Therapy: (Recorded:24Jan2017) to Recorded   10  Glucosamine TABS; Therapy: (450 45 295) to Recorded   11  Orphenadrine Citrate  MG Oral Tablet Extended Release 12 Hour; TAKE 1    TABLET THREE TIMES A DAY AS NEEDED FOR MUSCLE SPASM; Therapy: 11HSQ4476 to (Evaluate:59Spj8671)  Requested for: 99ACW3793; Last    Rx:75Kam8944 Ordered   12  Sleep Oral Capsule; Therapy: 19TGN1768 to Recorded   13   Vitamin B12 TABS;    Therapy: (Recorded:07Nov2012) to Recorded    Allergies    1  Morphine Derivatives   2  Penicillins   3   Sulfa Drugs    Signatures   Electronically signed by : Yolanda Mcknight, ; Mar 28 2017  4:02PM EST                       (Author)

## 2018-01-17 NOTE — MISCELLANEOUS
Message   Recorded as Task   Date: 10/09/2017 11:45 AM, Created By: Kinjal Chandra   Task Name: Miscellaneous   Assigned To: [facility]   Regarding Patient: Mirian Sheth, Status: In Progress   Comment:    Lucero Madden - 09 Oct 2017 11:45 AM     TASK CREATED  Pt's wife calling  Pt has an appt scheduled with Dr Stephanie George tomorrow 10/10 at 10:15 for Caudal LISBETH  He also has an appointment on Monday 10/16 for an infusion of Tocilidumad (Actemra) at the cancer center  He is concerned whether this will be a conflict and if he should keep the appt tomorrow  Pt can be reached at [phone]  He may not be able to answer right away, so please leave a message or call back in a few minutes  Fallon Rodriguez - 09 Oct 2017 12:02 PM     TASK EDITED  Please advise  Thanks  [address] - 09 Oct 2017 12:47 PM     TASK REPLIED TO: Previously Assigned To [address]  I don't have a problem with it  I would have him check with his infusion physician as well to see if the caudal LISBETH would interfere  Camillia Eym - 09 Oct 2017 3:54 PM     TASK IN PROGRESS   Karoline Aviles - 09 Oct 2017 3:57 PM     TASK EDITED  S/w pt and advised of the same  Pt states he did speak with infusion physician who stated no interaction  Pt states will be in for injection as scheduled  Active Problems    1  (QFT) QuantiFERON-TB test reaction without active tuberculosis (795 52) (R76 12)   2  Abnormal neurological exam (781 99) (R29 90)   3  Acute hip pain (719 45) (M25 559)   4  Analgesic use (V58 69) (Z79 899)   5  Bruise (924 9) (T14 8XXA)   6  Chronic pain syndrome (338 4) (G89 4)   7  Chronic steroid use (V58 65)   8  DDD (degenerative disc disease), lumbosacral (722 52) (M51 37)   9  Depression screen (V79 0) (Z13 89)   10  Esophageal reflux (530 81) (K21 9)   11  Fall with injury (959 9,E888 9) (W19 XXXA)   12  Generalized osteoarthritis (715 00) (M15 9)   13  Hip pain, right (719 45) (L67 420)   14   Insomnia (692 24) (G47 00)   15  Latent tuberculosis (795 51) (R76 11)   16  Left ankle swelling (719 07) (M25 472)   17  Left leg swelling (729 81) (M79 89)   18  Leg hematoma (924 5) (S80 10XA)   19  Levoscoliosis (737 39) (M41 80)   20  Low back pain (724 2) (M54 5)   21  Lumbar post-laminectomy syndrome (722 83) (M96 1)   22  Lumbar radiculopathy (724 4) (M54 16)   23  Multiple joint pain (719 49) (M25 50)   24  Myalgia (729 1) (M79 1)   25  Neoplasm of skin (239 2) (D49 2)   26  On etanercept therapy (V58 69) (Z79 899)   27  Opioid dependence (304 00) (F11 20)   28  Other muscle spasm (728 85) (M62 838)   29  Reactive airway disease (493 90) (J45 909)   30  Restless legs syndrome (333 94) (G25 81)   31  Rheumatoid arthritis involving multiple sites with positive rheumatoid factor (714 0)    (M05 79)   32  Rib pain on right side (786 50) (R07 81)   33  Shoulder injury, left, initial encounter (959 2) (S49 92XA)   34  Sleep apnea (780 57) (G47 30)   35  Spondylolisthesis, grade 1 (738 4) (M43 10)   36  Therapeutic drug monitoring (V58 83) (Z51 81)   37  Visual disturbances (368 9) (H53 9)    Current Meds   1  Allergy TABS; Therapy: (Recorded:79Sfw9625) to Recorded   2  CeleBREX 200 MG Oral Capsule (Celecoxib); Take 1 capsule twice daily; Therapy: (Recorded:10Aug2017) to Recorded   3  Endocet 7 5-325 MG Oral Tablet; TAKE 1 TABLET EVERY 6 HOURS AS NEEDED FOR   PAIN;   Therapy: 91Srf7681 to (Evaluate:66Iks1817); Last Rx:05Oct2017 Ordered   4  Esomeprazole Magnesium 40 MG Oral Capsule Delayed Release (NexIUM); TAKE 1   CAPSULE DAILY AS NEEDED FOR GASTROESOPHAGEAL REFLUX DISEASE; Therapy: 65AQA5941 to (Last Rx:92Kyl6189)  Requested for: 21Aug2017 Ordered   5  Gabapentin 300 MG Oral Capsule; Two tabs in a m  4 in PM;   Therapy: (Recorded:81Gxx5136) to Recorded   6  Orphenadrine Citrate  MG Oral Tablet Extended Release 12 Hour; TAKE 1   TABLET THREE TIMES A DAY AS NEEDED FOR MUSCLE SPASM;    Therapy: 76TOU6029 to (Last Rx: 96XRA8708)  Requested for: 22Yzx9890 Ordered   7  PredniSONE 5 MG Oral Tablet; take 2 tablet daily; Therapy: (Recorded:85Ory2779) to Recorded   8  Zolpidem Tartrate 10 MG Oral Tablet; TAKE 1 TABLET AT BEDTIME AS NEEDED FOR   SLEEP; Therapy: 98KDL6292 to [phone]); Last Rx:81Xmp8293 Ordered    Allergies    1  Penicillins   2  Morphine Derivatives   3   Sulfa Drugs    Signatures   Electronically signed by : Saima Fairbanks, ; Oct  9 2017  3:57PM EST                       (Author)

## 2018-01-18 NOTE — PROGRESS NOTES
History of Present Illness  Care Coordination Encounter Information:   Type of Encounter: Telephonic   Contact: Follow-Up    Spoke to Patient  Care Coordination SL Nurse [employer] Counts include 234 beds at the [facility]:   The reason for call is to discuss outreach for follow up/needed services  Outreach TC to patient for care   patient reports he is seeing Dr Ren Carrington, [facility] provider, for rheumatology  Patient is getting Actemra weekly and Rituxan monthly  Patient reports that his pain is much more controlled  Patient is still using Endocet 7 5mg/325mg 4x day and Celebrex 2x day and Gabapentin   Patient also uses Ambien to help sleep at night  Patient is requesting a refill on his Ambien  Patient agrees to continued calls  Updated patient on contact information for CM  Educated patient on use of acetaminophen and maximum dose with understanding verbalized  Active Problems    1  (QFT) QuantiFERON-TB test reaction without active tuberculosis (795 52) (R76 12)   2  Abnormal neurological exam (781 99) (R29 90)   3  Acute hip pain (719 45) (M25 559)   4  Analgesic use (V58 69) (Z79 899)   5  Bruise (924 9) (T14 8XXA)   6  Chronic pain syndrome (338 4) (G89 4)   7  Chronic steroid use (V58 65)   8  DDD (degenerative disc disease), lumbosacral (722 52) (M51 37)   9  Depression screen (V79 0) (Z13 89)   10  Esophageal reflux (530 81) (K21 9)   11  Fall with injury (959 9,E888 9) (W19 XXXA)   12  Generalized osteoarthritis (715 00) (M15 9)   13  Hip pain, right (719 45) (M25 551)   14  Insomnia (780 52) (G47 00)   15  Latent tuberculosis (795 51) (R76 11)   16  Left ankle swelling (719 07) (M25 472)   17  Left leg swelling (729 81) (M79 89)   18  Leg hematoma (924 5) (S80 10XA)   19  Levoscoliosis (737 39) (M41 80)   20  Low back pain (724 2) (M54 5)   21  Lumbar post-laminectomy syndrome (722 83) (M96 1)   22  Lumbar radiculopathy (724 4) (M54 16)   23  Multiple joint pain (719 49) (M25 50)   24  Myalgia (729 1) (M79 1)   25  Neoplasm of skin (239 2) (D49 2)   26  On etanercept therapy (V58 69) (Z79 899)   27  Opioid dependence (304 00) (F11 20)   28  Other muscle spasm (728 85) (M62 838)   29  Reactive airway disease (493 90) (J45 909)   30  Restless legs syndrome (333 94) (G25 81)   31  Rheumatoid arthritis involving multiple sites with positive rheumatoid factor (714 0)    (M05 79)   32  Rib pain on right side (786 50) (R07 81)   33  Shoulder injury, left, initial encounter (959 2) (S49 92XA)   34  Sleep apnea (780 57) (G47 30)   35  Spondylolisthesis, grade 1 (738 4) (M43 10)   36  Therapeutic drug monitoring (V58 83) (Z51 81)   37  Visual disturbances (368 9) (H53 9)    Past Medical History    1  History of Acute right lumbar radiculopathy (724 4) (M54 16)   2  History of Acute upper respiratory infection (465 9) (J06 9)   3  History of Encounter for screening for malignant neoplasm of colon (V76 51) (Z12 11)   4  History of arthritis (V13 4) (Z87 39)   5  History of esophageal reflux (V12 79) (Z87 19)   6  History of rheumatoid arthritis (V13 4) (Z87 39)   7  History of Nonvenomous Insect Bite Of Right Upper Arm (912 4)   8  History of Pain in both lower extremities (729 5) (M79 604,M79 605)   9  History of Pain of left shoulder region (719 41) (M25 512)   10  History of Skin rash (782 1) (R21)   11  History of Visit For: Screening Exam Parasitic Infections (V75 8)    Surgical History    1  History of Elbow Surgery   2  History of Foot Surgery   3  History of Knee Surgery   4  History of Palatopharyngoplasty   5  History of Peripheral Nerve Block Wrist Radial Right   6  History of Sinus Surgery   7  History of Tonsillectomy With Adenoidectomy   8  History of Umbilical Hernia Repair    Family History  Mother    1  Family history of congestive heart failure (V17 49) (Z82 49)  Father    2  Family history of transient ischemic attacks (V17 1) (Z82 3)  Brother    3  Family history of gout (V18 19) (Z82 69)  Family History    4   Family history of cerebrovascular accident (V17 1) (Z82 3)   5  Denied: Family history of Crohn's disease   6  Denied: Family history of psoriasis   7  Denied: Family history of rheumatoid arthritis   8  Denied: Family history of systemic lupus erythematosus   9  Denied: Family history of ulcerative colitis    Social History    · Alcohol use (V49 89) (Z78 9)   · Being A Social Drinker   · Drinks coffee   · Never A Smoker   · No drug use    Current Meds    1  Gabapentin 300 MG Oral Capsule; Two tabs in a m  4 in PM;   Therapy: (Recorded:31Fln1704) to Recorded    2  Endocet 7 5-325 MG Oral Tablet; TAKE 1 TABLET EVERY 6 HOURS AS NEEDED FOR   PAIN;   Therapy: 08Kes6108 to (Evaluate:25Kgr6688); Last Rx:05Oct2017 Ordered    3  Esomeprazole Magnesium 40 MG Oral Capsule Delayed Release (NexIUM); TAKE 1   CAPSULE DAILY AS NEEDED FOR GASTROESOPHAGEAL REFLUX DISEASE; Therapy: 68ZUZ8750 to (Last Rx:20Nov2017)  Requested for: 20Nov2017 Ordered    4  Zolpidem Tartrate 10 MG Oral Tablet; TAKE 1 TABLET AT BEDTIME AS NEEDED FOR   SLEEP; Therapy: 54JFM2810 to (Evaluate:26Nov2017); Last Rx:89Svr8656 Ordered    5  Orphenadrine Citrate  MG Oral Tablet Extended Release 12 Hour; TAKE 1   TABLET THREE TIMES A DAY AS NEEDED FOR MUSCLE SPASM; Therapy: 41QDS5026 to (Last Rx:16Nov2017)  Requested for: 08DXK2390 Ordered    6  PredniSONE 5 MG Oral Tablet; take 2 tablet daily; Therapy: (Recorded:77Ymy0251) to Recorded    7  CeleBREX 200 MG Oral Capsule (Celecoxib); Take 1 capsule twice daily; Therapy: (Recorded:10Aug2017) to Recorded    8  Allergy TABS; Therapy: (Recorded:13Sep2017) to Recorded    Allergies    1  Penicillins   2  Morphine Derivatives   3  Sulfa Drugs    End of Encounter Meds    1  Gabapentin 300 MG Oral Capsule; Two tabs in a m  4 in PM;   Therapy: (Recorded:13Sep2017) to Recorded    2  Endocet 7 5-325 MG Oral Tablet; TAKE 1 TABLET EVERY 6 HOURS AS NEEDED FOR   PAIN;   Therapy: 11Nxp7718 to (Evaluate:92Lmo5318);  Last Rx: 30UDI3939 Ordered    3  Esomeprazole Magnesium 40 MG Oral Capsule Delayed Release (NexIUM); TAKE 1   CAPSULE DAILY AS NEEDED FOR GASTROESOPHAGEAL REFLUX DISEASE; Therapy: 38IIE7932 to (Last Rx:20Nov2017)  Requested for: 20Nov2017 Ordered    4  Zolpidem Tartrate 10 MG Oral Tablet; TAKE 1 TABLET AT BEDTIME AS NEEDED FOR   SLEEP; Therapy: 38GOB7654 to (Evaluate:26Nov2017); Last Rx:27Oct2017 Ordered    5  Orphenadrine Citrate  MG Oral Tablet Extended Release 12 Hour; TAKE 1   TABLET THREE TIMES A DAY AS NEEDED FOR MUSCLE SPASM; Therapy: 70KHV0039 to (Last Rx:16Nov2017)  Requested for: 45NGN3040 Ordered    6  PredniSONE 5 MG Oral Tablet; take 2 tablet daily; Therapy: (Recorded:59Ijz6467) to Recorded    7  CeleBREX 200 MG Oral Capsule (Celecoxib); Take 1 capsule twice daily; Therapy: (Recorded:10Aug2017) to Recorded    8  Allergy TABS;    Therapy: (Recorded:45Rof5504) to Recorded    Patient Care Team    Care Team Member Role Specialty Office Number   Karyngyann Hodgkins D MD  Rheumatology [phone]   German Jackson DO Specialist Physiatry [phone]   Earnestine Webster DO Attending Family Medicine [phone]   Kelsie Lang  Nurse Practitioner [phone]   [address] MD  Pain Management [phone]   Naresh FLOYD  Pain Management [phone]     Signatures   Electronically signed by : Marcello Hastings RN; Nov 30 2017 12:22PM EST                       (Author)

## 2018-01-18 NOTE — CONSULTS
Chief Complaint  Chief Complaint Free Text Note Form: Coming in for positive QuantiFERON gold      History of Present Illness  HPI: This is a 56-year-old male with a history of rheumatoid arthritis initially diagnosed in 45  He was initially treated with gold and methotrexate and was then placed on Enbrel approximately 15-20 years ago  Unfortunately he has had disease progression  He was recently seen by Dr France Luther and it was recommended to start on echo September are  As part of screening for this he had a quantiferon gold which came back positive  He is referred to our office for further evaluation  Patient states he believes he had a chronic furuncles in 2016 which is negative  All prior PPD skin testing over the years have also been negative  He denies any known TB contacts  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  He did have a trip to [facility] in the summer of 2012  He is never been in the [employer], gel, or a homeless shelter  He currently feels well without any cough, shortness of breath, night sweats, or weight loss  His biggest concern is worsening rheumatoid arthritis with some tendon rupture  Review of Systems  Complete-Male:   Constitutional: No fever or chills, feels well, no tiredness, no recent weight gain or weight loss  Eyes: No complaints of eye pain, no red eyes, no discharge from eyes, no itchy eyes  ENT: no complaints of earache, no hearing loss, no nosebleeds, no nasal discharge, no sore throat, no hoarseness  Cardiovascular: No complaints of slow heart rate, no fast heart rate, no chest pain, no palpitations, no leg claudication, no lower extremity  Respiratory: No complaints of shortness of breath, no wheezing, no cough, no SOB on exertion, no orthopnea or PND  Gastrointestinal: No complaints of abdominal pain, no constipation, no nausea or vomiting, no diarrhea or bloody stools     Genitourinary: No complaints of dysuria, no incontinence, no hesitancy, no nocturia, no genital lesion, no testicular pain  Musculoskeletal: as noted in HPI  Integumentary: No complaints of skin rash or skin lesions, no itching, no skin wound, no dry skin  Neurological: No compliants of headache, no confusion, no convulsions, no numbness or tingling, no dizziness or fainting, no limb weakness, no difficulty walking  Psychiatric: Is not suicidal, no sleep disturbances, no anxiety or depression, no change in personality, no emotional problems  Endocrine: No complaints of proptosis, no hot flashes, no muscle weakness, no erectile dysfunction, no deepening of the voice, no feelings of weakness  Hematologic/Lymphatic: No complaints of swollen glands, no swollen glands in the neck, does not bleed easily, no easy bruising  ROS Reviewed:   ROS reviewed  Past Medical History  Active Problems And Past Medical History Reviewed: The active problems and past medical history were reviewed and updated today  Surgical History  Surgical History Reviewed: The surgical history was reviewed and updated today  Family History  Family History Reviewed: The family history was reviewed and updated today  Social History  Social History Reviewed: The social history was reviewed and updated today  The social history was reviewed and is unchanged  Current Meds   1  Allergy TABS; Therapy: (Recorded:13Sep2017) to Recorded   2  CeleBREX 200 MG Oral Capsule; Take 1 capsule twice daily; Therapy: (Recorded:10Aug2017) to Recorded   3  Endocet 7 5-325 MG Oral Tablet; TAKE 1 TABLET EVERY 6 HOURS AS NEEDED FOR   PAIN;   Therapy: 95Oeg4685 to (Evaluate:14Oct2017); Last Rx:15Aug2017 Ordered   4  Esomeprazole Magnesium 40 MG Oral Capsule Delayed Release; TAKE 1 CAPSULE   DAILY AS NEEDED FOR GASTROESOPHAGEAL REFLUX DISEASE; Therapy: 58TMU3116 to (Last Rx:21Aug2017)  Requested for: 21Aug2017 Ordered   5  Gabapentin 300 MG Oral Capsule;  Two tabs in a m  4 in PM;   Therapy: (Recorded:13Sep2017) to Recorded   6  Orphenadrine Citrate  MG Oral Tablet Extended Release 12 Hour; TAKE 1   TABLET THREE TIMES A DAY AS NEEDED FOR MUSCLE SPASM; Therapy: 50NSW9447 to (Last Rx:59Uph1864)  Requested for: 36Adi5932 Ordered   7  PredniSONE 5 MG Oral Tablet; take 2 tablet daily; Therapy: (Recorded:62Pdb4705) to Recorded    Allergies    1  Penicillins   2  Morphine Derivatives   3  Sulfa Drugs    Physical Exam    Constitutional   General appearance: No acute distress, well appearing and well nourished  Eyes   Conjunctiva and lids: No swelling, erythema, or discharge  Ears, Nose, Mouth, and Throat   External inspection of ears and nose: Normal     Oropharynx: Normal with no erythema, edema, exudate or lesions  Absent uvula  Pulmonary   Respiratory effort: No increased work of breathing or signs of respiratory distress  Auscultation of lungs: Clear to auscultation, equal breath sounds bilaterally, no wheezes, no rales, no rhonci  Cardiovascular   Auscultation of heart: Normal rate and rhythm, normal S1 and S2, without murmurs  Examination of extremities for edema and/or varicosities: Normal     Abdomen   Abdomen: Non-tender, no masses  Liver and spleen: No hepatomegaly or splenomegaly  Lymphatic   Palpation of lymph nodes in neck: No lymphadenopathy  Musculoskeletal   Gait and station: Normal     Digits and nails: Normal without clubbing or cyanosis  Inspection/palpation of joints, bones, and muscles: Abnormal   Multiple rheumatoid deformities of the hands bilaterally  Skin   Skin and subcutaneous tissue: Normal without rashes or lesions  Psychiatric   Orientation to person, place and time: Normal     Mood and affect: Normal          Results/Data  * XR CHEST PA & LATERAL 35Avt1448 02:34PM Vickey Fang Order Number: [id number]   Performing Comments: Patient had a positive tb test   Please evaluate       Test Name Result Flag Reference   XR CHEST PA & LATERAL (Report)     CHEST - DUAL ENERGY     INDICATION: Occasional shortness of breath, history of rheumatoid arthritis  COMPARISON: 6/9/2017, 6/3/2015     VIEWS: PA (including soft tissue/bone algorithms) and lateral projections     IMAGES: 4     FINDINGS:        Cardiomediastinal silhouette appears unremarkable  The lungs are clear  No pneumothorax or pleural effusion  Degenerative changes of the spine  Lumbar levoscoliosis  Probable healing fractures of the right anterior 4th, 5th and 6th ribs  IMPRESSION:     No active pulmonary disease  Workstation performed: [id number]     Signed by:   Jay Jay Richter DO   8/22/17     Assessment    1  Latent tuberculosis (795 51) (R76 11)   2  (QFT) QuantiFERON-TB test reaction without active tuberculosis (795 52) (R76 12)   3  Rheumatoid arthritis involving multiple sites with positive rheumatoid factor (714 0)   (M05 79)    Plan    1  (1) QUANTIFERON - TB GOLD; Status:Active; Requested for:37Oml7746;     Discussion/Summary  Discussion Summary:   1  Positive QuantiFERON Gold  Previously reports negative tests  Consider latent TB although no obvious risk factorsA recent exposures  Consider false positive test  No active symptoms of tuberculosis and recent CXR normal  Rec:  -Repeat QuantiFERON Gold  -If repeat test positive will treat with INH/pyridoxine for 9 months  -Would need monthly CBC with differential and CMP  -No clear guidelines in terms of initiating biological therapy in the setting of latent TB  Given the patient's severe progressive symptoms, would be stable from an ID standpoint for concurrent administration of INH and biological therapy  The only potential difficulty will be if any adverse effects or lab abnormalities occur it will be difficult to distinguish the causative etiology  2  Active RA with multiple joint involvement    Follow-up plan will be determined by the results of repeat testing    Addendum on 9/20/17  Repeat Quantiferon Gold was negative   I think his previous positive was a false positive  In the setting of this negative test, prior negative on 2016, and no interval exposures, I do not think he has latent TB  No anti-TB medication needed  He is OK from ID perspective for biological therapy of his RA  Counseling Documentation With Imm: The patient was counseled regarding diagnostic results, instructions for management, risk factor reductions, prognosis, patient and family education, impressions  Future Appointments    Date/Time Provider Specialty Site   10/05/2017 03:00 PM MARIEL Wynn Pain Management [facility]   09/27/2017 01:40 PM Michelle Wiggins DO Rheumatology [facility]   10/10/2017 10:15 AM Via ROGELIO June   Pain Management [address]     Signatures   Electronically signed by : ROGELIO Gates ; Sep 13 2017 10:49AM EST                       (Author)    Electronically signed by : ROGELIO Gates ; Sep 20 2017 10:07AM EST                       (Author)

## 2018-01-22 VITALS
HEIGHT: 70 IN | DIASTOLIC BLOOD PRESSURE: 89 MMHG | BODY MASS INDEX: 24.66 KG/M2 | HEART RATE: 96 BPM | WEIGHT: 172.25 LBS | SYSTOLIC BLOOD PRESSURE: 139 MMHG | TEMPERATURE: 98.3 F

## 2018-01-22 VITALS
WEIGHT: 170 LBS | BODY MASS INDEX: 24.34 KG/M2 | DIASTOLIC BLOOD PRESSURE: 92 MMHG | HEART RATE: 84 BPM | HEIGHT: 70 IN | SYSTOLIC BLOOD PRESSURE: 144 MMHG

## 2018-02-17 DIAGNOSIS — K21.9 GASTROESOPHAGEAL REFLUX DISEASE WITHOUT ESOPHAGITIS: Primary | ICD-10-CM

## 2018-02-18 RX ORDER — ESOMEPRAZOLE MAGNESIUM 40 MG/1
CAPSULE, DELAYED RELEASE ORAL
Qty: 90 CAPSULE | Refills: 0 | Status: SHIPPED | OUTPATIENT
Start: 2018-02-18 | End: 2018-05-19 | Stop reason: SDUPTHER

## 2018-03-01 DIAGNOSIS — G47.00 INSOMNIA, UNSPECIFIED TYPE: Primary | ICD-10-CM

## 2018-03-01 RX ORDER — ZOLPIDEM TARTRATE 10 MG/1
10 TABLET ORAL
Qty: 30 TABLET | Refills: 0 | OUTPATIENT
Start: 2018-03-01 | End: 2018-03-31 | Stop reason: SDUPTHER

## 2018-03-01 RX ORDER — ZOLPIDEM TARTRATE 10 MG/1
1 TABLET ORAL
COMMUNITY
Start: 2017-09-29 | End: 2018-03-01 | Stop reason: SDUPTHER

## 2018-03-07 DIAGNOSIS — M62.838 MUSCLE SPASM: Primary | ICD-10-CM

## 2018-03-07 RX ORDER — ORPHENADRINE CITRATE 100 MG/1
TABLET, EXTENDED RELEASE ORAL
Qty: 270 TABLET | Refills: 0 | Status: SHIPPED | OUTPATIENT
Start: 2018-03-07 | End: 2018-06-20 | Stop reason: SDUPTHER

## 2018-03-07 NOTE — PROCEDURES
Procedure    ATTENDING PHYSICIAN: Aida Khan MD     PROCEDURE: Caudal epidural steroid injection under fluoroscopic guidance  PREPROCEDURE DIAGNOSIS: Lumbar post-laminectomy syndrome  POSTPROCEDURE DIAGNOSIS: Lumbar post-laminectomy syndrome  ANESTHESIA: Local    ESTIMATED BLOOD LOSS: Minimal    COMPLICATIONS: None    [facility]  CONSENT: Today's procedure, its potential benefits as well as its risks and potential side effects were reviewed  Discussed risks of the procedure including bleeding, infection, nerve irritation or damage, reactions to the medications, headache, failure of the pain to improve, and potential worsening of the pain were explained in detail to the patient who verbalized understanding and who wished to proceed  Written informed consent was thereby obtained  DESCRIPTION OF THE PROCEDURE: After written informed consent was obtained, the patient was taken to the fluoroscopy suite and placed in the prone position  Anatomical landmarks were identified by palpation to identify the sacral hiatus and by way of fluoroscopy in the PA and lateral views  The skin overlying the sacral hiatus region was prepped using antiseptic applied x3 and draped in the usual sterile fashion  Strict aseptic technique was utilized  The skin and subcutaneous tissues at the needle entry site were infiltrated with 1% preservative-free lidocaine mixed with sodium bicarbonate using a 25-gauge 1-1/2-inch needle  A 22-gauge spinal needle was then advanced under fluoroscopic guidance in the lateral view at the sacral hiatus and guided to enter the sacral canal and directed towards the epidural space  Proper placement into the epidural space of the sacral canal in the midline was confirmed with fluoroscopy in the PA view   After negative aspiration for CSF or heme, contrast was injected which delineated the epidural space under fluoroscopy in the PA and lateral views  After negative aspiration was reconfirmed, a 13 mL injectate consisting of 3 mL of 40 mg/mL of Depo-Medrol, 3 mL of 1% preservative-free lidocaine, and 7 mL of preservative-free normal saline was slowly injected incrementally with negative aspiration between aliquots  The patient tolerated the procedure well and all needles were removed intact  Hemostasis was obtained and there were no paresthesias or apparent complications  The skin was wiped free of the antiseptic and a Band-Aid was placed as appropriate  The patient was monitored for an appropriate period of time following the procedure and remained hemodynamically stable and neurovascularly intact following the procedure  The patient was ultimately discharged to home with supervision in good condition and instructed to call the office in approximately 7-10 days with an update or sooner as warranted  Discharge and follow up instructions were provided  I was present for and participated in all key and critical portions of this procedure        Signatures   Electronically signed by : ROGELIO Butts ; Oct 10 2017 10:28AM EST                       (Author)

## 2018-03-31 DIAGNOSIS — G47.00 INSOMNIA, UNSPECIFIED TYPE: ICD-10-CM

## 2018-04-01 RX ORDER — ZOLPIDEM TARTRATE 10 MG/1
TABLET ORAL
Qty: 30 TABLET | Refills: 0 | Status: SHIPPED | OUTPATIENT
Start: 2018-04-01 | End: 2018-05-02 | Stop reason: SDUPTHER

## 2018-04-03 ENCOUNTER — OFFICE VISIT (OUTPATIENT)
Dept: VASCULAR SURGERY | Facility: CLINIC | Age: 63
End: 2018-04-03
Payer: COMMERCIAL

## 2018-04-03 VITALS
RESPIRATION RATE: 14 BRPM | BODY MASS INDEX: 24.62 KG/M2 | WEIGHT: 172 LBS | HEART RATE: 76 BPM | SYSTOLIC BLOOD PRESSURE: 130 MMHG | TEMPERATURE: 98.1 F | DIASTOLIC BLOOD PRESSURE: 80 MMHG | HEIGHT: 70 IN

## 2018-04-03 DIAGNOSIS — I83.892 VARICOSE VEINS OF LEFT LOWER EXTREMITY WITH COMPLICATIONS: Primary | ICD-10-CM

## 2018-04-03 PROCEDURE — 99243 OFF/OP CNSLTJ NEW/EST LOW 30: CPT | Performed by: SURGERY

## 2018-04-03 NOTE — PATIENT INSTRUCTIONS
Symptomatic varicosities left lower extremity most the then in the posterior aspect of the knee region  He has worn Ace bandages for 3-6 months without relief of his discomfort  He is unable to wear custom compression stockings due to his orthopedic injuries to his left foot    Planning reflux study and then back in the office for further evaluation and recommendations

## 2018-04-03 NOTE — ASSESSMENT & PLAN NOTE
Symptomatic varicosities left lower extremity mostly in the popliteal region  He has worn Ace wraps for 3-6 months and is unable to wear custom compression stockings due to orthopedic injuries as left foot    Will be checking a reflux study sometime in the near future in follow-up appointment

## 2018-04-03 NOTE — PROGRESS NOTES
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        Subjective:      Patient ID: Margie Garcia is a 60 y o  male  HPI heaviness and aching long day standing left lower extremity varicosities in the posterior popliteal region, no stasis dermatitis although he does present with itching    The following portions of the patient's history were reviewed and updated as appropriate: allergies, current medications, past family history, past medical history, past social history, past surgical history and problem list     Review of Systems  heaviness and aching left lower extremity, multiple orthopedic issues with bilateral feet and lower back all other systems negative    Objective:      /80 (BP Location: Left arm, Patient Position: Sitting, Cuff Size: Adult)   Pulse 76   Temp 98 1 °F (36 7 °C) (Tympanic)   Resp 14   Ht 5' 10" (1 778 m)   Wt 78 kg (172 lb)   BMI 24 68 kg/m²          Physical Exam       Physical Exam      Oriented x3 no evidence of clinical depression  Neck is supple carotid pulses equal bilaterally no bruits heard    Chest lungs clear, heart regular rhythm  Abdomen soft nontender no evidence of pulsatile masses  Pulses are palpable bilateral femoral , poplitea dorsalis pedis and posterior tibial pulses palpable bilaterally      Upon standing large varicosities seen in the posterior aspect of the left leg varicosities ranging in the 7-10 millimeter range    Neurological exam intact cranial nerves 2-12 grossly intact no gross motor sensory deficits detected      Vitals:    04/03/18 1149   BP: 130/80   BP Location: Left arm   Patient Position: Sitting   Cuff Size: Adult   Pulse: 76   Resp: 14   Temp: 98 1 °F (36 7 °C)   TempSrc: Tympanic   Weight: 78 kg (172 lb)   Height: 5' 10" (1 778 m)       Patient Active Problem List   Diagnosis    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       Past Surgical History:   Procedure Laterality Date    ELBOW SURGERY      FOOT SURGERY      KNEE SURGERY      PALATOPHARYNGOPLASTY      SINUS SURGERY      TONSILLECTOMY AND ADENOIDECTOMY      UMBILICAL HERNIA REPAIR         Family History   Problem Relation Age of Onset    Heart disease Mother     Transient ischemic attack Father        Social History     Social History    Marital status: /Civil Union     Spouse name: N/A    Number of children: N/A    Years of education: N/A     Occupational History    Not on file  Social History Main Topics    Smoking status: Never Smoker    Smokeless tobacco: Never Used    Alcohol use Yes      Comment: socially    Drug use: Unknown    Sexual activity: No     Other Topics Concern    Not on file     Social History Narrative    No narrative on file       Allergies   Allergen Reactions    Penicillins Anaphylaxis     Category:  Allergy;     Morphine Other (See Comments)     nausea    Sulfa Antibiotics Other (See Comments)     Unknown reaction         Current Outpatient Prescriptions:     B-COMPLEX-C PO, Take by mouth, Disp: , Rfl:     Calcium 250 MG CAPS, Take 500 mg by mouth, Disp: , Rfl:     celecoxib (CeleBREX) 200 mg capsule, Take 200 mg by mouth 2 (two) times a day, Disp: , Rfl:     cyanocobalamin (VITAMIN B-12) 100 mcg tablet, Take by mouth daily, Disp: , Rfl:     esomeprazole (NexIUM) 40 MG capsule, TAKE 1 CAPSULE DAILY AS NEEDED FOR GASTROESOPHAGEAL REFLUX DISEASE, Disp: 90 capsule, Rfl: 0    Fexofenadine HCl (ALLERGY 24-HR PO), Take by mouth, Disp: , Rfl:     gabapentin (NEURONTIN) 600 MG tablet, Take 3,000 mg by mouth daily, Disp: , Rfl:     glucosamine-chondroitin 500-400 MG tablet, Take 1 tablet by mouth 3 (three) times a day, Disp: , Rfl:     Magnesium 250 MG TABS, Take 500 mg by mouth, Disp: , Rfl:     milk thistle 175 MG tablet, Take 175 mg by mouth daily, Disp: , Rfl:     orphenadrine (NORFLEX) 100 mg tablet, TAKE 1 TABLET THREE TIMES A DAY AS NEEDED FOR MUSCLE SPASM, Disp: 270 tablet, Rfl: 0    oxyCODONE-acetaminophen (PERCOCET) 5-325 mg per tablet, Take 1 tablet by mouth every 4 (four) hours as needed for moderate pain, Disp: , Rfl:     Potassium Citrate GRAN, by Does not apply route, Disp: , Rfl:     predniSONE 2 5 mg tablet, Take 5 mg by mouth daily, Disp: , Rfl:     TURMERIC CURCUMIN PO, Take by mouth, Disp: , Rfl:     Zinc Sulfate (ZINC 15 PO), Take by mouth, Disp: , Rfl:     zolpidem (AMBIEN) 10 mg tablet, take 1 tablet by mouth at bedtime if needed, Disp: 30 tablet, Rfl: 0

## 2018-04-12 ENCOUNTER — HOSPITAL ENCOUNTER (OUTPATIENT)
Dept: NON INVASIVE DIAGNOSTICS | Facility: CLINIC | Age: 63
Discharge: HOME | End: 2018-04-12
Payer: COMMERCIAL

## 2018-04-12 DIAGNOSIS — I83.892 VARICOSE VEINS OF LEFT LOWER EXTREMITY WITH COMPLICATIONS: ICD-10-CM

## 2018-04-12 PROCEDURE — 93971 EXTREMITY STUDY: CPT | Performed by: SURGERY

## 2018-04-12 PROCEDURE — 93971 EXTREMITY STUDY: CPT

## 2018-05-02 ENCOUNTER — TRANSCRIBE ORDERS (OUTPATIENT)
Dept: ADMINISTRATIVE | Age: 63
End: 2018-05-02

## 2018-05-02 ENCOUNTER — APPOINTMENT (OUTPATIENT)
Dept: RADIOLOGY | Age: 63
End: 2018-05-02
Payer: COMMERCIAL

## 2018-05-02 DIAGNOSIS — M05.79 SEROPOSITIVE RHEUMATOID ARTHRITIS OF MULTIPLE SITES (HCC): Primary | ICD-10-CM

## 2018-05-02 DIAGNOSIS — G47.00 INSOMNIA, UNSPECIFIED TYPE: ICD-10-CM

## 2018-05-02 DIAGNOSIS — M20.091 OTHER DEFORMITY OF RIGHT FINGER(S): ICD-10-CM

## 2018-05-02 DIAGNOSIS — M05.79 SEROPOSITIVE RHEUMATOID ARTHRITIS OF MULTIPLE SITES (HCC): ICD-10-CM

## 2018-05-02 PROCEDURE — 73110 X-RAY EXAM OF WRIST: CPT

## 2018-05-02 PROCEDURE — 73120 X-RAY EXAM OF HAND: CPT

## 2018-05-02 RX ORDER — ZOLPIDEM TARTRATE 10 MG/1
TABLET ORAL
Qty: 30 TABLET | Refills: 5 | Status: SHIPPED | OUTPATIENT
Start: 2018-05-02 | End: 2018-05-04 | Stop reason: SDUPTHER

## 2018-05-03 RX ORDER — DULOXETIN HYDROCHLORIDE 30 MG/1
30 CAPSULE, DELAYED RELEASE ORAL DAILY
COMMUNITY
Start: 2018-04-16 | End: 2020-09-04

## 2018-05-04 ENCOUNTER — OFFICE VISIT (OUTPATIENT)
Dept: FAMILY MEDICINE CLINIC | Facility: CLINIC | Age: 63
End: 2018-05-04
Payer: COMMERCIAL

## 2018-05-04 VITALS
BODY MASS INDEX: 26.24 KG/M2 | SYSTOLIC BLOOD PRESSURE: 135 MMHG | DIASTOLIC BLOOD PRESSURE: 80 MMHG | HEIGHT: 67 IN | WEIGHT: 167.2 LBS

## 2018-05-04 DIAGNOSIS — K21.9 GASTROESOPHAGEAL REFLUX DISEASE WITHOUT ESOPHAGITIS: ICD-10-CM

## 2018-05-04 DIAGNOSIS — Z00.00 HEALTH CARE MAINTENANCE: Primary | ICD-10-CM

## 2018-05-04 DIAGNOSIS — G25.81 RLS (RESTLESS LEGS SYNDROME): ICD-10-CM

## 2018-05-04 DIAGNOSIS — G47.30 SLEEP APNEA, UNSPECIFIED TYPE: ICD-10-CM

## 2018-05-04 DIAGNOSIS — M10.9 GOUT, UNSPECIFIED CAUSE, UNSPECIFIED CHRONICITY, UNSPECIFIED SITE: ICD-10-CM

## 2018-05-04 DIAGNOSIS — Z12.5 SCREENING FOR PROSTATE CANCER: ICD-10-CM

## 2018-05-04 DIAGNOSIS — G47.00 INSOMNIA, UNSPECIFIED TYPE: ICD-10-CM

## 2018-05-04 DIAGNOSIS — G89.4 CHRONIC PAIN SYNDROME: ICD-10-CM

## 2018-05-04 PROCEDURE — 99396 PREV VISIT EST AGE 40-64: CPT | Performed by: FAMILY MEDICINE

## 2018-05-04 RX ORDER — ZOLPIDEM TARTRATE 10 MG/1
10 TABLET ORAL
Qty: 30 TABLET | Refills: 5 | Status: SHIPPED | OUTPATIENT
Start: 2018-05-04 | End: 2018-12-17 | Stop reason: SDUPTHER

## 2018-05-04 RX ORDER — OXYCODONE HYDROCHLORIDE 10 MG/1
10 TABLET ORAL 4 TIMES DAILY
Refills: 0 | COMMUNITY
Start: 2018-05-02

## 2018-05-04 NOTE — PROGRESS NOTES
Assessment/Plan:  Chief Complaint   Patient presents with    Physical Exam     Patient Instructions   Insomnia, likely due to decreased opiods used daily and replaced with increased gabapentin use  Pt  Will use Zolpidem tartrate prn insomnia and not to use with opioids at sleep  He does have a hx of MOUNIKA and also RLS and chronic pain that add to his insomnia  Pain control as per Pain management for lumbar post laminectomy syndrome, DDD of lumbar spine, low back pain and multiple joint pain and ongoing therapy for RA will help with his insomnia also  Call if any problems  F-up in office as directed  F-up with Rheumatologist as directed  No problem-specific Assessment & Plan notes found for this encounter  Diagnoses and all orders for this visit:    Health care maintenance  -     Comprehensive metabolic panel; Future  -     Lipid panel; Future  -     PSA, total and free; Future  -     CBC and differential; Future    Gout, unspecified cause, unspecified chronicity, unspecified site  -     Comprehensive metabolic panel; Future  -     Lipid panel; Future  -     PSA, total and free; Future  -     CBC and differential; Future    Gastroesophageal reflux disease without esophagitis  -     Comprehensive metabolic panel; Future  -     Lipid panel; Future  -     PSA, total and free; Future  -     CBC and differential; Future    Insomnia, unspecified type  -     zolpidem (AMBIEN) 10 mg tablet; Take 1 tablet (10 mg total) by mouth daily at bedtime as needed for sleep  -     Comprehensive metabolic panel; Future  -     Lipid panel; Future  -     PSA, total and free; Future  -     CBC and differential; Future    Chronic pain syndrome  -     Comprehensive metabolic panel; Future  -     Lipid panel; Future  -     PSA, total and free; Future  -     CBC and differential; Future    RLS (restless legs syndrome)  -     Comprehensive metabolic panel; Future  -     Lipid panel; Future  -     PSA, total and free;  Future  - CBC and differential; Future    Sleep apnea, unspecified type  -     Comprehensive metabolic panel; Future  -     Lipid panel; Future  -     PSA, total and free; Future  -     CBC and differential; Future    Screening for prostate cancer  -     Comprehensive metabolic panel; Future  -     Lipid panel; Future  -     PSA, total and free; Future  -     CBC and differential; Future    Other orders  -     oxyCODONE (ROXICODONE) 10 MG TABS; Take 10 mg by mouth every 6 (six) hours as needed          Subjective:      Patient ID: Pepe Mendez is a 60 y o  male  Here for general PE and sees specialist for gout in [place], [place]  The following portions of the patient's history were reviewed and updated as appropriate: allergies, current medications, past medical history and problem list     Review of Systems   Constitutional: Negative  HENT: Negative  Eyes: Negative  Respiratory: Negative  Cardiovascular: Negative  Gastrointestinal: Negative  Endocrine: Negative  Genitourinary: Negative  Musculoskeletal: Negative  Skin: Negative  Allergic/Immunologic: Negative  Neurological: Negative  Hematological: Negative  Psychiatric/Behavioral: Negative  Objective:      /80   Ht 5' 7" (1 702 m)   Wt 75 8 kg (167 lb 3 2 oz)   BMI 26 19 kg/m²          Physical Exam   Constitutional: He is oriented to person, place, and time  He appears well-developed and well-nourished  HENT:   Head: Normocephalic and atraumatic  Right Ear: External ear normal    Left Ear: External ear normal    Nose: Nose normal    Mouth/Throat: Oropharynx is clear and moist    Eyes: Conjunctivae and EOM are normal  Pupils are equal, round, and reactive to light  Neck: Normal range of motion  Neck supple  Cardiovascular: Normal rate, regular rhythm, normal heart sounds and intact distal pulses  Pulmonary/Chest: Effort normal and breath sounds normal    Abdominal: Soft   Bowel sounds are normal  Genitourinary: Prostate normal and penis normal  Rectal exam shows guaiac negative stool  Musculoskeletal: Normal range of motion  Neurological: He is alert and oriented to person, place, and time  He has normal reflexes  Skin: Skin is warm and dry  Psychiatric: He has a normal mood and affect   His behavior is normal

## 2018-05-04 NOTE — PATIENT INSTRUCTIONS
Insomnia, likely due to decreased opiods used daily and replaced with increased gabapentin use  Pt  Will use Zolpidem tartrate prn insomnia and not to use with opioids at sleep  He does have a hx of MOUNIKA and also RLS and chronic pain that add to his insomnia  Pain control as per Pain management for lumbar post laminectomy syndrome, DDD of lumbar spine, low back pain and multiple joint pain and ongoing therapy for RA will help with his insomnia also  Call if any problems  F-up in office as directed  F-up with Rheumatologist as directed

## 2018-05-08 ENCOUNTER — OFFICE VISIT (OUTPATIENT)
Dept: VASCULAR SURGERY | Facility: CLINIC | Age: 63
End: 2018-05-08
Payer: COMMERCIAL

## 2018-05-08 VITALS
HEIGHT: 70 IN | DIASTOLIC BLOOD PRESSURE: 90 MMHG | WEIGHT: 167 LBS | HEART RATE: 84 BPM | BODY MASS INDEX: 23.91 KG/M2 | RESPIRATION RATE: 16 BRPM | SYSTOLIC BLOOD PRESSURE: 158 MMHG | TEMPERATURE: 97.9 F

## 2018-05-08 DIAGNOSIS — I83.892 VARICOSE VEINS OF LEFT LOWER EXTREMITY WITH COMPLICATIONS: Primary | ICD-10-CM

## 2018-05-08 PROCEDURE — 99214 OFFICE O/P EST MOD 30 MIN: CPT | Performed by: SURGERY

## 2018-05-08 NOTE — PATIENT INSTRUCTIONS
If  Varicose veins starts bleeding please hold pressure and get to nearest emergency department  Varicose Veins   AMBULATORY CARE:   Varicose veins  are veins that become large, twisted, and swollen  They are common on the back of the calves, knees, and thighs  Varicose veins are caused by valves in your veins that do not work properly  This causes blood to collect and increase pressure in the veins of your legs  The increased pressure causes your veins to stretch, get larger, swell, and twist        Common symptoms include the following: Your symptoms may be worse after you stand or sit for long periods of time  You may have any of the following:  · Blue, purple, or bulging veins in your legs     · Pain, swelling, or muscle cramps in your legs    · Feeling of fatigue or heaviness in your legs    · Cramping in your legs  Seek care immediately if:   · You have a wound that does not heal or is infected  · You have an injury that has broken your skin and caused your varicose veins to bleed  · Your leg is swollen and hard  · You notice that your legs or feet are turning blue or black  · Your leg feels warm, tender, and painful  It may look swollen and red  Contact your healthcare provider if:   · You have pain in your leg that does not go away or gets worse  · You notice sudden large bruising on your legs  · You have a rash on your leg  · Your symptoms keep you from doing your daily activities  · You have questions or concerns about your condition or care  Treatment of varicose veins  aims to decrease symptoms, improve appearance, and prevent further problems  Treatment will depend on which veins are affected and how severe your condition is  You may need procedures to treat or remove your varicose veins  For example, your healthcare provider may inject a solution or use a laser to close the varicose veins  Surgery to remove long veins may also be done   Ask your healthcare provider for more information about procedures used to treat varicose veins  Manage varicose veins:   · Do not sit or stand for long periods of time  This can cause the blood to collect in your legs and make your symptoms worse  Bend or rotate your ankles several times every hour  Walk around for a few minutes every hour to get blood moving in your legs  · Do not cross your legs when you sit  This decreases blood flow to your feet and can make your symptoms worse  · Do not wear tight clothing or shoes  Do not wear high-heeled shoes  Do not wear clothes that are tight around the waist or knees  · Maintain a healthy weight  Being overweight or obese can make your varicose veins worse  Ask your healthcare provider how much you should weigh  Ask him or her to help you create a weight loss plan if you are overweight  · Wear pressure stockings as directed  The stockings are tight and put pressure on your legs  They improve blood flow and help prevent clots  · Elevate your legs  Keep them above the level of your heart for 15 to 30 minutes several times a day  You can also prop the end of your bed up slightly to elevate your legs while you sleep  This will help blood to flow back to your heart  · Get regular exercise  Talk to your healthcare provider about the best exercise plan for you  Exercise can improve blood flow to your legs and feet  Follow up with your healthcare provider as directed:  Write down your questions so you remember to ask them during your visits  © 2017 [employer] Information is for End User's use only and may not be sold, redistributed or otherwise used for commercial purposes  All illustrations and images included in CareNotes® are the copyrighted property of [employer]  or [employer]  The above information is an  only  It is not intended as medical advice for individual conditions or treatments   Talk to your doctor, nurse or pharmacist before following any medical regimen to see if it is safe and effective for you

## 2018-05-08 NOTE — PROGRESS NOTES
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   Longstanding symptomatic left lower extremity varicose veins associated with erythema and itching  Venous reflux study demonstrates incompetence of the left greater saphenous vein below the knee  He has prominent bulky posterior knee varicosities  The small saphenous vein appears competent  Patient has worn compression stockings without relief  Of note patient has advanced rheumatoid arthritis with over 23 surgeries in the past including artificial toe joint and "excision of  Several toe joints"  Due to his underlying advanced rheumatoid arthritis the ability to wear compression stockings has been severely limited  At this time patient main complaint is concern for bleeding associated with the left posterior knee varicosity as well as intractable pruritus   Diagnoses and all orders for this visit:    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    Other orders  -     Tocilizumab (ACTEMRA IV); Infuse into a venous catheter every 30 (thirty) days          Subjective: " I am here for results"      Patient ID: Dimitrios Cornejo is a 60 y o  male  Patient is here for results of [facility] Shown on 4/12/18  Patient has bulging veins on left leg  Patient is concerned of Heavyness and Achyness behind Left knee when standing a long period of time, he was wearing Ace wraps for 3-6 months because he is unable to wear compression due to orthopedic injury in Left foot  He complains of hot feet and swelling  Darlene Laughlin is a 21-year-old gentleman with longstanding history of symptomatic left lower extremity varicose veins which have progressively worsened over time  He has worn compression stockings without relief  He has prominent left posterior knee varicosities associated with discomfort, erythema and intractable pruritus      He has history of advanced rheumatoid arthritis with over 20 surgeries in the past including "artificial toe joint and excision of toe joint"        The following portions of the patient's history were reviewed and updated as appropriate: allergies, current medications, past family history, past medical history, past social history, past surgical history and problem list     Review of Systems   Constitutional: Negative  HENT: Positive for tinnitus  Eyes: Negative  Respiratory: Negative  Cardiovascular: Negative  Gastrointestinal: Negative  Endocrine: Negative  Genitourinary: Negative  Musculoskeletal: Negative  Skin: Negative  Allergic/Immunologic: Negative  Neurological: Negative  Hematological: Negative  Psychiatric/Behavioral: Negative  I have personally reviewed the ROS entered by MA and agree as documented  Vitals:    05/08/18 1046   BP: 158/90   BP Location: Left arm   Patient Position: Sitting   Pulse: 84   Resp: 16   Temp: 97 9 °F (36 6 °C)   TempSrc: Tympanic   Weight: 75 8 kg (167 lb)   Height: 5' 10" (1 778 m)       Patient Active Problem List   Diagnosis    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       Past Surgical History:   Procedure Laterality Date    ELBOW SURGERY Right     tendon repair    FOOT SURGERY      KNEE SURGERY      NERVE BLOCK Right     peripheral nerve block wrist radial    PALATOPHARYNGOPLASTY      SINUS SURGERY      TONSILLECTOMY AND ADENOIDECTOMY      UMBILICAL HERNIA REPAIR         Family History   Problem Relation Age of Onset    Heart disease Mother     Heart failure Mother     Transient ischemic attack Father     Gout Brother     Stroke Family      CVA       Social History     Social History    Marital status: /Civil Union     Spouse name: N/A    Number of children: N/A    Years of education: N/A     Occupational History    Not on file       Social History Main Topics    Smoking status: Never Smoker    Smokeless tobacco: Never Used    Alcohol use Yes      Comment: socially; 1-2 drinks every other week    Drug use: No    Sexual activity: No     Other Topics Concern    Not on file     Social History Narrative    Drinks coffee       Allergies   Allergen Reactions    Penicillins Anaphylaxis     Category:  Allergy;     Morphine Other (See Comments)     nausea    Sulfa Antibiotics Other (See Comments)     Unknown reaction         Current Outpatient Prescriptions:     B-COMPLEX-C PO, Take by mouth, Disp: , Rfl:     Calcium 250 MG CAPS, Take 500 mg by mouth, Disp: , Rfl:     celecoxib (CeleBREX) 200 mg capsule, Take 200 mg by mouth 2 (two) times a day, Disp: , Rfl:     cyanocobalamin (VITAMIN B-12) 100 mcg tablet, Take by mouth daily, Disp: , Rfl:     DULoxetine (CYMBALTA) 30 mg delayed release capsule, , Disp: , Rfl:     esomeprazole (NexIUM) 40 MG capsule, TAKE 1 CAPSULE DAILY AS NEEDED FOR GASTROESOPHAGEAL REFLUX DISEASE, Disp: 90 capsule, Rfl: 0    Fexofenadine HCl (ALLERGY 24-HR PO), Take by mouth, Disp: , Rfl:     gabapentin (NEURONTIN) 600 MG tablet, Take 3,000 mg by mouth daily, Disp: , Rfl:     glucosamine-chondroitin 500-400 MG tablet, Take 1 tablet by mouth 3 (three) times a day, Disp: , Rfl:     Magnesium 250 MG TABS, Take 500 mg by mouth, Disp: , Rfl:     milk thistle 175 MG tablet, Take 175 mg by mouth daily, Disp: , Rfl:     orphenadrine (NORFLEX) 100 mg tablet, TAKE 1 TABLET THREE TIMES A DAY AS NEEDED FOR MUSCLE SPASM, Disp: 270 tablet, Rfl: 0    oxyCODONE (ROXICODONE) 10 MG TABS, Take 10 mg by mouth every 6 (six) hours as needed, Disp: , Rfl: 0    Potassium Citrate GRAN, by Does not apply route, Disp: , Rfl:     predniSONE 2 5 mg tablet, Take 5 mg by mouth daily, Disp: , Rfl:     Tocilizumab (ACTEMRA IV), Infuse into a venous catheter every 30 (thirty) days, Disp: , Rfl:     TURMERIC CURCUMIN PO, Take by mouth, Disp: , Rfl:     Zinc Sulfate (ZINC 15 PO), Take by mouth, Disp: , Rfl:     zolpidem (AMBIEN) 10 mg tablet, Take 1 tablet (10 mg total) by mouth daily at bedtime as needed for sleep, Disp: 30 tablet, Rfl: 5      Imaging:  I have reviewed the imaging and the findings are:  CONCLUSION:  Impression:  LEFT LIMB:  Deep venous incompetence is noted common femoral vein, femoral vein, and  popliteal vein  The great saphenous vein is incompetent below the knee  The great saphenous becomes small in diameter from the mid to distal thigh  The great saphenous vein exits the saphenous compartment in the distal thigh  There are varicosities that communicate with the great saphenous vein in the  thigh and below the knee  The small saphenous vein is competent and communicates with the popliteal vein  through a gastrocnemius vein  There is a large varicose vein that communicates with the popliteal vein  There is no evidence of incompetent perforators in the thigh or calf  There is no evidence of deep vein thrombosis in the CFV, the proximal PFV, the  femoral vein and the popliteal vein  There is a well defined hypoechoic non-vascular structure in the popliteal  fossa, consistent with a baker's cyst   Objective:      /90 (BP Location: Left arm, Patient Position: Sitting)   Pulse 84   Temp 97 9 °F (36 6 °C) (Tympanic)   Resp 16   Ht 5' 10" (1 778 m)   Wt 75 8 kg (167 lb)   BMI 23 96 kg/m²          Physical Exam   Constitutional: He is oriented to person, place, and time  No distress  HENT:   Head: Normocephalic and atraumatic  Eyes: EOM are normal    Neck: Neck supple  No JVD present  No tracheal deviation present  Cardiovascular: Normal rate and regular rhythm  Pulmonary/Chest: Effort normal and breath sounds normal    Musculoskeletal: He exhibits deformity  Multiple finger and toe deformity secondary to underlying rheumatoid arthritis   Neurological: He is alert and oriented to person, place, and time     Skin:    Large prominent left posterior knee /lower leg varicosities

## 2018-05-08 NOTE — H&P
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   Longstanding symptomatic left lower extremity varicose veins associated with erythema and itching  Venous reflux study demonstrates incompetence of the left greater saphenous vein below the knee  He has prominent bulky posterior knee varicosities  The small saphenous vein appears competent  Patient has worn compression stockings without relief  Of note patient has advanced rheumatoid arthritis with over 23 surgeries in the past including artificial toe joint and "excision of  Several toe joints"  Due to his underlying advanced rheumatoid arthritis the ability to wear compression stockings has been severely limited  At this time patient main complaint is concern for bleeding associated with the left posterior knee varicosity as well as intractable pruritus   Diagnoses and all orders for this visit:    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    Other orders  -     Tocilizumab (ACTEMRA IV); Infuse into a venous catheter every 30 (thirty) days          Subjective: " I am here for results"      Patient ID: Jermaine Gloria is a 60 y o  male  Patient is here for results of Kinjal Molt on 4/12/18  Patient has bulging veins on left leg  Patient is concerned of Heavyness and Achyness behind Left knee when standing a long period of time, he was wearing Ace wraps for 3-6 months because he is unable to wear compression due to orthopedic injury in Left foot  He complains of hot feet and swelling  Victor Manuel Chen is a 71-year-old gentleman with longstanding history of symptomatic left lower extremity varicose veins which have progressively worsened over time  He has worn compression stockings without relief  He has prominent left posterior knee varicosities associated with discomfort, erythema and intractable pruritus      He has history of advanced rheumatoid arthritis with over 20 surgeries in the past including "artificial toe joint and excision of toe joint"        The following portions of the patient's history were reviewed and updated as appropriate: allergies, current medications, past family history, past medical history, past social history, past surgical history and problem list     Review of Systems   Constitutional: Negative  HENT: Positive for tinnitus  Eyes: Negative  Respiratory: Negative  Cardiovascular: Negative  Gastrointestinal: Negative  Endocrine: Negative  Genitourinary: Negative  Musculoskeletal: Negative  Skin: Negative  Allergic/Immunologic: Negative  Neurological: Negative  Hematological: Negative  Psychiatric/Behavioral: Negative  I have personally reviewed the ROS entered by MA and agree as documented  Vitals:    05/08/18 1046   BP: 158/90   BP Location: Left arm   Patient Position: Sitting   Pulse: 84   Resp: 16   Temp: 97 9 °F (36 6 °C)   TempSrc: Tympanic   Weight: 75 8 kg (167 lb)   Height: 5' 10" (1 778 m)       Patient Active Problem List   Diagnosis    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       Past Surgical History:   Procedure Laterality Date    ELBOW SURGERY Right     tendon repair    FOOT SURGERY      KNEE SURGERY      NERVE BLOCK Right     peripheral nerve block wrist radial    PALATOPHARYNGOPLASTY      SINUS SURGERY      TONSILLECTOMY AND ADENOIDECTOMY      UMBILICAL HERNIA REPAIR         Family History   Problem Relation Age of Onset    Heart disease Mother     Heart failure Mother     Transient ischemic attack Father     Gout Brother     Stroke Family      CVA       Social History     Social History    Marital status: /Civil Union     Spouse name: N/A    Number of children: N/A    Years of education: N/A     Occupational History    Not on file       Social History Main Topics    Smoking status: Never Smoker    Smokeless tobacco: Never Used    Alcohol use Yes      Comment: socially; 1-2 drinks every other week    Drug use: No    Sexual activity: No     Other Topics Concern    Not on file     Social History Narrative    Drinks coffee       Allergies   Allergen Reactions    Penicillins Anaphylaxis     Category:  Allergy;     Morphine Other (See Comments)     nausea    Sulfa Antibiotics Other (See Comments)     Unknown reaction         Current Outpatient Prescriptions:     B-COMPLEX-C PO, Take by mouth, Disp: , Rfl:     Calcium 250 MG CAPS, Take 500 mg by mouth, Disp: , Rfl:     celecoxib (CeleBREX) 200 mg capsule, Take 200 mg by mouth 2 (two) times a day, Disp: , Rfl:     cyanocobalamin (VITAMIN B-12) 100 mcg tablet, Take by mouth daily, Disp: , Rfl:     DULoxetine (CYMBALTA) 30 mg delayed release capsule, , Disp: , Rfl:     esomeprazole (NexIUM) 40 MG capsule, TAKE 1 CAPSULE DAILY AS NEEDED FOR GASTROESOPHAGEAL REFLUX DISEASE, Disp: 90 capsule, Rfl: 0    Fexofenadine HCl (ALLERGY 24-HR PO), Take by mouth, Disp: , Rfl:     gabapentin (NEURONTIN) 600 MG tablet, Take 3,000 mg by mouth daily, Disp: , Rfl:     glucosamine-chondroitin 500-400 MG tablet, Take 1 tablet by mouth 3 (three) times a day, Disp: , Rfl:     Magnesium 250 MG TABS, Take 500 mg by mouth, Disp: , Rfl:     milk thistle 175 MG tablet, Take 175 mg by mouth daily, Disp: , Rfl:     orphenadrine (NORFLEX) 100 mg tablet, TAKE 1 TABLET THREE TIMES A DAY AS NEEDED FOR MUSCLE SPASM, Disp: 270 tablet, Rfl: 0    oxyCODONE (ROXICODONE) 10 MG TABS, Take 10 mg by mouth every 6 (six) hours as needed, Disp: , Rfl: 0    Potassium Citrate GRAN, by Does not apply route, Disp: , Rfl:     predniSONE 2 5 mg tablet, Take 5 mg by mouth daily, Disp: , Rfl:     Tocilizumab (ACTEMRA IV), Infuse into a venous catheter every 30 (thirty) days, Disp: , Rfl:     TURMERIC CURCUMIN PO, Take by mouth, Disp: , Rfl:     Zinc Sulfate (ZINC 15 PO), Take by mouth, Disp: , Rfl:     zolpidem (AMBIEN) 10 mg tablet, Take 1 tablet (10 mg total) by mouth daily at bedtime as needed for sleep, Disp: 30 tablet, Rfl: 5      Imaging:  I have reviewed the imaging and the findings are:  CONCLUSION:  Impression:  LEFT LIMB:  Deep venous incompetence is noted common femoral vein, femoral vein, and  popliteal vein  The great saphenous vein is incompetent below the knee  The great saphenous becomes small in diameter from the mid to distal thigh  The great saphenous vein exits the saphenous compartment in the distal thigh  There are varicosities that communicate with the great saphenous vein in the  thigh and below the knee  The small saphenous vein is competent and communicates with the popliteal vein  through a gastrocnemius vein  There is a large varicose vein that communicates with the popliteal vein  There is no evidence of incompetent perforators in the thigh or calf  There is no evidence of deep vein thrombosis in the CFV, the proximal PFV, the  femoral vein and the popliteal vein  There is a well defined hypoechoic non-vascular structure in the popliteal  fossa, consistent with a baker's cyst   Objective:      /90 (BP Location: Left arm, Patient Position: Sitting)   Pulse 84   Temp 97 9 °F (36 6 °C) (Tympanic)   Resp 16   Ht 5' 10" (1 778 m)   Wt 75 8 kg (167 lb)   BMI 23 96 kg/m²           Physical Exam   Constitutional: He is oriented to person, place, and time  No distress  HENT:   Head: Normocephalic and atraumatic  Eyes: EOM are normal    Neck: Neck supple  No JVD present  No tracheal deviation present  Cardiovascular: Normal rate and regular rhythm  Pulmonary/Chest: Effort normal and breath sounds normal    Musculoskeletal: He exhibits deformity  Multiple finger and toe deformity secondary to underlying rheumatoid arthritis   Neurological: He is alert and oriented to person, place, and time     Skin:    Large prominent left posterior knee /lower leg varicosities

## 2018-05-08 NOTE — ASSESSMENT & PLAN NOTE
Longstanding symptomatic left lower extremity varicose veins associated with erythema and itching  Venous reflux study demonstrates incompetence of the left greater saphenous vein below the knee  He has prominent bulky posterior knee varicosities  The small saphenous vein appears competent  Patient has worn compression stockings without relief  Of note patient has advanced rheumatoid arthritis with over 23 surgeries in the past including artificial toe joint and "excision of  Several toe joints"  Due to his underlying advanced rheumatoid arthritis the ability to wear compression stockings has been severely limited  At this time patient main complaint is concern for bleeding associated with the left posterior knee varicosity as well as intractable pruritus

## 2018-05-19 DIAGNOSIS — K21.9 GASTROESOPHAGEAL REFLUX DISEASE WITHOUT ESOPHAGITIS: ICD-10-CM

## 2018-05-20 RX ORDER — ESOMEPRAZOLE MAGNESIUM 40 MG/1
CAPSULE, DELAYED RELEASE ORAL
Qty: 90 CAPSULE | Refills: 0 | Status: SHIPPED | OUTPATIENT
Start: 2018-05-20 | End: 2018-08-17 | Stop reason: SDUPTHER

## 2018-06-20 DIAGNOSIS — M62.838 MUSCLE SPASM: ICD-10-CM

## 2018-06-20 RX ORDER — ORPHENADRINE CITRATE 100 MG/1
100 TABLET, EXTENDED RELEASE ORAL 2 TIMES DAILY PRN
Qty: 270 TABLET | Refills: 0 | Status: SHIPPED | OUTPATIENT
Start: 2018-06-20 | End: 2018-06-22 | Stop reason: SDUPTHER

## 2018-06-22 DIAGNOSIS — M62.838 MUSCLE SPASM: ICD-10-CM

## 2018-06-22 RX ORDER — ORPHENADRINE CITRATE 100 MG/1
100 TABLET, EXTENDED RELEASE ORAL 3 TIMES DAILY PRN
Qty: 42 TABLET | Refills: 0 | Status: SHIPPED | OUTPATIENT
Start: 2018-06-22 | End: 2018-10-24 | Stop reason: SDUPTHER

## 2018-06-22 NOTE — TELEPHONE ENCOUNTER
Patient is out of his muscle relaxers  We refilled them for his mail order but hes completely out now   Asking for a 2 week supply to his local pharm

## 2018-07-02 DIAGNOSIS — M62.838 MUSCLE SPASM: ICD-10-CM

## 2018-07-02 RX ORDER — ORPHENADRINE CITRATE 100 MG/1
100 TABLET, EXTENDED RELEASE ORAL 3 TIMES DAILY PRN
Qty: 270 TABLET | Refills: 0 | Status: CANCELLED | OUTPATIENT
Start: 2018-07-02 | End: 2018-09-30

## 2018-07-02 NOTE — TELEPHONE ENCOUNTER
PT wife called stating they only received a quantity of 180 instead of 270 for his orphenadrine 100 mg tid ; she has enough for 1 month but not the 3 month supply requested  OK to pend rx for total 3 month quantity?

## 2018-07-24 ENCOUNTER — DOCUMENTATION (OUTPATIENT)
Dept: VASCULAR SURGERY | Facility: CLINIC | Age: 63
End: 2018-07-24

## 2018-07-24 NOTE — PROGRESS NOTES
Rec 2 phone calls from the patient, one Monday 7/23 and one today stating his insurance company does not have a record of us starting an auth for upcoming LLE phlebs  I started auth today on [place]

## 2018-07-30 ENCOUNTER — TELEPHONE (OUTPATIENT)
Dept: VASCULAR SURGERY | Facility: CLINIC | Age: 63
End: 2018-07-30

## 2018-07-30 NOTE — TELEPHONE ENCOUNTER
I spoke to pt and confirmed date of surgery with Dr Krysta Larkin at [facility]  Pt will go for blood work ordered and have H&P with his PCP  Surgery and showering instructions reviewed and understood

## 2018-08-01 ENCOUNTER — ANESTHESIA EVENT (OUTPATIENT)
Dept: PERIOP | Facility: AMBULARY SURGERY CENTER | Age: 63
End: 2018-08-01
Payer: COMMERCIAL

## 2018-08-03 ENCOUNTER — TELEPHONE (OUTPATIENT)
Dept: VASCULAR SURGERY | Facility: CLINIC | Age: 63
End: 2018-08-03

## 2018-08-03 NOTE — TELEPHONE ENCOUNTER
Pt and his wife called to reschedule date of his surgery on 8/10 with Dr Krysta Larkin  We rescheduled it to 10/05/18  Pt knows to go for labs and H&P within 30 days of surgery

## 2018-08-10 ENCOUNTER — ANESTHESIA (OUTPATIENT)
Dept: PERIOP | Facility: AMBULARY SURGERY CENTER | Age: 63
End: 2018-08-10
Payer: COMMERCIAL

## 2018-08-17 DIAGNOSIS — K21.9 GASTROESOPHAGEAL REFLUX DISEASE WITHOUT ESOPHAGITIS: ICD-10-CM

## 2018-08-17 RX ORDER — ESOMEPRAZOLE MAGNESIUM 40 MG/1
CAPSULE, DELAYED RELEASE ORAL
Qty: 90 CAPSULE | Refills: 0 | Status: SHIPPED | OUTPATIENT
Start: 2018-08-17 | End: 2018-11-15 | Stop reason: SDUPTHER

## 2018-08-23 ENCOUNTER — TELEPHONE (OUTPATIENT)
Dept: VASCULAR SURGERY | Facility: CLINIC | Age: 63
End: 2018-08-23

## 2018-08-23 NOTE — TELEPHONE ENCOUNTER
Due to a change in the Drs schedule we changed the date of surgery to 10/12/18  I spoke to pt and he is okay with this date

## 2018-09-12 ENCOUNTER — TELEPHONE (OUTPATIENT)
Dept: VASCULAR SURGERY | Facility: CLINIC | Age: 63
End: 2018-09-12

## 2018-09-19 ENCOUNTER — TELEPHONE (OUTPATIENT)
Dept: FAMILY MEDICINE CLINIC | Facility: CLINIC | Age: 63
End: 2018-09-19

## 2018-09-19 DIAGNOSIS — J32.9 SINUSITIS, UNSPECIFIED CHRONICITY, UNSPECIFIED LOCATION: Primary | ICD-10-CM

## 2018-09-19 RX ORDER — AZITHROMYCIN 250 MG/1
TABLET, FILM COATED ORAL
Qty: 6 TABLET | Refills: 0 | Status: SHIPPED | OUTPATIENT
Start: 2018-09-19 | End: 2018-09-23

## 2018-09-19 NOTE — TELEPHONE ENCOUNTER
Patients wife called and stated pt has an ear infection and his balance is off  Asking if he can get an antibotic sent over to [facility] in [place]

## 2018-10-04 ENCOUNTER — OFFICE VISIT (OUTPATIENT)
Dept: FAMILY MEDICINE CLINIC | Facility: CLINIC | Age: 63
End: 2018-10-04
Payer: COMMERCIAL

## 2018-10-04 ENCOUNTER — APPOINTMENT (OUTPATIENT)
Dept: LAB | Facility: CLINIC | Age: 63
End: 2018-10-04
Payer: COMMERCIAL

## 2018-10-04 VITALS
HEIGHT: 70 IN | SYSTOLIC BLOOD PRESSURE: 124 MMHG | BODY MASS INDEX: 24.02 KG/M2 | WEIGHT: 167.8 LBS | DIASTOLIC BLOOD PRESSURE: 86 MMHG

## 2018-10-04 DIAGNOSIS — Z79.899 NEED FOR PROPHYLACTIC CHEMOTHERAPY: ICD-10-CM

## 2018-10-04 DIAGNOSIS — M05.79 SEROPOSITIVE RHEUMATOID ARTHRITIS OF MULTIPLE SITES (HCC): Primary | ICD-10-CM

## 2018-10-04 DIAGNOSIS — Z23 NEED FOR INFLUENZA VACCINATION: Primary | ICD-10-CM

## 2018-10-04 DIAGNOSIS — I83.892 VARICOSE VEINS OF LEFT LOWER EXTREMITY WITH COMPLICATIONS: ICD-10-CM

## 2018-10-04 DIAGNOSIS — B35.1 ONYCHOMYCOSIS: ICD-10-CM

## 2018-10-04 LAB
ALBUMIN SERPL BCP-MCNC: 3.9 G/DL (ref 3.5–5)
ALP SERPL-CCNC: 71 U/L (ref 46–116)
ALT SERPL W P-5'-P-CCNC: 37 U/L (ref 12–78)
ANION GAP SERPL CALCULATED.3IONS-SCNC: 7 MMOL/L (ref 4–13)
AST SERPL W P-5'-P-CCNC: 24 U/L (ref 5–45)
BASOPHILS # BLD AUTO: 0.04 THOUSANDS/ΜL (ref 0–0.1)
BASOPHILS NFR BLD AUTO: 1 % (ref 0–1)
BILIRUB DIRECT SERPL-MCNC: 0.21 MG/DL (ref 0–0.2)
BILIRUB SERPL-MCNC: 0.73 MG/DL (ref 0.2–1)
BUN SERPL-MCNC: 16 MG/DL (ref 5–25)
CALCIUM SERPL-MCNC: 9.3 MG/DL (ref 8.3–10.1)
CHLORIDE SERPL-SCNC: 103 MMOL/L (ref 100–108)
CO2 SERPL-SCNC: 25 MMOL/L (ref 21–32)
CREAT SERPL-MCNC: 0.8 MG/DL (ref 0.6–1.3)
CRP SERPL QL: <3 MG/L
EOSINOPHIL # BLD AUTO: 0.07 THOUSAND/ΜL (ref 0–0.61)
EOSINOPHIL NFR BLD AUTO: 1 % (ref 0–6)
ERYTHROCYTE [DISTWIDTH] IN BLOOD BY AUTOMATED COUNT: 12.1 % (ref 11.6–15.1)
ERYTHROCYTE [SEDIMENTATION RATE] IN BLOOD: 3 MM/HOUR (ref 0–10)
GFR SERPL CREATININE-BSD FRML MDRD: 94 ML/MIN/1.73SQ M
GLUCOSE SERPL-MCNC: 107 MG/DL (ref 65–140)
HCT VFR BLD AUTO: 45.1 % (ref 36.5–49.3)
HGB BLD-MCNC: 15.2 G/DL (ref 12–17)
IMM GRANULOCYTES # BLD AUTO: 0.02 THOUSAND/UL (ref 0–0.2)
IMM GRANULOCYTES NFR BLD AUTO: 0 % (ref 0–2)
LYMPHOCYTES # BLD AUTO: 0.56 THOUSANDS/ΜL (ref 0.6–4.47)
LYMPHOCYTES NFR BLD AUTO: 7 % (ref 14–44)
MCH RBC QN AUTO: 34.6 PG (ref 26.8–34.3)
MCHC RBC AUTO-ENTMCNC: 33.7 G/DL (ref 31.4–37.4)
MCV RBC AUTO: 103 FL (ref 82–98)
MONOCYTES # BLD AUTO: 0.63 THOUSAND/ΜL (ref 0.17–1.22)
MONOCYTES NFR BLD AUTO: 7 % (ref 4–12)
NEUTROPHILS # BLD AUTO: 7.24 THOUSANDS/ΜL (ref 1.85–7.62)
NEUTS SEG NFR BLD AUTO: 84 % (ref 43–75)
NRBC BLD AUTO-RTO: 0 /100 WBCS
PLATELET # BLD AUTO: 171 THOUSANDS/UL (ref 149–390)
PMV BLD AUTO: 9.8 FL (ref 8.9–12.7)
POTASSIUM SERPL-SCNC: 4.3 MMOL/L (ref 3.5–5.3)
PROT SERPL-MCNC: 6.7 G/DL (ref 6.4–8.2)
RBC # BLD AUTO: 4.39 MILLION/UL (ref 3.88–5.62)
SODIUM SERPL-SCNC: 135 MMOL/L (ref 136–145)
WBC # BLD AUTO: 8.56 THOUSAND/UL (ref 4.31–10.16)

## 2018-10-04 PROCEDURE — 85652 RBC SED RATE AUTOMATED: CPT

## 2018-10-04 PROCEDURE — 90471 IMMUNIZATION ADMIN: CPT

## 2018-10-04 PROCEDURE — 90682 RIV4 VACC RECOMBINANT DNA IM: CPT

## 2018-10-04 PROCEDURE — 86140 C-REACTIVE PROTEIN: CPT

## 2018-10-04 PROCEDURE — 85025 COMPLETE CBC W/AUTO DIFF WBC: CPT

## 2018-10-04 PROCEDURE — 80048 BASIC METABOLIC PNL TOTAL CA: CPT

## 2018-10-04 PROCEDURE — 86480 TB TEST CELL IMMUN MEASURE: CPT

## 2018-10-04 PROCEDURE — 36415 COLL VENOUS BLD VENIPUNCTURE: CPT

## 2018-10-04 PROCEDURE — 99213 OFFICE O/P EST LOW 20 MIN: CPT | Performed by: FAMILY MEDICINE

## 2018-10-04 PROCEDURE — 80076 HEPATIC FUNCTION PANEL: CPT

## 2018-10-04 RX ORDER — PREDNISONE 1 MG/1
10 TABLET ORAL DAILY
COMMUNITY
Start: 2018-07-24

## 2018-10-04 NOTE — PROGRESS NOTES
Assessment/Plan:  Chief Complaint   Patient presents with    Nail Problem     possible nail fungus of the R first finger and spreading to other fingers  All toenails are affected also  Patient Sumi Mcallister Odor rec  by Dr Salina Diaz hand specialist        No problem-specific Assessment & Plan notes found for this encounter  Diagnoses and all orders for this visit:    Need for influenza vaccination  -     influenza vaccine, 4670-6066, quadrivalent, recombinant, PF, 0 5 mL, for patients 18 yr+ (FLUBLOK)    Onychomycosis          Subjective:      Patient ID: Evangelina Shine is a 58 y o  male  Here for Nail Problem (possible nail fungus of the R first finger and spreading to other fingers  All toenails are affected also  ) pt  Will have varicose vein surgery 12/21/18         The following portions of the patient's history were reviewed and updated as appropriate: allergies, current medications, past family history, past medical history, past social history, past surgical history and problem list     Review of Systems   Constitutional: Negative  HENT: Negative  Eyes: Negative  Respiratory: Negative  Cardiovascular: Negative  Gastrointestinal: Negative  Endocrine: Negative  Genitourinary: Negative  Musculoskeletal:        Varicose veins left leg   Skin: Negative  Onychomycosis all nails     Allergic/Immunologic: Negative  Neurological: Negative  Hematological: Negative  Psychiatric/Behavioral: Negative  Objective:      /86   Ht 5' 10" (1 778 m)   Wt 76 1 kg (167 lb 12 8 oz)   BMI 24 08 kg/m²          Physical Exam   Constitutional: He is oriented to person, place, and time  He appears well-developed and well-nourished  HENT:   Head: Normocephalic and atraumatic     Right Ear: External ear normal    Left Ear: External ear normal    Nose: Nose normal    Mouth/Throat: Oropharynx is clear and moist    Eyes: Pupils are equal, round, and reactive to light  Conjunctivae and EOM are normal    Neck: Normal range of motion  Neck supple  Cardiovascular: Normal rate, regular rhythm, normal heart sounds and intact distal pulses  Pulmonary/Chest: Effort normal and breath sounds normal    Musculoskeletal: Normal range of motion  Varicose vein left leg   Neurological: He is alert and oriented to person, place, and time  He has normal reflexes  Skin: Skin is warm and dry  Onychomycosis all nails in feet and right thumb and index finger   Psychiatric: He has a normal mood and affect   His behavior is normal

## 2018-10-04 NOTE — PATIENT INSTRUCTIONS
Sharon Mcallister Odor rec  by Dr Slaina Diaz hand specialist  Flu shot today  Get vascular surgery done as directed for left leg varicose vein

## 2018-10-08 LAB
GAMMA INTERFERON BACKGROUND BLD IA-ACNC: 0.53 IU/ML
M TB IFN-G BLD-IMP: POSITIVE
M TB IFN-G CD4+ BCKGRND COR BLD-ACNC: 0.69 IU/ML
M TB IFN-G CD4+ BCKGRND COR BLD-ACNC: 0.94 IU/ML
MITOGEN IGNF BCKGRD COR BLD-ACNC: >10 IU/ML

## 2018-10-16 ENCOUNTER — APPOINTMENT (OUTPATIENT)
Dept: RADIOLOGY | Age: 63
End: 2018-10-16
Payer: COMMERCIAL

## 2018-10-16 ENCOUNTER — TRANSCRIBE ORDERS (OUTPATIENT)
Dept: ADMINISTRATIVE | Age: 63
End: 2018-10-16

## 2018-10-16 DIAGNOSIS — M05.79 SEROPOSITIVE RHEUMATOID ARTHRITIS OF MULTIPLE SITES (HCC): Primary | ICD-10-CM

## 2018-10-16 DIAGNOSIS — R76.12 NONSPECIFIC REACTION TO CELL MEDIATED IMMUNITY MEASUREMENT OF GAMMA INTERFERON ANTIGEN RESPONSE WITHOUT ACTIVE TUBERCULOSIS: ICD-10-CM

## 2018-10-16 DIAGNOSIS — M05.79 SEROPOSITIVE RHEUMATOID ARTHRITIS OF MULTIPLE SITES (HCC): ICD-10-CM

## 2018-10-16 PROCEDURE — 71046 X-RAY EXAM CHEST 2 VIEWS: CPT

## 2018-10-23 RX ORDER — CLINDAMYCIN HYDROCHLORIDE 150 MG/1
CAPSULE ORAL
COMMUNITY
Start: 2018-10-18 | End: 2018-12-11

## 2018-10-24 ENCOUNTER — OFFICE VISIT (OUTPATIENT)
Dept: INFECTIOUS DISEASES | Facility: CLINIC | Age: 63
End: 2018-10-24
Payer: COMMERCIAL

## 2018-10-24 VITALS
DIASTOLIC BLOOD PRESSURE: 84 MMHG | RESPIRATION RATE: 20 BRPM | SYSTOLIC BLOOD PRESSURE: 164 MMHG | WEIGHT: 162 LBS | BODY MASS INDEX: 22.68 KG/M2 | TEMPERATURE: 97.8 F | HEART RATE: 48 BPM | HEIGHT: 71 IN

## 2018-10-24 DIAGNOSIS — G89.4 CHRONIC PAIN SYNDROME: ICD-10-CM

## 2018-10-24 DIAGNOSIS — R76.12 POSITIVE QUANTIFERON-TB GOLD TEST: Primary | ICD-10-CM

## 2018-10-24 DIAGNOSIS — M06.9 RHEUMATOID ARTHRITIS, INVOLVING UNSPECIFIED SITE, UNSPECIFIED RHEUMATOID FACTOR PRESENCE: ICD-10-CM

## 2018-10-24 DIAGNOSIS — A31.1 CUTANEOUS MYCOBACTERIUM MARINUM INFECTION: ICD-10-CM

## 2018-10-24 PROCEDURE — 99213 OFFICE O/P EST LOW 20 MIN: CPT | Performed by: INTERNAL MEDICINE

## 2018-10-24 RX ORDER — ORPHENADRINE CITRATE 100 MG/1
100 TABLET, EXTENDED RELEASE ORAL 3 TIMES DAILY
COMMUNITY
End: 2018-11-30 | Stop reason: SDUPTHER

## 2018-10-24 NOTE — PROGRESS NOTES
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  Status post I&D, prolonged antibiotics  Clinically resolved  Rec:  · No further treatment indicated    3   RA on Actemra    RTO PRN  Subjective:  Patient returns for repeat positive quantiferon Gold  Seen by me last year for positive test   No TB risk factors at that time  No symptoms at that time and CXR negative  Repeat test negative so no treatment suggested  Had repeat test this year which is now positive again  From review of my last note in 2017 it does not appear that he revealed his prior history of M  Marinum infection of his hands related to a fish tank exposure several years ago  He was seen by ortho at that time and underwent I&D and was treated with a prolonged course of therapy with clinical resolution  No current hand swelling or redness  No cough  Denies fevers, chills, or sweats  Denies nausea, vomiting, or diarrhea        The following portions of the patient's history were reviewed and updated as appropriate: allergies, current medications, past medical history, past social history, past surgical history and problem list     Objective:  Vitals:  /84   Pulse (!) 48   Temp 97 8 °F (36 6 °C) (Tympanic)   Resp 20   Ht 5' 11" (1 803 m)   Wt 73 5 kg (162 lb)   BMI 22 59 kg/m²     Physical Exam:   General:  No acute distress  Psychiatric:  Awake and alert  Pulmonary:  Normal respiratory excursion without accessory muscle use  Abdomen:  Soft, nontender  Extremities:  No edema  Skin:  No rashes    Lab Results:  I have personally reviewed pertinent labs  Imaging Studies:   I have personally reviewed pertinent imaging study reports and images in PACS  EKG, Pathology, and Other Studies:   I have personally reviewed pertinent reports

## 2018-11-15 DIAGNOSIS — K21.9 GASTROESOPHAGEAL REFLUX DISEASE WITHOUT ESOPHAGITIS: ICD-10-CM

## 2018-11-15 RX ORDER — ESOMEPRAZOLE MAGNESIUM 40 MG/1
CAPSULE, DELAYED RELEASE ORAL
Qty: 90 CAPSULE | Refills: 0 | Status: SHIPPED | OUTPATIENT
Start: 2018-11-15 | End: 2019-02-13 | Stop reason: SDUPTHER

## 2018-11-16 ENCOUNTER — TELEPHONE (OUTPATIENT)
Dept: INFECTIOUS DISEASES | Facility: CLINIC | Age: 63
End: 2018-11-16

## 2018-11-16 NOTE — TELEPHONE ENCOUNTER
Patient wanted to make an appointment for fingernail mold  I told him that usually issues within the nails are dealt with by derm, he insisted that he needed to see Dr Floresita Gilbert  When I asked who was referring him to us he said no one  I told the patient that we need documented results showing what he has before we can get him scheduled with any of our physicians since the issue is different than what he was seen for in the past  He agreed that he would have his physician fax records over stating that she wants him seen in our office

## 2018-11-23 DIAGNOSIS — I83.892 VARICOSE VEINS OF LOWER EXTREMITIES WITH COMPLICATIONS, LEFT: Primary | ICD-10-CM

## 2018-11-30 DIAGNOSIS — M62.838 MUSCLE SPASM: Primary | ICD-10-CM

## 2018-11-30 RX ORDER — ORPHENADRINE CITRATE 100 MG/1
100 TABLET, EXTENDED RELEASE ORAL 3 TIMES DAILY
Qty: 42 TABLET | Refills: 0 | Status: SHIPPED | OUTPATIENT
Start: 2018-11-30 | End: 2019-02-25 | Stop reason: SDUPTHER

## 2018-11-30 RX ORDER — ORPHENADRINE CITRATE 100 MG/1
100 TABLET, EXTENDED RELEASE ORAL 3 TIMES DAILY
Qty: 270 TABLET | Refills: 0 | Status: SHIPPED | OUTPATIENT
Start: 2018-11-30 | End: 2018-12-11

## 2018-11-30 NOTE — TELEPHONE ENCOUNTER
Wife called the script line asking for a 2 week supply and a 90 day supply to be sent to the pharmacies since his mail away pharmacy did not send us a request in time    Please double check scripts and approve

## 2018-12-03 ENCOUNTER — APPOINTMENT (OUTPATIENT)
Dept: LAB | Facility: CLINIC | Age: 63
End: 2018-12-03
Payer: COMMERCIAL

## 2018-12-03 ENCOUNTER — OFFICE VISIT (OUTPATIENT)
Dept: FAMILY MEDICINE CLINIC | Facility: CLINIC | Age: 63
End: 2018-12-03
Payer: COMMERCIAL

## 2018-12-03 VITALS
DIASTOLIC BLOOD PRESSURE: 70 MMHG | BODY MASS INDEX: 23.27 KG/M2 | WEIGHT: 166.2 LBS | SYSTOLIC BLOOD PRESSURE: 136 MMHG | HEIGHT: 71 IN

## 2018-12-03 DIAGNOSIS — G25.81 RLS (RESTLESS LEGS SYNDROME): ICD-10-CM

## 2018-12-03 DIAGNOSIS — M10.9 GOUT, UNSPECIFIED CAUSE, UNSPECIFIED CHRONICITY, UNSPECIFIED SITE: ICD-10-CM

## 2018-12-03 DIAGNOSIS — Z00.00 HEALTH CARE MAINTENANCE: ICD-10-CM

## 2018-12-03 DIAGNOSIS — Z12.5 SCREENING FOR PROSTATE CANCER: ICD-10-CM

## 2018-12-03 DIAGNOSIS — M06.9 RHEUMATOID ARTHRITIS, INVOLVING UNSPECIFIED SITE, UNSPECIFIED RHEUMATOID FACTOR PRESENCE: ICD-10-CM

## 2018-12-03 DIAGNOSIS — G47.30 SLEEP APNEA, UNSPECIFIED TYPE: ICD-10-CM

## 2018-12-03 DIAGNOSIS — I83.892 VARICOSE VEINS OF LEFT LOWER EXTREMITY WITH COMPLICATIONS: Primary | ICD-10-CM

## 2018-12-03 DIAGNOSIS — M05.79 SEROPOSITIVE RHEUMATOID ARTHRITIS OF MULTIPLE SITES (HCC): Primary | ICD-10-CM

## 2018-12-03 DIAGNOSIS — K21.9 GASTROESOPHAGEAL REFLUX DISEASE, ESOPHAGITIS PRESENCE NOT SPECIFIED: ICD-10-CM

## 2018-12-03 DIAGNOSIS — I83.892 VARICOSE VEINS OF LEFT LOWER EXTREMITY WITH COMPLICATIONS: ICD-10-CM

## 2018-12-03 DIAGNOSIS — Z01.818 PREOP EXAMINATION: ICD-10-CM

## 2018-12-03 DIAGNOSIS — G47.00 INSOMNIA, UNSPECIFIED TYPE: ICD-10-CM

## 2018-12-03 DIAGNOSIS — Z79.899 NEED FOR PROPHYLACTIC CHEMOTHERAPY: ICD-10-CM

## 2018-12-03 DIAGNOSIS — G89.4 CHRONIC PAIN SYNDROME: ICD-10-CM

## 2018-12-03 DIAGNOSIS — K21.9 GASTROESOPHAGEAL REFLUX DISEASE WITHOUT ESOPHAGITIS: ICD-10-CM

## 2018-12-03 LAB
BASOPHILS # BLD AUTO: 0.04 THOUSANDS/ΜL (ref 0–0.1)
BASOPHILS NFR BLD AUTO: 1 % (ref 0–1)
EOSINOPHIL # BLD AUTO: 0.12 THOUSAND/ΜL (ref 0–0.61)
EOSINOPHIL NFR BLD AUTO: 2 % (ref 0–6)
ERYTHROCYTE [DISTWIDTH] IN BLOOD BY AUTOMATED COUNT: 11.9 % (ref 11.6–15.1)
ERYTHROCYTE [SEDIMENTATION RATE] IN BLOOD: 3 MM/HOUR (ref 0–10)
HCT VFR BLD AUTO: 46.5 % (ref 36.5–49.3)
HGB BLD-MCNC: 15.6 G/DL (ref 12–17)
IMM GRANULOCYTES # BLD AUTO: 0.02 THOUSAND/UL (ref 0–0.2)
IMM GRANULOCYTES NFR BLD AUTO: 0 % (ref 0–2)
LYMPHOCYTES # BLD AUTO: 1.31 THOUSANDS/ΜL (ref 0.6–4.47)
LYMPHOCYTES NFR BLD AUTO: 20 % (ref 14–44)
MCH RBC QN AUTO: 34.4 PG (ref 26.8–34.3)
MCHC RBC AUTO-ENTMCNC: 33.5 G/DL (ref 31.4–37.4)
MCV RBC AUTO: 103 FL (ref 82–98)
MONOCYTES # BLD AUTO: 0.65 THOUSAND/ΜL (ref 0.17–1.22)
MONOCYTES NFR BLD AUTO: 10 % (ref 4–12)
NEUTROPHILS # BLD AUTO: 4.4 THOUSANDS/ΜL (ref 1.85–7.62)
NEUTS SEG NFR BLD AUTO: 67 % (ref 43–75)
NRBC BLD AUTO-RTO: 0 /100 WBCS
PLATELET # BLD AUTO: 191 THOUSANDS/UL (ref 149–390)
PMV BLD AUTO: 10.1 FL (ref 8.9–12.7)
RBC # BLD AUTO: 4.53 MILLION/UL (ref 3.88–5.62)
WBC # BLD AUTO: 6.54 THOUSAND/UL (ref 4.31–10.16)

## 2018-12-03 PROCEDURE — 80061 LIPID PANEL: CPT

## 2018-12-03 PROCEDURE — 84153 ASSAY OF PSA TOTAL: CPT

## 2018-12-03 PROCEDURE — 80053 COMPREHEN METABOLIC PANEL: CPT

## 2018-12-03 PROCEDURE — 84154 ASSAY OF PSA FREE: CPT

## 2018-12-03 PROCEDURE — 86140 C-REACTIVE PROTEIN: CPT

## 2018-12-03 PROCEDURE — 99242 OFF/OP CONSLTJ NEW/EST SF 20: CPT | Performed by: FAMILY MEDICINE

## 2018-12-03 PROCEDURE — 85025 COMPLETE CBC W/AUTO DIFF WBC: CPT

## 2018-12-03 PROCEDURE — 82248 BILIRUBIN DIRECT: CPT

## 2018-12-03 PROCEDURE — 36415 COLL VENOUS BLD VENIPUNCTURE: CPT

## 2018-12-03 PROCEDURE — 85652 RBC SED RATE AUTOMATED: CPT

## 2018-12-03 NOTE — PROGRESS NOTES
Assessment/Plan:  Chief Complaint   Patient presents with    Pre-op Exam     having vascular surgery of the L leg on 12/14/18 by Dr Duke Ramirez  Patient Instructions   Pt  Is here for preop for left leg varicose vein surgery, pt  Is to be medically cleared for surgery on 12/14/18 by Dr Duke Ramirez at [facility] - awaiting labs for PAT  He should f-up with dermatologist for nails and fungal and mold infectios and Rheumatology for RA  and ID as directed  He should take all meds as directed  Call if any problems  We discussed chronic opioid use for pain and also need to follow up as directed for general PE once yearly  [place] is stable and take gerd med Nexium 40 mg prn as directed  Flu shot is utd  Get labs and EKG as directed for PAT's today  Addendum 12/13/18: Pt  Is medically cleared for surgery on 12/14/18 by Dr Duke Ramirez for left leg varicose vein surgery at [facility], PAT's reviewed  No problem-specific Assessment & Plan notes found for this encounter  Diagnoses and all orders for this visit:    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    Preop examination    Rheumatoid arthritis, involving unspecified site, unspecified rheumatoid factor presence (HCC)    Gastroesophageal reflux disease, esophagitis presence not specified    Other orders  -     Discontinue: ciclopirox (PENLAC) 8 % solution; APPLY ON THE SKIN DAILY (TO THE NAIL)          Subjective:      Patient ID: Fifi Dunn is a 58 y o  male  Pre-op Exam (having vascular surgery of the L leg on 12/14/18 by Dr Duke Ramirez ) Hx of RA  Takes pain meds for this Saw Dermatology for fungal infection and mold in fingernails and also toenails, pt  States he has appt  To see ID in January 2019  He does see Rheumatology as directed for RA           The following portions of the patient's history were reviewed and updated as appropriate: allergies, current medications, past family history, past medical history, past social history, past surgical history and problem list     Review of Systems   Constitutional: Negative  HENT: Negative  Eyes: Negative  Respiratory: Negative  Cardiovascular: Negative  Gastrointestinal: Negative  Endocrine: Negative  Genitourinary: Negative  Musculoskeletal:        Rheumatoid arthritis - feet and elbows, shoulders and low back    Skin:        Toenail and fingernails with fungus and mold as per patient  Allergic/Immunologic: Negative  Neurological: Negative  Hematological: Negative  Psychiatric/Behavioral: Negative  Objective:      /70   Ht 5' 11" (1 803 m)   Wt 75 4 kg (166 lb 3 2 oz)   BMI 23 18 kg/m²          Physical Exam   Constitutional: He is oriented to person, place, and time  He appears well-developed and well-nourished  HENT:   Head: Normocephalic and atraumatic  Right Ear: External ear normal    Left Ear: External ear normal    Nose: Nose normal    Mouth/Throat: Oropharynx is clear and moist    Eyes: Pupils are equal, round, and reactive to light  Conjunctivae and EOM are normal    Neck: Normal range of motion  Neck supple  Cardiovascular: Normal rate, regular rhythm, normal heart sounds and intact distal pulses  Pulmonary/Chest: Effort normal and breath sounds normal    Abdominal: Soft  Bowel sounds are normal    Musculoskeletal: Normal range of motion  Rheumatoid arthritis in low back and elbows and feet and shoulders   Neurological: He is alert and oriented to person, place, and time  He has normal reflexes  Skin: Skin is warm and dry  Varicose veins left lower extremity, mold and fungal infection in fingernails and toenails  Psychiatric: He has a normal mood and affect   His behavior is normal

## 2018-12-03 NOTE — PATIENT INSTRUCTIONS
Pt  Is here for preop for left leg varicose vein surgery, pt  Is to be medically cleared for surgery on 12/14/18 by Dr Dede Diallo at [facility] - awaiting labs for PAT  He should f-up with dermatologist for nails and fungal and mold infectios and Rheumatology for RA  and ID as directed  He should take all meds as directed  Call if any problems  We discussed chronic opioid use for pain and also need to follow up as directed for general PE once yearly  Carli Caryor is stable and take gerd med Nexium 40 mg prn as directed  Flu shot is utd  Get labs and EKG as directed for PAT's today  Addendum 12/13/18: Pt  Is medically cleared for surgery on 12/14/18 by Dr Dede Diallo for left leg varicose vein surgery at [facility], PAT's reviewed

## 2018-12-04 LAB
ALBUMIN SERPL BCP-MCNC: 4.2 G/DL (ref 3.5–5)
ALP SERPL-CCNC: 69 U/L (ref 46–116)
ALT SERPL W P-5'-P-CCNC: 40 U/L (ref 12–78)
ANION GAP SERPL CALCULATED.3IONS-SCNC: 7 MMOL/L (ref 4–13)
AST SERPL W P-5'-P-CCNC: 23 U/L (ref 5–45)
BILIRUB DIRECT SERPL-MCNC: 0.17 MG/DL (ref 0–0.2)
BILIRUB SERPL-MCNC: 0.56 MG/DL (ref 0.2–1)
BUN SERPL-MCNC: 18 MG/DL (ref 5–25)
CALCIUM SERPL-MCNC: 9.2 MG/DL (ref 8.3–10.1)
CHLORIDE SERPL-SCNC: 101 MMOL/L (ref 100–108)
CHOLEST SERPL-MCNC: 206 MG/DL (ref 50–200)
CO2 SERPL-SCNC: 29 MMOL/L (ref 21–32)
CREAT SERPL-MCNC: 0.75 MG/DL (ref 0.6–1.3)
CRP SERPL QL: <3 MG/L
GFR SERPL CREATININE-BSD FRML MDRD: 97 ML/MIN/1.73SQ M
GLUCOSE P FAST SERPL-MCNC: 100 MG/DL (ref 65–99)
HDLC SERPL-MCNC: 61 MG/DL (ref 40–60)
LDLC SERPL CALC-MCNC: 131 MG/DL (ref 0–100)
NONHDLC SERPL-MCNC: 145 MG/DL
POTASSIUM SERPL-SCNC: 4.7 MMOL/L (ref 3.5–5.3)
PROT SERPL-MCNC: 7.1 G/DL (ref 6.4–8.2)
PSA FREE MFR SERPL: 40 %
PSA FREE SERPL-MCNC: 0.08 NG/ML
PSA SERPL-MCNC: 0.2 NG/ML (ref 0–4)
SODIUM SERPL-SCNC: 137 MMOL/L (ref 136–145)
TRIGL SERPL-MCNC: 69 MG/DL

## 2018-12-11 ENCOUNTER — OFFICE VISIT (OUTPATIENT)
Dept: LAB | Age: 63
End: 2018-12-11
Payer: COMMERCIAL

## 2018-12-11 DIAGNOSIS — I83.892 VARICOSE VEINS OF LOWER EXTREMITIES WITH COMPLICATIONS, LEFT: ICD-10-CM

## 2018-12-11 PROCEDURE — 93005 ELECTROCARDIOGRAM TRACING: CPT

## 2018-12-11 NOTE — PRE-PROCEDURE INSTRUCTIONS
Pre-Surgery Instructions:   Medication Instructions    B-COMPLEX-C PO Patient was instructed by Physician and understands   Calcium 250 MG CAPS Patient was instructed by Physician and understands   celecoxib (CeleBREX) 200 mg capsule Patient was instructed by Physician and understands   cyanocobalamin (VITAMIN B-12) 100 mcg tablet Patient was instructed by Physician and understands   DULoxetine (CYMBALTA) 30 mg delayed release capsule Instructed patient per Anesthesia Guidelines   esomeprazole (NexIUM) 40 MG capsule Instructed patient per Anesthesia Guidelines   Fexofenadine HCl (ALLERGY 24-HR PO) Instructed patient per Anesthesia Guidelines   gabapentin (NEURONTIN) 600 MG tablet Instructed patient per Anesthesia Guidelines   glucosamine-chondroitin 500-400 MG tablet Patient was instructed by Physician and understands   Magnesium 250 MG TABS Patient was instructed by Physician and understands   milk thistle 175 MG tablet Patient was instructed by Physician and understands   OIL OF OREGANO PO Patient was instructed by Physician and understands   orphenadrine (NORFLEX) 100 mg tablet Instructed patient per Anesthesia Guidelines   oxyCODONE (ROXICODONE) 10 MG TABS Instructed patient per Anesthesia Guidelines   Potassium Citrate GRAN Patient was instructed by Physician and understands   predniSONE 5 mg tablet Instructed patient per Anesthesia Guidelines   TURMERIC CURCUMIN PO Patient was instructed by Physician and understands   Zinc Sulfate (ZINC 15 PO) Patient was instructed by Physician and understands   zolpidem (AMBIEN) 10 mg tablet Instructed patient per Anesthesia Guidelines  Pre op and bathing instructions reviewed   Pt has hibiclens

## 2018-12-12 LAB
ATRIAL RATE: 119 BPM
QRS AXIS: -43 DEGREES
QRSD INTERVAL: 108 MS
QT INTERVAL: 316 MS
QTC INTERVAL: 453 MS
T WAVE AXIS: 94 DEGREES
VENTRICULAR RATE: 124 BPM

## 2018-12-12 PROCEDURE — 93010 ELECTROCARDIOGRAM REPORT: CPT | Performed by: INTERNAL MEDICINE

## 2018-12-13 ENCOUNTER — TELEPHONE (OUTPATIENT)
Dept: FAMILY MEDICINE CLINIC | Facility: CLINIC | Age: 63
End: 2018-12-13

## 2018-12-13 NOTE — TELEPHONE ENCOUNTER
Pt  Is medically cleared an now in chart  Addendum now in chart  It is now signed and please notify Vascular center of med clearance

## 2018-12-13 NOTE — TELEPHONE ENCOUNTER
Vascular center called - They are asking if Kanu's H&P from 12/3/18 can please be signed ASAP  His surgery is scheduled for tomorrow  His EKG and PAT BW is completed and resulted in his chart

## 2018-12-14 ENCOUNTER — HOSPITAL ENCOUNTER (OUTPATIENT)
Facility: AMBULARY SURGERY CENTER | Age: 63
Setting detail: OUTPATIENT SURGERY
Discharge: HOME | End: 2018-12-14
Attending: SURGERY | Admitting: SURGERY
Payer: COMMERCIAL

## 2018-12-14 ENCOUNTER — APPOINTMENT (OUTPATIENT)
Dept: ULTRASOUND IMAGING | Facility: AMBULARY SURGERY CENTER | Age: 63
End: 2018-12-14
Payer: COMMERCIAL

## 2018-12-14 VITALS
RESPIRATION RATE: 18 BRPM | DIASTOLIC BLOOD PRESSURE: 77 MMHG | WEIGHT: 165.13 LBS | HEIGHT: 71 IN | BODY MASS INDEX: 23.12 KG/M2 | TEMPERATURE: 97.3 F | SYSTOLIC BLOOD PRESSURE: 138 MMHG | OXYGEN SATURATION: 100 % | HEART RATE: 72 BPM

## 2018-12-14 DIAGNOSIS — I83.892 VARICOSE VEINS OF LEFT LOWER EXTREMITY WITH COMPLICATIONS: Primary | ICD-10-CM

## 2018-12-14 PROCEDURE — 37799 UNLISTED PX VASCULAR SURGERY: CPT | Performed by: SURGERY

## 2018-12-14 RX ORDER — KETOROLAC TROMETHAMINE 30 MG/ML
15 INJECTION, SOLUTION INTRAMUSCULAR; INTRAVENOUS ONCE
Status: COMPLETED | OUTPATIENT
Start: 2018-12-14 | End: 2018-12-14

## 2018-12-14 RX ORDER — ROCURONIUM BROMIDE 10 MG/ML
INJECTION, SOLUTION INTRAVENOUS AS NEEDED
Status: DISCONTINUED | OUTPATIENT
Start: 2018-12-14 | End: 2018-12-14 | Stop reason: SURG

## 2018-12-14 RX ORDER — GLYCOPYRROLATE 0.2 MG/ML
INJECTION INTRAMUSCULAR; INTRAVENOUS AS NEEDED
Status: DISCONTINUED | OUTPATIENT
Start: 2018-12-14 | End: 2018-12-14 | Stop reason: SURG

## 2018-12-14 RX ORDER — SODIUM CHLORIDE 9 MG/ML
125 INJECTION, SOLUTION INTRAVENOUS CONTINUOUS
Status: DISCONTINUED | OUTPATIENT
Start: 2018-12-14 | End: 2018-12-14 | Stop reason: HOSPADM

## 2018-12-14 RX ORDER — CEFAZOLIN SODIUM 1 G/3ML
INJECTION, POWDER, FOR SOLUTION INTRAMUSCULAR; INTRAVENOUS AS NEEDED
Status: DISCONTINUED | OUTPATIENT
Start: 2018-12-14 | End: 2018-12-14 | Stop reason: SURG

## 2018-12-14 RX ORDER — ONDANSETRON 2 MG/ML
INJECTION INTRAMUSCULAR; INTRAVENOUS AS NEEDED
Status: DISCONTINUED | OUTPATIENT
Start: 2018-12-14 | End: 2018-12-14 | Stop reason: SURG

## 2018-12-14 RX ORDER — OXYCODONE HYDROCHLORIDE 5 MG/1
5 TABLET ORAL EVERY 4 HOURS PRN
Qty: 20 TABLET | Refills: 0 | Status: SHIPPED | OUTPATIENT
Start: 2018-12-14 | End: 2018-12-24

## 2018-12-14 RX ORDER — FENTANYL CITRATE/PF 50 MCG/ML
25 SYRINGE (ML) INJECTION
Status: COMPLETED | OUTPATIENT
Start: 2018-12-14 | End: 2018-12-14

## 2018-12-14 RX ORDER — EPHEDRINE SULFATE 50 MG/ML
INJECTION, SOLUTION INTRAVENOUS AS NEEDED
Status: DISCONTINUED | OUTPATIENT
Start: 2018-12-14 | End: 2018-12-14 | Stop reason: SURG

## 2018-12-14 RX ORDER — FENTANYL CITRATE 50 UG/ML
INJECTION, SOLUTION INTRAMUSCULAR; INTRAVENOUS AS NEEDED
Status: DISCONTINUED | OUTPATIENT
Start: 2018-12-14 | End: 2018-12-14 | Stop reason: SURG

## 2018-12-14 RX ORDER — LIDOCAINE HYDROCHLORIDE 10 MG/ML
INJECTION, SOLUTION INFILTRATION; PERINEURAL AS NEEDED
Status: DISCONTINUED | OUTPATIENT
Start: 2018-12-14 | End: 2018-12-14 | Stop reason: SURG

## 2018-12-14 RX ORDER — PROPOFOL 10 MG/ML
INJECTION, EMULSION INTRAVENOUS AS NEEDED
Status: DISCONTINUED | OUTPATIENT
Start: 2018-12-14 | End: 2018-12-14 | Stop reason: SURG

## 2018-12-14 RX ORDER — MAGNESIUM HYDROXIDE 1200 MG/15ML
LIQUID ORAL AS NEEDED
Status: DISCONTINUED | OUTPATIENT
Start: 2018-12-14 | End: 2018-12-14 | Stop reason: HOSPADM

## 2018-12-14 RX ORDER — ONDANSETRON 2 MG/ML
4 INJECTION INTRAMUSCULAR; INTRAVENOUS ONCE AS NEEDED
Status: DISCONTINUED | OUTPATIENT
Start: 2018-12-14 | End: 2018-12-14 | Stop reason: HOSPADM

## 2018-12-14 RX ORDER — NEOSTIGMINE METHYLSULFATE 1 MG/ML
INJECTION INTRAVENOUS AS NEEDED
Status: DISCONTINUED | OUTPATIENT
Start: 2018-12-14 | End: 2018-12-14 | Stop reason: SURG

## 2018-12-14 RX ADMIN — PROPOFOL 200 MG: 10 INJECTION, EMULSION INTRAVENOUS at 11:09

## 2018-12-14 RX ADMIN — FENTANYL CITRATE 25 MCG: 50 INJECTION INTRAMUSCULAR; INTRAVENOUS at 12:39

## 2018-12-14 RX ADMIN — FENTANYL CITRATE 25 MCG: 50 INJECTION INTRAMUSCULAR; INTRAVENOUS at 12:33

## 2018-12-14 RX ADMIN — ONDANSETRON 4 MG: 2 INJECTION INTRAMUSCULAR; INTRAVENOUS at 12:10

## 2018-12-14 RX ADMIN — NEOSTIGMINE METHYLSULFATE 2 MG: 1 INJECTION INTRAVENOUS at 12:16

## 2018-12-14 RX ADMIN — GLYCOPYRROLATE 0.4 MG: 0.2 INJECTION, SOLUTION INTRAMUSCULAR; INTRAVENOUS at 12:16

## 2018-12-14 RX ADMIN — SODIUM CHLORIDE: 0.9 INJECTION, SOLUTION INTRAVENOUS at 11:05

## 2018-12-14 RX ADMIN — FENTANYL CITRATE 100 MCG: 50 INJECTION, SOLUTION INTRAMUSCULAR; INTRAVENOUS at 12:22

## 2018-12-14 RX ADMIN — FENTANYL CITRATE 25 MCG: 50 INJECTION INTRAMUSCULAR; INTRAVENOUS at 12:52

## 2018-12-14 RX ADMIN — CEFAZOLIN SODIUM 2000 MG: 1 INJECTION, POWDER, FOR SOLUTION INTRAMUSCULAR; INTRAVENOUS at 11:24

## 2018-12-14 RX ADMIN — EPHEDRINE SULFATE 10 MG: 50 INJECTION, SOLUTION INTRAMUSCULAR; INTRAVENOUS; SUBCUTANEOUS at 11:28

## 2018-12-14 RX ADMIN — KETOROLAC TROMETHAMINE 15 MG: 30 INJECTION, SOLUTION INTRAMUSCULAR at 12:57

## 2018-12-14 RX ADMIN — FENTANYL CITRATE 25 MCG: 50 INJECTION INTRAMUSCULAR; INTRAVENOUS at 12:45

## 2018-12-14 RX ADMIN — ROCURONIUM BROMIDE 30 MG: 10 INJECTION INTRAVENOUS at 11:09

## 2018-12-14 RX ADMIN — HYDROCORTISONE SODIUM SUCCINATE 100 MG: 100 INJECTION, POWDER, FOR SOLUTION INTRAMUSCULAR; INTRAVENOUS at 11:32

## 2018-12-14 RX ADMIN — LIDOCAINE HYDROCHLORIDE ANHYDROUS 50 MG: 10 INJECTION, SOLUTION INFILTRATION at 11:09

## 2018-12-14 RX ADMIN — ROCURONIUM BROMIDE 20 MG: 10 INJECTION INTRAVENOUS at 11:49

## 2018-12-14 RX ADMIN — EPHEDRINE SULFATE 15 MG: 50 INJECTION, SOLUTION INTRAMUSCULAR; INTRAVENOUS; SUBCUTANEOUS at 11:34

## 2018-12-14 NOTE — ANESTHESIA POSTPROCEDURE EVALUATION
Post-Op Assessment Note      CV Status:  Stable    Mental Status:  Alert and awake    Hydration Status:  Euvolemic    PONV Controlled:  Controlled    Airway Patency:  Patent    Post Op Vitals Reviewed: Yes          Staff: CRNA           BP   137/76   Temp (!) 97 °F (36 1 °C) (12/14/18 1226)    Pulse  73   Resp      SpO2   97

## 2018-12-14 NOTE — OP NOTE
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 [D56 643]    Procedure(s) (LRB):  LOWER EXTREMITY MICRO PHLEBECTOMIES (Left)    Specimen(s):  * No specimens in log *  IV fluids: 900 mL  Estimated Blood Loss:   Minimal    Drains: N/A       Anesthesia Type:   General    Operative Indications:  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 [J38 924]  Thiago Campbell is a pleasant 77-year-old gentleman who presented to the office with complaints of a painful large truncal varicosity along the left posterior knee in addition to numerous additional varicosities along the thigh and lower leg associated with pain and pruritus  Due to the location of the posterior varicosity cause significant discomfort  He underwent a venous reflux study which suggested competence of the greater saphenous vein  He underwent a trial of compression stockings however his symptoms persisted  I recommended left lower extremity microphlebectomy of the painful varicosities  The procedure, risks, benefits, alternatives, and anticipated postop course were discussed in detail  All questions were answered to his satisfaction  Patient was agreeable to proceed  Written consent was obtained  Patient brought to the [facility] for the above-mentioned procedure  Operative Findings: Total 9 phlebectomy incision left posterior knee/calf  Complications:   None    Procedure and Technique:  The patient was properly identified in the preop holding area  Patient's name, laterality and nature procedure verified  The operative site was marked  Patient was brought to the operating room  After adequate induction general anesthesia patient was intubated without difficulty  Patient was subsequently positioned prone on the OR table with all bony prominences and pressure areas appropriately padded and protected  Left lower extremity was prepped and draped in the usual fashion  An Ioban skin barrier was also placed  Preoperative dose of antibiotics was administered  A formal time-out was performed with the OR team and all were in agreement  Several  1-1 5cm skin incisions was carried out with a #15  Scalpel overlying the large posterior knee  varicosity  The vein was dissected free from its surrounding tissues  The vein was next clamped and divided  The small intervening vein segments were stripped  The transected ends were controlled with silk ties  The incisions were reapproximated using Monocryl suture  Additional small stab phlebectomy incisions were carried out with a 11  Scalpel  The varicosities were avulsed in the usual fashion  Hemostasis was obtained with manual pressure  Steri-Strips were applied to the incisions  The leg was dressed with 4 x 4 gauze, Kerlix, Ace wrap and Coban from the proximal calf to distal thigh  All needles, instruments, and sponge counts were reported correct  Patient was turned supine onto the stretcher  He was awakened, extubated and transferred to recovery room in stable condition       I was present for the entire procedure    Patient Disposition:  PACU     SIGNATURE: Jenn Gomez DO  DATE: December 14, 2018  TIME: 2:44 PM

## 2018-12-14 NOTE — INTERVAL H&P NOTE
H&P reviewed  After examining the patient I find no changes in the patients condition since the H&P had been written    Plan: Left lower extremity microphlebectomies (posterior knee)  Operative site marked

## 2018-12-14 NOTE — DISCHARGE INSTRUCTIONS
DISCHARGE INSTRUCTIONS  LEG SURGERY    Following discharge from the hospital, you may have some questions about your operation, your activities or your general condition  These instructions may answer some of your questions and help you adjust during the first few weeks following your operation  ACTIVITY:  Limit your physical activity to slow walking  Avoid climbing or heavy lifting for the first four weeks after surgery  Initially some discomfort will be felt when walking as the skin and muscle tissues heal  You may require help with walking or feel more secure with someone to lean on  Walking up steps and normal activities may be resumed, as you feel ready  You should not drive a car for one week following discharge from the hospital   You may ride in a car for short trips upon discharge  DIET:  Resume your normal diet  Try to eat low fat and low cholesterol foods  Good nutrition is important for healing of your incision  INCISION:  You may include the operated area in a shower on the third day following surgery  Wash your incision daily with soap and water  Pat the incision dry and leave open to air  Do not apply lotions, ointments, creams or powders to incision  Sitting in a tub is not recommended for the first two weeks following surgery or if you have any open wounds  It is normal to have swelling or discoloration around the incision  If increasing redness or pain develops, call our office immediately  Numbness in the region of the incision may occur following the surgery  This normally resolves in six to twelve months  If any of your incisions are open and require dressing changes, you will be given instruction for your daily incision care  If you are not able to change the dressings, a visiting nurse will be arranged  Your physician may have chosen to use a type of adhesive glue to close your incision  There are stitches present under the skin which will absorb on their own  The glue is used to cover the incision, assist in closure, and prevent contamination  This adhesive will darken and peel away on its own within one to two weeks  Alternatively, your surgeon may have chosen to use skin staples to close your incision which will be removed in the office at the time of your follow-up appointment  You may wash the incision with the staples present  LEG SWELLING: Most patients have noticeable leg swelling after leg surgery  This usually disappears within a few weeks  If swelling is present, elevate the leg whenever possible  Avoid sitting with the leg hanging down for prolonged time periods  Walking is beneficial   An ACE bandage or support stocking may be helpful, but this should be discussed with your physician prior to use  FOLLOW UP STUDIES:  Doppler ultrasound studies are very important to your post-operative care  Your surgeon will arrange for them at your first postoperative visit  Repeat studies are then scheduled every three months for the first year and periodically after this  PLEASE CALL THE OFFICE IF YOU HAVE ANY QUESTIONS    [phone] Kierra Sierra [phone] [facility] FREE [phone]  [address] [place] [address] , [address], [place], [address]  [facility] [address], [place], [address]  [facility] [address], [facility], [address]  [address] [facility] [address], [facility], [address] [facility]  [address], [address], [address]  [address], [address] [facility], [address] [facility] [address]   [address] [facility], [address] [facility], [address]  [address] [facility], [address] [facility], [facility], [address], Agustin Vergara 6

## 2018-12-14 NOTE — H&P (VIEW-ONLY)
Assessment/Plan:  Chief Complaint   Patient presents with    Pre-op Exam     having vascular surgery of the L leg on 12/14/18 by Dr Blessing Salazar  Patient Instructions   Pt  Is here for preop for left leg varicose vein surgery, pt  Is to be medically cleared for surgery on 12/14/18 by Dr Blessing Salazar at [facility] - awaiting labs for PAT  He should f-up with dermatologist for nails and fungal and mold infectios and Rheumatology for RA  and ID as directed  He should take all meds as directed  Call if any problems  We discussed chronic opioid use for pain and also need to follow up as directed for general PE once yearly  Ping Dan is stable and take gerd med Nexium 40 mg prn as directed  Flu shot is utd  Get labs and EKG as directed for PAT's today  Addendum 12/13/18: Pt  Is medically cleared for surgery on 12/14/18 by Dr lBessing Salazar for left leg varicose vein surgery at [facility], PAT's reviewed  No problem-specific Assessment & Plan notes found for this encounter  Diagnoses and all orders for this visit:    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    Preop examination    Rheumatoid arthritis, involving unspecified site, unspecified rheumatoid factor presence (HCC)    Gastroesophageal reflux disease, esophagitis presence not specified    Other orders  -     Discontinue: ciclopirox (PENLAC) 8 % solution; APPLY ON THE SKIN DAILY (TO THE NAIL)          Subjective:      Patient ID: Amaury Avery is a 58 y o  male  Pre-op Exam (having vascular surgery of the L leg on 12/14/18 by Dr Blessing Salazar ) Hx of RA  Takes pain meds for this Saw Dermatology for fungal infection and mold in fingernails and also toenails, pt  States he has appt  To see ID in January 2019  He does see Rheumatology as directed for RA           The following portions of the patient's history were reviewed and updated as appropriate: allergies, current medications, past family history, past medical history, past social history, past surgical history and problem list     Review of Systems   Constitutional: Negative  HENT: Negative  Eyes: Negative  Respiratory: Negative  Cardiovascular: Negative  Gastrointestinal: Negative  Endocrine: Negative  Genitourinary: Negative  Musculoskeletal:        Rheumatoid arthritis - feet and elbows, shoulders and low back    Skin:        Toenail and fingernails with fungus and mold as per patient  Allergic/Immunologic: Negative  Neurological: Negative  Hematological: Negative  Psychiatric/Behavioral: Negative  Objective:      /70   Ht 5' 11" (1 803 m)   Wt 75 4 kg (166 lb 3 2 oz)   BMI 23 18 kg/m²          Physical Exam   Constitutional: He is oriented to person, place, and time  He appears well-developed and well-nourished  HENT:   Head: Normocephalic and atraumatic  Right Ear: External ear normal    Left Ear: External ear normal    Nose: Nose normal    Mouth/Throat: Oropharynx is clear and moist    Eyes: Pupils are equal, round, and reactive to light  Conjunctivae and EOM are normal    Neck: Normal range of motion  Neck supple  Cardiovascular: Normal rate, regular rhythm, normal heart sounds and intact distal pulses  Pulmonary/Chest: Effort normal and breath sounds normal    Abdominal: Soft  Bowel sounds are normal    Musculoskeletal: Normal range of motion  Rheumatoid arthritis in low back and elbows and feet and shoulders   Neurological: He is alert and oriented to person, place, and time  He has normal reflexes  Skin: Skin is warm and dry  Varicose veins left lower extremity, mold and fungal infection in fingernails and toenails  Psychiatric: He has a normal mood and affect   His behavior is normal

## 2018-12-14 NOTE — ANESTHESIA PREPROCEDURE EVALUATION
Review of Systems/Medical History  Patient summary reviewed  Chart reviewed  No history of anesthetic complications     Cardiovascular  Negative cardio ROS    Pulmonary  Sleep apnea (s/p UPPP and mandibular advancement) ,        GI/Hepatic    GERD (active reflux) poorly controlled,        Negative  ROS        Endo/Other  Negative endo/other ROS      GYN       Hematology  Negative hematology ROS      Musculoskeletal  Rheumatoid arthritis (Chronic steroid use - prednisone 10 mg) ,   Comment: Right hip dislocations Arthritis     Neurology  Negative neurology ROS      Psychology   Anxiety, Depression ,   Chronic opioid dependence (40 mg oxycodone per day) Chronic pain,            Physical Exam    Airway  Comment: underbite  Mallampati score: I  TM Distance: >3 FB  Neck ROM: full     Dental   No notable dental hx     Cardiovascular  Comment: Negative ROS,     Pulmonary      Other Findings      Lab Results   Component Value Date    WBC 6 54 12/03/2018    HGB 15 6 12/03/2018     12/03/2018     Lab Results   Component Value Date     12/03/2018    K 4 7 12/03/2018    BUN 18 12/03/2018    CREATININE 0 75 12/03/2018    GLUCOSE 100 12/03/2018     Anesthesia Plan  ASA Score- 3     Anesthesia Type- general with ASA Monitors  Additional Monitors:   Airway Plan: ETT  Comment: Prone positioning, care with right hip  Plan Factors-    Induction- intravenous  Postoperative Plan-     Informed Consent- Anesthetic plan and risks discussed with patient and spouse  I personally reviewed this patient with the CRNA  Discussed and agreed on the Anesthesia Plan with the CRNA  Lupe Sanabria

## 2018-12-14 NOTE — INTERIM OP NOTE
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

## 2018-12-17 ENCOUNTER — OFFICE VISIT (OUTPATIENT)
Dept: VASCULAR SURGERY | Facility: CLINIC | Age: 63
End: 2018-12-17

## 2018-12-17 VITALS — RESPIRATION RATE: 18 BRPM | TEMPERATURE: 97.8 F | WEIGHT: 167 LBS | BODY MASS INDEX: 23.38 KG/M2 | HEIGHT: 71 IN

## 2018-12-17 DIAGNOSIS — G47.00 INSOMNIA, UNSPECIFIED TYPE: ICD-10-CM

## 2018-12-17 DIAGNOSIS — I83.892 VARICOSE VEINS OF LEFT LOWER EXTREMITY WITH COMPLICATIONS: Primary | ICD-10-CM

## 2018-12-17 PROCEDURE — 99024 POSTOP FOLLOW-UP VISIT: CPT | Performed by: NURSE PRACTITIONER

## 2018-12-17 RX ORDER — ZOLPIDEM TARTRATE 10 MG/1
10 TABLET ORAL
Qty: 30 TABLET | Refills: 0 | Status: SHIPPED | OUTPATIENT
Start: 2018-12-17 | End: 2019-02-07 | Stop reason: SDUPTHER

## 2018-12-17 NOTE — PATIENT INSTRUCTIONS
PLAN:  -Continue to observe incisions daily  If you notice open areas, drainage, pus, redness, or develop a fever, please call our office for evaluation  -wash incisions with soap and water, pat dry  No lotions or ointments to incisions  Let the steristrips fall off, or gently peel off if they are dangling   -Continue elevation throughout the day as able  -Wear compression stockings daily when able to do so comfortably  -Continue to exercise daily as tolerated; recommend working up to at least 30 minutes of walking per day  -Continue to follow a low sodium diet  -F/u with our office as needed  Varicose Veins   AMBULATORY CARE:   Varicose veins  are veins that become large, twisted, and swollen  They are common on the back of the calves, knees, and thighs  Varicose veins are caused by valves in your veins that do not work properly  This causes blood to collect and increase pressure in the veins of your legs  The increased pressure causes your veins to stretch, get larger, swell, and twist        Common symptoms include the following: Your symptoms may be worse after you stand or sit for long periods of time  You may have any of the following:  · Blue, purple, or bulging veins in your legs     · Pain, swelling, or muscle cramps in your legs    · Feeling of fatigue or heaviness in your legs    · Cramping in your legs  Seek care immediately if:   · You have a wound that does not heal or is infected  · You have an injury that has broken your skin and caused your varicose veins to bleed  · Your leg is swollen and hard  · You notice that your legs or feet are turning blue or black  · Your leg feels warm, tender, and painful  It may look swollen and red  Contact your healthcare provider if:   · You have pain in your leg that does not go away or gets worse  · You notice sudden large bruising on your legs  · You have a rash on your leg       · Your symptoms keep you from doing your daily activities  · You have questions or concerns about your condition or care  Treatment of varicose veins  aims to decrease symptoms, improve appearance, and prevent further problems  Treatment will depend on which veins are affected and how severe your condition is  You may need procedures to treat or remove your varicose veins  For example, your healthcare provider may inject a solution or use a laser to close the varicose veins  Surgery to remove long veins may also be done  Ask your healthcare provider for more information about procedures used to treat varicose veins  Manage varicose veins:   · Do not sit or stand for long periods of time  This can cause the blood to collect in your legs and make your symptoms worse  Bend or rotate your ankles several times every hour  Walk around for a few minutes every hour to get blood moving in your legs  · Do not cross your legs when you sit  This decreases blood flow to your feet and can make your symptoms worse  · Do not wear tight clothing or shoes  Do not wear high-heeled shoes  Do not wear clothes that are tight around the waist or knees  · Maintain a healthy weight  Being overweight or obese can make your varicose veins worse  Ask your healthcare provider how much you should weigh  Ask him or her to help you create a weight loss plan if you are overweight  · Wear pressure stockings as directed  The stockings are tight and put pressure on your legs  They improve blood flow and help prevent clots  · Elevate your legs  Keep them above the level of your heart for 15 to 30 minutes several times a day  You can also prop the end of your bed up slightly to elevate your legs while you sleep  This will help blood to flow back to your heart  · Get regular exercise  Talk to your healthcare provider about the best exercise plan for you  Exercise can improve blood flow to your legs and feet    Follow up with your healthcare provider as directed: Write down your questions so you remember to ask them during your visits  © 2017 [employer] Information is for End User's use only and may not be sold, redistributed or otherwise used for commercial purposes  All illustrations and images included in CareNotes® are the copyrighted property of [employer] M [employer]  or [employer]  The above information is an  only  It is not intended as medical advice for individual conditions or treatments  Talk to your doctor, nurse or pharmacist before following any medical regimen to see if it is safe and effective for you

## 2018-12-17 NOTE — PROGRESS NOTES
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  symptomatic left lower extremity varicose veins associated with erythema and itching now s/p Left leg stab phlebectomies by Dr Leah Frank 12/14/18    -left leg with dressing on arrival to office today; this was removed without issue  -left leg stab sites with steristrips intact; minimal swelling, no drainage, minimal ecchymosis    PLAN:  -Continue to observe incisions daily  If you notice open areas, drainage, pus, redness, or develop a fever, please call our office for evaluation  -wash incision daily with soap and water, pat dry  No lotion/ointments to sites  Let steristrips fall off   -Continue elevation throughout the day as able  -Wear compression stockings daily when able to do so comfortably  -Continue to exercise daily as tolerated; recommend working up to at least 30 minutes of walking per day  -Continue to follow a low sodium diet  -F/u with our office as needed         Diagnoses and all orders for this visit:    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          Subjective: "I am here to have dressing removed "     Patient ID: Evangelina Shine is a 58 y o  male  Patient is status post L LE with 9 micro phlebs  by Dr Jerry Carpenter on 12/14/18  Patient denies fever and chills  He denies pain from surgery  Patient does have pain from arthritis from meds being held prior to surgery  He has some swelling in his L leg  Fredo Bourgeois is a 57yo male with PMH of RA, GERD, varicose veins who is now s/p Left stab phlebectomies by Dr Leah Frank on 12/14/18 for his symptomatic varicose veins  Patient denies any pain, bleeding, swelling to the leg  His dressings were removed without any issues  He denies fever, chills, chest pain or shortness of breath            The following portions of the patient's history were reviewed and updated as appropriate: allergies, current medications, past family history, past medical history, past social history, past surgical history and problem list     Review of Systems   Constitutional: Negative  HENT: Negative  Eyes: Negative  Respiratory: Negative  Cardiovascular: Positive for leg swelling  Gastrointestinal: Negative  Endocrine: Negative  Genitourinary: Negative  Musculoskeletal: Negative  Skin: Negative  Allergic/Immunologic: Negative  Neurological: Negative  Hematological: Negative  Psychiatric/Behavioral: Negative  Objective:      Temp 97 8 °F (36 6 °C)   Resp 18   Ht 5' 11" (1 803 m)   Wt 75 8 kg (167 lb)   BMI 23 29 kg/m²          Physical Exam   Constitutional: He is oriented to person, place, and time  He appears well-developed and well-nourished  No distress  HENT:   Head: Normocephalic and atraumatic  Eyes: Pupils are equal, round, and reactive to light  EOM are normal  No scleral icterus  Neck: Normal range of motion  Cardiovascular: Normal rate, regular rhythm, normal heart sounds and intact distal pulses  Pulmonary/Chest: Effort normal and breath sounds normal    Abdominal: Soft  Bowel sounds are normal    Musculoskeletal: Normal range of motion  Neurological: He is alert and oriented to person, place, and time  Skin: Skin is warm and dry  Psychiatric: He has a normal mood and affect   His behavior is normal  Judgment and thought content normal        Vitals:    12/17/18 1458   Resp: 18   Temp: 97 8 °F (36 6 °C)   Weight: 75 8 kg (167 lb)   Height: 5' 11" (1 803 m)       Patient Active Problem List   Diagnosis    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       Past Surgical History:   Procedure Laterality Date    ELBOW SURGERY Right     tendon repair    FOOT SURGERY Bilateral     INGUINAL HERNIA REPAIR      KNEE SURGERY      NERVE BLOCK Right     peripheral nerve block wrist radial    PALATOPHARYNGOPLASTY      [place] PHLEB VEINS - EXTREM - TO 20 Left 12/14/2018    Procedure: LOWER EXTREMITY MICRO PHLEBECTOMIES;  Surgeon: Marija Kilgore DO;  Location: [facility];  Service: Vascular    SINUS SURGERY      TONSILLECTOMY AND ADENOIDECTOMY      UMBILICAL HERNIA REPAIR         Family History   Problem Relation Age of Onset    Heart disease Mother     Heart failure Mother     Transient ischemic attack Father     Gout Brother     Stroke Family         CVA       Social History     Social History    Marital status: /Civil Union     Spouse name: N/A    Number of children: N/A    Years of education: N/A     Occupational History    Not on file  Social History Main Topics    Smoking status: Never Smoker    Smokeless tobacco: Never Used    Alcohol use Yes      Comment: socially; 1-2 drinks every other week    Drug use: No    Sexual activity: No     Other Topics Concern    Not on file     Social History Narrative    Drinks coffee       Allergies   Allergen Reactions    Penicillins Anaphylaxis     Category:  Allergy;     Morphine Other (See Comments)     nausea    Sulfa Antibiotics Other (See Comments)     Unknown reaction         Current Outpatient Prescriptions:     B-COMPLEX-C PO, Take by mouth, Disp: , Rfl:     celecoxib (CeleBREX) 200 mg capsule, Take 200 mg by mouth 2 (two) times a day, Disp: , Rfl:     cyanocobalamin (VITAMIN B-12) 100 mcg tablet, Take by mouth daily, Disp: , Rfl:     DULoxetine (CYMBALTA) 30 mg delayed release capsule, Take 30 mg by mouth daily  , Disp: , Rfl:     esomeprazole (NexIUM) 40 MG capsule, TAKE 1 CAPSULE DAILY AS NEEDED FOR GASTROESOPHAGEAL REFLUX DISEASE, Disp: 90 capsule, Rfl: 0    Fexofenadine HCl (ALLERGY 24-HR PO), Take 1 capsule by mouth daily  , Disp: , Rfl:     gabapentin (NEURONTIN) 600 MG tablet, Take 1,800 mg by mouth daily  , Disp: , Rfl:     glucosamine-chondroitin 500-400 MG tablet, Take 1 tablet by mouth 3 (three) times a day, Disp: , Rfl:     Magnesium 250 MG TABS, Take 1,100 mg by mouth  , Disp: , Rfl:     milk thistle 175 MG tablet, Take 175 mg by mouth daily, Disp: , Rfl:     OIL OF OREGANO PO, Take by mouth, Disp: , Rfl:     orphenadrine (NORFLEX) 100 mg tablet, Take 1 tablet (100 mg total) by mouth 3 (three) times a day for 14 days, Disp: 42 tablet, Rfl: 0    oxyCODONE (ROXICODONE) 10 MG TABS, Take 10 mg by mouth every 6 (six) hours as needed, Disp: , Rfl: 0    oxyCODONE (ROXICODONE) 5 mg immediate release tablet, Take 1 tablet (5 mg total) by mouth every 4 (four) hours as needed for moderate pain for up to 10 days Max Daily Amount: 30 mg, Disp: 20 tablet, Rfl: 0    Potassium Citrate GRAN, by Does not apply route, Disp: , Rfl:     predniSONE 5 mg tablet, Take 10 mg by mouth daily  , Disp: , Rfl:     Tocilizumab (ACTEMRA IV), Infuse into a venous catheter every 30 (thirty) days, Disp: , Rfl:     TURMERIC CURCUMIN PO, Take by mouth, Disp: , Rfl:     Zinc Sulfate (ZINC 15 PO), Take by mouth, Disp: , Rfl:     zolpidem (AMBIEN) 10 mg tablet, Take 1 tablet (10 mg total) by mouth daily at bedtime as needed for sleep, Disp: 30 tablet, Rfl: 0    Calcium 250 MG CAPS, Take 500 mg by mouth, Disp: , Rfl:

## 2019-01-21 ENCOUNTER — LAB REQUISITION (OUTPATIENT)
Dept: LAB | Facility: HOSPITAL | Age: 64
End: 2019-01-21
Payer: COMMERCIAL

## 2019-01-21 DIAGNOSIS — L60.8 OTHER NAIL DISORDERS: ICD-10-CM

## 2019-01-21 PROCEDURE — 87106 FUNGI IDENTIFICATION YEAST: CPT | Performed by: ORTHOPAEDIC SURGERY

## 2019-01-21 PROCEDURE — 88313 SPECIAL STAINS GROUP 2: CPT | Performed by: PATHOLOGY

## 2019-01-21 PROCEDURE — 87075 CULTR BACTERIA EXCEPT BLOOD: CPT | Performed by: ORTHOPAEDIC SURGERY

## 2019-01-21 PROCEDURE — 87102 FUNGUS ISOLATION CULTURE: CPT | Performed by: ORTHOPAEDIC SURGERY

## 2019-01-21 PROCEDURE — 87186 SC STD MICRODIL/AGAR DIL: CPT | Performed by: ORTHOPAEDIC SURGERY

## 2019-01-21 PROCEDURE — 87206 SMEAR FLUORESCENT/ACID STAI: CPT | Performed by: ORTHOPAEDIC SURGERY

## 2019-01-21 PROCEDURE — 88305 TISSUE EXAM BY PATHOLOGIST: CPT | Performed by: PATHOLOGY

## 2019-01-21 PROCEDURE — 87205 SMEAR GRAM STAIN: CPT | Performed by: ORTHOPAEDIC SURGERY

## 2019-01-21 PROCEDURE — 87077 CULTURE AEROBIC IDENTIFY: CPT | Performed by: ORTHOPAEDIC SURGERY

## 2019-01-21 PROCEDURE — 87070 CULTURE OTHR SPECIMN AEROBIC: CPT | Performed by: ORTHOPAEDIC SURGERY

## 2019-01-21 PROCEDURE — 87147 CULTURE TYPE IMMUNOLOGIC: CPT | Performed by: ORTHOPAEDIC SURGERY

## 2019-01-21 PROCEDURE — 87116 MYCOBACTERIA CULTURE: CPT | Performed by: ORTHOPAEDIC SURGERY

## 2019-01-23 LAB
BACTERIA SPEC ANAEROBE CULT: NORMAL
BACTERIA WND AEROBE CULT: ABNORMAL
GRAM STN SPEC: ABNORMAL
GRAM STN SPEC: ABNORMAL

## 2019-01-24 LAB
BACTERIA WND AEROBE CULT: ABNORMAL
GRAM STN SPEC: ABNORMAL
GRAM STN SPEC: ABNORMAL

## 2019-02-06 LAB — FUNGUS SPEC CULT: ABNORMAL

## 2019-02-07 DIAGNOSIS — G47.00 INSOMNIA, UNSPECIFIED TYPE: ICD-10-CM

## 2019-02-07 RX ORDER — ZOLPIDEM TARTRATE 10 MG/1
10 TABLET ORAL
Qty: 30 TABLET | Refills: 0 | Status: SHIPPED | OUTPATIENT
Start: 2019-02-07 | End: 2019-04-10 | Stop reason: SDUPTHER

## 2019-02-11 LAB — FUNGUS SPEC CULT: NORMAL

## 2019-02-13 DIAGNOSIS — K21.9 GASTROESOPHAGEAL REFLUX DISEASE WITHOUT ESOPHAGITIS: ICD-10-CM

## 2019-02-13 RX ORDER — ESOMEPRAZOLE MAGNESIUM 40 MG/1
CAPSULE, DELAYED RELEASE ORAL
Qty: 90 CAPSULE | Refills: 0 | Status: SHIPPED | OUTPATIENT
Start: 2019-02-13 | End: 2019-05-14 | Stop reason: SDUPTHER

## 2019-02-25 DIAGNOSIS — M62.838 MUSCLE SPASM: ICD-10-CM

## 2019-02-25 RX ORDER — ORPHENADRINE CITRATE 100 MG/1
100 TABLET, EXTENDED RELEASE ORAL 3 TIMES DAILY
Qty: 270 TABLET | Refills: 0 | Status: SHIPPED | OUTPATIENT
Start: 2019-02-25 | End: 2019-06-07 | Stop reason: SDUPTHER

## 2019-03-12 LAB
MYCOBACTERIUM SPEC CULT: NORMAL
MYCOBACTERIUM SPEC CULT: NORMAL
RHODAMINE-AURAMINE STN SPEC: NORMAL
RHODAMINE-AURAMINE STN SPEC: NORMAL

## 2019-04-01 ENCOUNTER — TRANSITIONAL CARE MANAGEMENT (OUTPATIENT)
Dept: FAMILY MEDICINE CLINIC | Facility: CLINIC | Age: 64
End: 2019-04-01

## 2019-04-01 ENCOUNTER — TELEPHONE (OUTPATIENT)
Dept: FAMILY MEDICINE CLINIC | Facility: CLINIC | Age: 64
End: 2019-04-01

## 2019-04-10 ENCOUNTER — TELEPHONE (OUTPATIENT)
Dept: OBGYN CLINIC | Facility: HOSPITAL | Age: 64
End: 2019-04-10

## 2019-04-10 DIAGNOSIS — G47.00 INSOMNIA, UNSPECIFIED TYPE: ICD-10-CM

## 2019-04-10 RX ORDER — ZOLPIDEM TARTRATE 10 MG/1
10 TABLET ORAL
Qty: 30 TABLET | Refills: 0 | Status: SHIPPED | OUTPATIENT
Start: 2019-04-10 | End: 2019-04-10 | Stop reason: SDUPTHER

## 2019-04-10 RX ORDER — ZOLPIDEM TARTRATE 10 MG/1
10 TABLET ORAL
Qty: 30 TABLET | Refills: 0 | Status: SHIPPED | OUTPATIENT
Start: 2019-04-10 | End: 2019-06-03 | Stop reason: SDUPTHER

## 2019-04-17 ENCOUNTER — OFFICE VISIT (OUTPATIENT)
Dept: FAMILY MEDICINE CLINIC | Facility: CLINIC | Age: 64
End: 2019-04-17
Payer: COMMERCIAL

## 2019-04-17 VITALS
WEIGHT: 175.2 LBS | BODY MASS INDEX: 25.95 KG/M2 | DIASTOLIC BLOOD PRESSURE: 82 MMHG | HEIGHT: 69 IN | SYSTOLIC BLOOD PRESSURE: 132 MMHG

## 2019-04-17 DIAGNOSIS — M25.551 RIGHT HIP PAIN: ICD-10-CM

## 2019-04-17 DIAGNOSIS — Z01.818 PREOP EXAMINATION: ICD-10-CM

## 2019-04-17 DIAGNOSIS — K21.9 GASTROESOPHAGEAL REFLUX DISEASE, ESOPHAGITIS PRESENCE NOT SPECIFIED: ICD-10-CM

## 2019-04-17 DIAGNOSIS — Z11.59 NEED FOR HEPATITIS C SCREENING TEST: Primary | ICD-10-CM

## 2019-04-17 DIAGNOSIS — M06.9 RHEUMATOID ARTHRITIS, INVOLVING UNSPECIFIED SITE, UNSPECIFIED RHEUMATOID FACTOR PRESENCE: ICD-10-CM

## 2019-04-17 DIAGNOSIS — M19.90 OSTEOARTHRITIS, UNSPECIFIED OSTEOARTHRITIS TYPE, UNSPECIFIED SITE: ICD-10-CM

## 2019-04-17 DIAGNOSIS — G47.00 INSOMNIA, UNSPECIFIED TYPE: ICD-10-CM

## 2019-04-17 PROCEDURE — 99242 OFF/OP CONSLTJ NEW/EST SF 20: CPT | Performed by: FAMILY MEDICINE

## 2019-04-17 RX ORDER — ITRACONAZOLE 100 MG/1
CAPSULE ORAL
Refills: 5 | COMMUNITY
Start: 2019-03-21 | End: 2021-01-19

## 2019-04-19 LAB — HCV AB SER-ACNC: NEGATIVE

## 2019-04-22 ENCOUNTER — OFFICE VISIT (OUTPATIENT)
Dept: OBGYN CLINIC | Facility: HOSPITAL | Age: 64
End: 2019-04-22
Payer: COMMERCIAL

## 2019-04-22 ENCOUNTER — HOSPITAL ENCOUNTER (OUTPATIENT)
Dept: RADIOLOGY | Facility: HOSPITAL | Age: 64
Discharge: HOME | End: 2019-04-22
Attending: ORTHOPAEDIC SURGERY
Payer: COMMERCIAL

## 2019-04-22 VITALS
WEIGHT: 175 LBS | HEART RATE: 83 BPM | SYSTOLIC BLOOD PRESSURE: 176 MMHG | BODY MASS INDEX: 26.52 KG/M2 | HEIGHT: 68 IN | DIASTOLIC BLOOD PRESSURE: 93 MMHG

## 2019-04-22 DIAGNOSIS — M54.16 LUMBAR RADICULOPATHY: ICD-10-CM

## 2019-04-22 DIAGNOSIS — M51.36 DDD (DEGENERATIVE DISC DISEASE), LUMBAR: Primary | ICD-10-CM

## 2019-04-22 PROBLEM — M51.369 DDD (DEGENERATIVE DISC DISEASE), LUMBAR: Status: ACTIVE | Noted: 2019-04-22

## 2019-04-22 PROCEDURE — 72110 X-RAY EXAM L-2 SPINE 4/>VWS: CPT

## 2019-04-22 PROCEDURE — 99214 OFFICE O/P EST MOD 30 MIN: CPT | Performed by: ORTHOPAEDIC SURGERY

## 2019-05-01 ENCOUNTER — HOSPITAL ENCOUNTER (OUTPATIENT)
Dept: MRI IMAGING | Facility: HOSPITAL | Age: 64
Discharge: HOME | End: 2019-05-01
Attending: ORTHOPAEDIC SURGERY
Payer: COMMERCIAL

## 2019-05-01 DIAGNOSIS — M54.16 LUMBAR RADICULOPATHY: ICD-10-CM

## 2019-05-01 DIAGNOSIS — M51.36 DDD (DEGENERATIVE DISC DISEASE), LUMBAR: ICD-10-CM

## 2019-05-01 PROCEDURE — 72158 MRI LUMBAR SPINE W/O & W/DYE: CPT

## 2019-05-01 PROCEDURE — A9585 GADOBUTROL INJECTION: HCPCS | Performed by: ORTHOPAEDIC SURGERY

## 2019-05-01 RX ADMIN — GADOBUTROL 8 ML: 604.72 INJECTION INTRAVENOUS at 22:12

## 2019-05-02 ENCOUNTER — TELEPHONE (OUTPATIENT)
Dept: FAMILY MEDICINE CLINIC | Facility: CLINIC | Age: 64
End: 2019-05-02

## 2019-05-14 DIAGNOSIS — K21.9 GASTROESOPHAGEAL REFLUX DISEASE WITHOUT ESOPHAGITIS: ICD-10-CM

## 2019-05-14 RX ORDER — ESOMEPRAZOLE MAGNESIUM 40 MG/1
CAPSULE, DELAYED RELEASE ORAL
Qty: 90 CAPSULE | Refills: 0 | Status: SHIPPED | OUTPATIENT
Start: 2019-05-14 | End: 2019-08-12 | Stop reason: SDUPTHER

## 2019-05-20 ENCOUNTER — EVALUATION (OUTPATIENT)
Dept: PHYSICAL THERAPY | Facility: REHABILITATION | Age: 64
End: 2019-05-20
Payer: COMMERCIAL

## 2019-05-20 DIAGNOSIS — Z47.89 ORTHOPEDIC AFTERCARE: Primary | ICD-10-CM

## 2019-05-20 DIAGNOSIS — Z96.649 S/P REVISION OF TOTAL HIP: ICD-10-CM

## 2019-05-20 PROCEDURE — 97162 PT EVAL MOD COMPLEX 30 MIN: CPT | Performed by: PHYSICAL THERAPIST

## 2019-05-23 ENCOUNTER — OFFICE VISIT (OUTPATIENT)
Dept: PHYSICAL THERAPY | Facility: REHABILITATION | Age: 64
End: 2019-05-23
Payer: COMMERCIAL

## 2019-05-23 DIAGNOSIS — Z47.89 ORTHOPEDIC AFTERCARE: ICD-10-CM

## 2019-05-23 DIAGNOSIS — Z96.649 S/P REVISION OF TOTAL HIP: Primary | ICD-10-CM

## 2019-05-23 PROCEDURE — 97110 THERAPEUTIC EXERCISES: CPT | Performed by: PHYSICAL MEDICINE & REHABILITATION

## 2019-05-23 PROCEDURE — 97140 MANUAL THERAPY 1/> REGIONS: CPT | Performed by: PHYSICAL MEDICINE & REHABILITATION

## 2019-05-28 ENCOUNTER — APPOINTMENT (OUTPATIENT)
Dept: PHYSICAL THERAPY | Facility: REHABILITATION | Age: 64
End: 2019-05-28
Payer: COMMERCIAL

## 2019-05-30 ENCOUNTER — OFFICE VISIT (OUTPATIENT)
Dept: PHYSICAL THERAPY | Facility: REHABILITATION | Age: 64
End: 2019-05-30
Payer: COMMERCIAL

## 2019-05-30 ENCOUNTER — TRANSCRIBE ORDERS (OUTPATIENT)
Dept: PHYSICAL THERAPY | Facility: REHABILITATION | Age: 64
End: 2019-05-30

## 2019-05-30 DIAGNOSIS — Z96.649 S/P REVISION OF TOTAL HIP: Primary | ICD-10-CM

## 2019-05-30 DIAGNOSIS — Z47.89 ORTHOPEDIC AFTERCARE: ICD-10-CM

## 2019-05-30 PROCEDURE — 97112 NEUROMUSCULAR REEDUCATION: CPT

## 2019-05-30 PROCEDURE — 97140 MANUAL THERAPY 1/> REGIONS: CPT

## 2019-06-03 DIAGNOSIS — G47.00 INSOMNIA, UNSPECIFIED TYPE: ICD-10-CM

## 2019-06-03 RX ORDER — ZOLPIDEM TARTRATE 10 MG/1
10 TABLET ORAL
Qty: 30 TABLET | Refills: 0 | Status: SHIPPED | OUTPATIENT
Start: 2019-06-03 | End: 2019-07-08 | Stop reason: SDUPTHER

## 2019-06-04 ENCOUNTER — OFFICE VISIT (OUTPATIENT)
Dept: PHYSICAL THERAPY | Facility: REHABILITATION | Age: 64
End: 2019-06-04
Payer: COMMERCIAL

## 2019-06-04 DIAGNOSIS — Z96.649 S/P REVISION OF TOTAL HIP: Primary | ICD-10-CM

## 2019-06-04 DIAGNOSIS — Z47.89 ORTHOPEDIC AFTERCARE: ICD-10-CM

## 2019-06-04 PROCEDURE — 97112 NEUROMUSCULAR REEDUCATION: CPT | Performed by: PHYSICAL THERAPIST

## 2019-06-06 ENCOUNTER — OFFICE VISIT (OUTPATIENT)
Dept: PHYSICAL THERAPY | Facility: REHABILITATION | Age: 64
End: 2019-06-06
Payer: COMMERCIAL

## 2019-06-06 DIAGNOSIS — Z47.89 ORTHOPEDIC AFTERCARE: ICD-10-CM

## 2019-06-06 DIAGNOSIS — Z96.649 S/P REVISION OF TOTAL HIP: Primary | ICD-10-CM

## 2019-06-06 PROCEDURE — 97112 NEUROMUSCULAR REEDUCATION: CPT | Performed by: PHYSICAL MEDICINE & REHABILITATION

## 2019-06-07 DIAGNOSIS — M62.838 MUSCLE SPASM: ICD-10-CM

## 2019-06-07 RX ORDER — ORPHENADRINE CITRATE 100 MG/1
TABLET, EXTENDED RELEASE ORAL
Qty: 270 TABLET | Refills: 0 | Status: SHIPPED | OUTPATIENT
Start: 2019-06-07 | End: 2019-09-06 | Stop reason: SDUPTHER

## 2019-06-11 ENCOUNTER — OFFICE VISIT (OUTPATIENT)
Dept: PHYSICAL THERAPY | Facility: REHABILITATION | Age: 64
End: 2019-06-11
Payer: COMMERCIAL

## 2019-06-11 DIAGNOSIS — Z96.649 S/P REVISION OF TOTAL HIP: Primary | ICD-10-CM

## 2019-06-11 DIAGNOSIS — Z47.89 ORTHOPEDIC AFTERCARE: ICD-10-CM

## 2019-06-11 PROCEDURE — 97112 NEUROMUSCULAR REEDUCATION: CPT

## 2019-06-11 PROCEDURE — 97140 MANUAL THERAPY 1/> REGIONS: CPT

## 2019-06-13 ENCOUNTER — OFFICE VISIT (OUTPATIENT)
Dept: PHYSICAL THERAPY | Facility: REHABILITATION | Age: 64
End: 2019-06-13
Payer: COMMERCIAL

## 2019-06-13 DIAGNOSIS — Z47.89 ORTHOPEDIC AFTERCARE: ICD-10-CM

## 2019-06-13 DIAGNOSIS — Z96.649 S/P REVISION OF TOTAL HIP: Primary | ICD-10-CM

## 2019-06-13 PROCEDURE — 97112 NEUROMUSCULAR REEDUCATION: CPT | Performed by: PHYSICAL THERAPIST

## 2019-06-18 ENCOUNTER — APPOINTMENT (OUTPATIENT)
Dept: PHYSICAL THERAPY | Facility: REHABILITATION | Age: 64
End: 2019-06-18
Payer: COMMERCIAL

## 2019-06-20 ENCOUNTER — OFFICE VISIT (OUTPATIENT)
Dept: PHYSICAL THERAPY | Facility: REHABILITATION | Age: 64
End: 2019-06-20
Payer: COMMERCIAL

## 2019-06-20 DIAGNOSIS — Z96.649 S/P REVISION OF TOTAL HIP: Primary | ICD-10-CM

## 2019-06-20 DIAGNOSIS — Z47.89 ORTHOPEDIC AFTERCARE: ICD-10-CM

## 2019-06-20 PROCEDURE — 97140 MANUAL THERAPY 1/> REGIONS: CPT | Performed by: PHYSICAL THERAPIST

## 2019-06-20 PROCEDURE — 97112 NEUROMUSCULAR REEDUCATION: CPT | Performed by: PHYSICAL THERAPIST

## 2019-06-25 ENCOUNTER — OFFICE VISIT (OUTPATIENT)
Dept: PHYSICAL THERAPY | Facility: REHABILITATION | Age: 64
End: 2019-06-25
Payer: COMMERCIAL

## 2019-06-25 DIAGNOSIS — Z96.649 S/P REVISION OF TOTAL HIP: Primary | ICD-10-CM

## 2019-06-25 DIAGNOSIS — Z47.89 ORTHOPEDIC AFTERCARE: ICD-10-CM

## 2019-06-25 PROCEDURE — 97112 NEUROMUSCULAR REEDUCATION: CPT

## 2019-06-25 PROCEDURE — 97140 MANUAL THERAPY 1/> REGIONS: CPT

## 2019-06-25 PROCEDURE — 97110 THERAPEUTIC EXERCISES: CPT

## 2019-06-27 ENCOUNTER — APPOINTMENT (OUTPATIENT)
Dept: PHYSICAL THERAPY | Facility: REHABILITATION | Age: 64
End: 2019-06-27
Payer: COMMERCIAL

## 2019-07-01 ENCOUNTER — OFFICE VISIT (OUTPATIENT)
Dept: PHYSICAL THERAPY | Facility: REHABILITATION | Age: 64
End: 2019-07-01
Payer: COMMERCIAL

## 2019-07-01 DIAGNOSIS — Z47.89 ORTHOPEDIC AFTERCARE: ICD-10-CM

## 2019-07-01 DIAGNOSIS — Z96.649 S/P REVISION OF TOTAL HIP: Primary | ICD-10-CM

## 2019-07-01 PROCEDURE — 97112 NEUROMUSCULAR REEDUCATION: CPT

## 2019-07-01 PROCEDURE — 97110 THERAPEUTIC EXERCISES: CPT

## 2019-07-01 NOTE — PROGRESS NOTES
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    2  Orthopedic aftercare Z47 89                     Subjective:  Pt reports he saw Chiropractor & feels he is walking better    Objective: See treatment diary below    Assessment:  R LE gets fatigued with standing ex    Plan: Progress as patient tolerance allows  Continue to monitor precaution maintenance throughout sessions      Precautions: 50% WB status, standard hip precautions (post  Approach)  RA, Hx L hip LESVIA, L foot deformity, low back pain     As of - MD order for FWB, lifting of precautions    Daily Treatment Diary     Manual  - 6- 7-1    Hip ER, flex, abd LH AP SPT AP SPT  AP ABD only CD AP  KB CD CD    Man amrit str & ham str  AP SPT AP SPT AP SPT CD np                                                   Exercise Diary  -- 6- 7-1    Supine heel slides 10x10" x x x x x 10x10" x x    Supine hip abd 10x10" x x x x x15    Stand 20x    LAQ 2x10 x 3# AW 2x10  3# 30x x 4# 30x 4# 30x  x 5# 20x    Seated heel toe raises np 2x10 20 ea x 30x x x30 Stand TR 20x x    Seated hamstring curl  np 2x10  30x 3# 30x 4# 30x  4# 30x  x Stand 20x    QS  10' x 10 x          hookly supine clam  10"x10 x Red TB 10"x10 x Gr  TB 10x10" Gr  TB 10x10" x Blue x    SLR    2x10 10x 1x10, 2x5 1x10, 2x5 2x10 x    DLS march to      1D52 2x15  2x15 D/C     Long sit amrit str     30"x3 x 30"x3 Stand foot up 30"x3 x    Standing hip ext        2x10 20x R   10x L 20x    bike        10' x    minisquat        10x X 20x    ricky ADD         4# ball 10'x10                                                                                      Modalities              ice  10' 10'  10' 10'                                     Patient treated by Emory Ordoñez, SPT under the direct supervision of Damian Mesa PT

## 2019-07-03 ENCOUNTER — OFFICE VISIT (OUTPATIENT)
Dept: PHYSICAL THERAPY | Facility: REHABILITATION | Age: 64
End: 2019-07-03
Payer: COMMERCIAL

## 2019-07-03 DIAGNOSIS — Z47.89 ORTHOPEDIC AFTERCARE: ICD-10-CM

## 2019-07-03 DIAGNOSIS — Z96.649 S/P REVISION OF TOTAL HIP: Primary | ICD-10-CM

## 2019-07-03 PROCEDURE — 97112 NEUROMUSCULAR REEDUCATION: CPT | Performed by: PHYSICAL THERAPIST

## 2019-07-03 PROCEDURE — 97110 THERAPEUTIC EXERCISES: CPT | Performed by: PHYSICAL THERAPIST

## 2019-07-03 NOTE — PROGRESS NOTES
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    2  Orthopedic aftercare Z47 89                     Subjective:  Pt  Is still very achy  Objective: See treatment diary below    Assessment:  Able to perform exercises but challenged by same  Kept exercises the same for today  Plan: Progress as patient tolerance allows  Continue to monitor precaution maintenance throughout sessions      Precautions: 50% WB status, standard hip precautions (post  Approach)  RA, Hx L hip LESVIA, L foot deformity, low back pain     As of  MD order for FWB, lifting of precautions    Daily Treatment Diary     Manual  - 7- 7/3   Hip ER, flex, abd LH AP SPT AP SPT  AP ABD only CD AP  KB CD CD    Man amrit str & ham str  AP SPT AP SPT AP SPT CD np                                                   Exercise Diary  -- 7-1 7/3   Supine heel slides 10x10" x x x x x 10x10" x x x   Supine hip abd 10x10" x x x x x15    Stand 20x x   LAQ 2x10 x 3# AW 2x10  3# 30x x 4# 30x 4# 30x  x 5# 20x x   Seated heel toe raises np 2x10 20 ea x 30x x x30 Stand TR 20x x x   Seated hamstring curl  np 2x10  30x 3# 30x 4# 30x  4# 30x  x Stand 20x x   QS  10' x 10 x          hookly supine clam  10"x10 x Red TB 10"x10 x Gr  TB 10x10" Gr  TB 10x10" x Blue x x   SLR    2x10 10x 1x10, 2x5 1x10, 2x5 2x10 x x   DLS      3D17 2x15  2x15 D/C     Long sit amrit str     30"x3 x 30"x3 Stand foot up 30"x3 x x   Standing hip ext        2x10 20x R   10x L 20x x   bike        10' x x   minisquat        10x X 20x x   ricky ADD         4# ball 10'x10 x                                                                                     Modalities              ice  10' 10'  10' 10'                                     Patient treated by MISTY Grijalva under the direct supervision of Reese Lazaro PT

## 2019-07-08 DIAGNOSIS — G47.00 INSOMNIA, UNSPECIFIED TYPE: ICD-10-CM

## 2019-07-08 RX ORDER — ZOLPIDEM TARTRATE 10 MG/1
10 TABLET ORAL
Qty: 30 TABLET | Refills: 0 | Status: SHIPPED | OUTPATIENT
Start: 2019-07-08 | End: 2019-08-07 | Stop reason: SDUPTHER

## 2019-07-09 ENCOUNTER — APPOINTMENT (OUTPATIENT)
Dept: PHYSICAL THERAPY | Facility: REHABILITATION | Age: 64
End: 2019-07-09
Payer: COMMERCIAL

## 2019-07-11 ENCOUNTER — EVALUATION (OUTPATIENT)
Dept: PHYSICAL THERAPY | Facility: REHABILITATION | Age: 64
End: 2019-07-11
Payer: COMMERCIAL

## 2019-07-11 DIAGNOSIS — Z96.649 S/P REVISION OF TOTAL HIP: Primary | ICD-10-CM

## 2019-07-11 DIAGNOSIS — Z47.89 ORTHOPEDIC AFTERCARE: ICD-10-CM

## 2019-07-11 PROCEDURE — 97164 PT RE-EVAL EST PLAN CARE: CPT | Performed by: PHYSICAL THERAPIST

## 2019-07-11 PROCEDURE — 97112 NEUROMUSCULAR REEDUCATION: CPT | Performed by: PHYSICAL THERAPIST

## 2019-07-11 PROCEDURE — 97110 THERAPEUTIC EXERCISES: CPT | Performed by: PHYSICAL THERAPIST

## 2019-07-11 NOTE — PROGRESS NOTES
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    2  Orthopedic aftercare Z47 89                     Subjective: Is 75% improved since IE  Still limited as he has to use WC for work activities and walking on uneven ground  Feels very tight in quads and hip abductors  Also notes it is hard for him to control his R hip if he bends his knee while on his stomach  Reports improved stair negotiation since IE  Objective: See treatment diary below  Strength:  R Hip:  Flexion: 3+/5  Abduction: 3+/5  Extension: 3/5    R Knee:  5/5 flexion and extension     ROM:  R hip flex approx 100 degrees  Goals  STG's (4 weeks )   Pt will be independent with comprehensive HEP - in progress    Pt will demonstrate no less than 4/5 strength in gross RLE - In progress  Pt will report at least 2 point reduction in pain at worst on NPRS - in progress; has had pain in "all of his joints" as he has not had a Humira injection recently  LTG's ( to be achieved by d/c)   Pt will be able to self manage sx's independently - in progress   Pt will be able to ambulate community distances w/o AD - in progress; pt continues to use WC at work, but is not currently using AD for ambulation at work or in his home  Pt will be able to perform work related duties w/o use of power scooter- in progress; pt continuing to use WC for work duties       Assessment:  Re-evaluation today revealed improved strength in the R hip compared to IE, as well as improved R hip flexion ROM  Additionally, patient is making progress towards his functional goals as he is not ambulating with an AD  However, he does continue to need formal PT services for greater improvements in hip strength so he can return to recreational and work activities without limitation    Patient continues to have pain in the R hip, but attributes this to lack of recent Humira injection  Added chair quadriceps and hip flexor stretch today to address reports of tightness  Progressed ex today with pt reporting increased muscular fatigue  Plan: Progress as patient tolerance allows  Precautions: 50% WB status, standard hip precautions (post  Approach)  RA, Hx L hip LESVIA, L foot deformity, low back pain     As of 6-21 MD order for FWB, lifting of precautions    Daily Treatment Diary     Manual  7/11 5-30 6/4 6/6 6-11 6/13  6/20 6-25 7-1 7/3   Hip ER, flex, abd  AP SPT AP SPT  AP ABD only CD AP  KB CD CD    Man amrit str & ham str  AP SPT AP SPT AP SPT CD np                                                   Exercise Diary  7/11 5-30 6/4 6/6 6-11 6-13 6/20 6-25 7-1 7/3   Supine heel slides 10x10" x x x x x 10x10" x x x   Supine hip abd 10x10" x x x x x15    Stand 20x x   LAQ 20x 5# x 3# AW 2x10  3# 30x x 4# 30x 4# 30x  x 5# 20x x   Stand TR/stand ABD x20 2# 2x10 20 ea x 30x x x30 Stand TR 20x x x   Seated hamstring curl  Standing 20x 2# 2x10  30x 3# 30x 4# 30x  4# 30x  x Stand 20x x   QS  10' x 10 x          hookly supine clam Blue 10" 10x 10"x10 x Red TB 10"x10 x Gr  TB 10x10" Gr  TB 10x10" x Blue x x   SLR 2x10 2 5#    2x10 10x 1x10, 2x5 1x10, 2x5 2x10 x x   DLS march to 90     2I83 2x15  2x15 D/C     Long sit amrti str x    30"x3 x 30"x3 Stand foot up 30"x3 x x   Standing hip ext  x20 2#      2x10 20x R   10x L 20x x   bike 10'       10' x x   minisquat 30x        10x X 20x x   ricky ADD NV        4# ball 10'x10 x   Bridges 20x                                                                                  Modalities              ice  10' 10'  10' 10'                                     Patient treated by Cristina Devi SPT under the direct supervision of Pat Miller, PT

## 2019-07-16 ENCOUNTER — OFFICE VISIT (OUTPATIENT)
Dept: PHYSICAL THERAPY | Facility: REHABILITATION | Age: 64
End: 2019-07-16
Payer: COMMERCIAL

## 2019-07-16 DIAGNOSIS — Z96.649 S/P REVISION OF TOTAL HIP: Primary | ICD-10-CM

## 2019-07-16 DIAGNOSIS — Z47.89 ORTHOPEDIC AFTERCARE: ICD-10-CM

## 2019-07-16 PROCEDURE — 97110 THERAPEUTIC EXERCISES: CPT

## 2019-07-16 PROCEDURE — 97112 NEUROMUSCULAR REEDUCATION: CPT

## 2019-07-16 NOTE — PROGRESS NOTES
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    2  Orthopedic aftercare Z47 89                   Subjective:   Arthritis pain      Objective: See treatment diary below  ASSESSMENT:  Doing well with ex, good motivation    Precautions:   RA, Hx L hip LESVIA, L foot deformity, low back pain  As of  MD order for FWB, lifting of precautions      Plan: Continue per plan of care        Manual                                                                                  Exercise Diary             Supine heel slides 10x10" x           Supine hip abd 10x10"            LAQ 20x 5# x           Stand TR/stand ABD x20 2# x           Seated hamstring curl  Standing 20x 2# x           QS             hookly supine clam Blue 10" 10x x           SLR 2x10 2 5#  x           DLS march to               amrit str x 1/2 foam 30"x3           Standing hip ext  x20 2# x           bike 10' x           minisquat 30x  x           ricky ADD NV 4# ball 5" 20x           Bridges 20x  x           Self stretches quad, hip flx, & abd  x                                                                   Modalities              ice  10' 10'  10' 10'                    X=same as last visit

## 2019-07-18 ENCOUNTER — APPOINTMENT (OUTPATIENT)
Dept: PHYSICAL THERAPY | Facility: REHABILITATION | Age: 64
End: 2019-07-18
Payer: COMMERCIAL

## 2019-07-23 ENCOUNTER — APPOINTMENT (OUTPATIENT)
Dept: PHYSICAL THERAPY | Facility: REHABILITATION | Age: 64
End: 2019-07-23
Payer: COMMERCIAL

## 2019-07-25 ENCOUNTER — OFFICE VISIT (OUTPATIENT)
Dept: PHYSICAL THERAPY | Facility: REHABILITATION | Age: 64
End: 2019-07-25
Payer: COMMERCIAL

## 2019-07-25 DIAGNOSIS — Z96.649 S/P REVISION OF TOTAL HIP: Primary | ICD-10-CM

## 2019-07-25 DIAGNOSIS — Z47.89 ORTHOPEDIC AFTERCARE: ICD-10-CM

## 2019-07-25 PROCEDURE — 97112 NEUROMUSCULAR REEDUCATION: CPT

## 2019-07-25 PROCEDURE — 97110 THERAPEUTIC EXERCISES: CPT

## 2019-07-25 NOTE — PROGRESS NOTES
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    2  Orthopedic aftercare Z47 89                   Subjective: Pt arrives with c/o's of R hip pain 8/10 after having stepped off front door step having forgotten that he removed ramp on his porch  States he landed on is L leg but still felt pain in his R hip  Objective: See treatment diary below      Assessment: Tolerated treatment well  Patient would benefit from continued PT For improved strength, flexibility and overall function  Pt agreeable to PT despite high pain levels at arrival  [facility] appropriately with TE program and noted a decrease in R hip pain following treatment  Pt has a good understanding of TE program and is self-directed  Plan: Continue per plan of care        Manual  -                                                                               Exercise Diary            Supine heel slides 10x10" x x          Supine hip abd 10x10"  x          LAQ 20x 5# x x          Stand TR/stand ABD x20 2# x x          Seated hamstring curl  Standing 20x 2# x x          QS             hookly supine clam Blue 10" 10x x x          SLR 2x10 2 5#  x x          DLS march to               amrit str x 1/2 foam 30"x3 x          Standing hip ext  x20 2# x x          bike 10' x x          minisquat 30x  x x          ricky ADD NV 4# ball 5" 20x x          Bridges 20x  x x          Self stretches quad, hip flx, & abd  x x                                                                  Modalities              ice  10'                        X=same as last visit

## 2019-08-01 ENCOUNTER — OFFICE VISIT (OUTPATIENT)
Dept: PHYSICAL THERAPY | Facility: REHABILITATION | Age: 64
End: 2019-08-01
Payer: COMMERCIAL

## 2019-08-01 DIAGNOSIS — Z47.89 ORTHOPEDIC AFTERCARE: ICD-10-CM

## 2019-08-01 DIAGNOSIS — Z96.649 S/P REVISION OF TOTAL HIP: Primary | ICD-10-CM

## 2019-08-01 PROCEDURE — 97110 THERAPEUTIC EXERCISES: CPT

## 2019-08-01 PROCEDURE — 97112 NEUROMUSCULAR REEDUCATION: CPT

## 2019-08-01 NOTE — PROGRESS NOTES
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    2  Orthopedic aftercare Z47 89                   Subjective:  Reports he is feeling much better today  He feels strength & endurance is improving      Objective: See treatment diary below      Assessment:    No c/o with increases  Did not wish to do hip ext due to LB pain  Plan: Continue per plan of care        Manual   8-1                                                                              Exercise Diary   8-1         Supine heel slides 10x10" x x          Supine hip abd 10x10"  x          LAQ 20x 5# x x 5# 20x         Stand TR/stand ABD x20 2# x x 2 5# 20x         Seated hamstring curl  Standing 20x 2# x x 2 5#  20x         QS             hookly supine clam Blue 10" 10x x x x         SLR 2x10 2 5#  x x x         DLS march to               amrit str x 1/2 foam 30"x3 x x         Standing hip ext  x20 2# x x np         bike 10' x x x         minisquat 30x  x x          ricky ADD NV 4# ball 5" 20x x x         Bridges 20x  x x x         Self stretches quad, hip flx, & abd  x x x                                                                 Modalities              ice  10'                        X=same as last visit

## 2019-08-07 DIAGNOSIS — G47.00 INSOMNIA, UNSPECIFIED TYPE: ICD-10-CM

## 2019-08-07 RX ORDER — ZOLPIDEM TARTRATE 10 MG/1
10 TABLET ORAL
Qty: 30 TABLET | Refills: 0 | Status: SHIPPED | OUTPATIENT
Start: 2019-08-07 | End: 2019-08-30 | Stop reason: SDUPTHER

## 2019-08-08 ENCOUNTER — OFFICE VISIT (OUTPATIENT)
Dept: PHYSICAL THERAPY | Facility: REHABILITATION | Age: 64
End: 2019-08-08
Payer: COMMERCIAL

## 2019-08-08 DIAGNOSIS — Z96.649 S/P REVISION OF TOTAL HIP: Primary | ICD-10-CM

## 2019-08-08 DIAGNOSIS — Z47.89 ORTHOPEDIC AFTERCARE: ICD-10-CM

## 2019-08-08 PROCEDURE — 97112 NEUROMUSCULAR REEDUCATION: CPT | Performed by: PHYSICAL THERAPIST

## 2019-08-08 PROCEDURE — 97110 THERAPEUTIC EXERCISES: CPT | Performed by: PHYSICAL THERAPIST

## 2019-08-08 NOTE — PROGRESS NOTES
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    2  Orthopedic aftercare Z47 89                   Subjective: Patient states pain with performance of functional activity of deep squatting  Objective: See treatment diary below      Assessment: Patient performed increased weight with exercises as listed without c/o pain  Plan: Continue per plan of care        Manual   8-                                                                             Exercise Diary   8-        Supine heel slides 10x10" x x          Supine hip abd 10x10"  x          LAQ 20x 5# x x 5# 20x 5# 20x        Stand TR/stand ABD x20 2# x x 2 5# 20x 5#  20x        Seated hamstring curl  Standing 20x 2# x x 2 5#  20x 5#  20x        QS             hookly supine clam Blue 10" 10x x x x x        SLR 2x10 2 5#  x x x 2 5# 2x10        DLS march to               amrit str x 1/ foam 30"x3 x x x        Standing hip ext  x20 2# x x np np        bike 10' x x x 10 min        minisquat 30x  x x          ricky ADD NV 4# ball 5" 20x x x x        Bridges 20x  x x x x        Self stretches quad, hip flx, & abd  x x x x                                                                Modalities              ice  10'                        X=same as last visit

## 2019-08-12 DIAGNOSIS — K21.9 GASTROESOPHAGEAL REFLUX DISEASE WITHOUT ESOPHAGITIS: ICD-10-CM

## 2019-08-12 RX ORDER — ESOMEPRAZOLE MAGNESIUM 40 MG/1
CAPSULE, DELAYED RELEASE ORAL
Qty: 90 CAPSULE | Refills: 0 | Status: SHIPPED | OUTPATIENT
Start: 2019-08-12 | End: 2019-11-10 | Stop reason: SDUPTHER

## 2019-08-30 ENCOUNTER — OFFICE VISIT (OUTPATIENT)
Dept: FAMILY MEDICINE CLINIC | Facility: CLINIC | Age: 64
End: 2019-08-30
Payer: COMMERCIAL

## 2019-08-30 VITALS
DIASTOLIC BLOOD PRESSURE: 80 MMHG | HEIGHT: 68 IN | BODY MASS INDEX: 26.22 KG/M2 | OXYGEN SATURATION: 98 % | HEART RATE: 88 BPM | WEIGHT: 173 LBS | SYSTOLIC BLOOD PRESSURE: 128 MMHG | TEMPERATURE: 98.5 F

## 2019-08-30 DIAGNOSIS — Z12.11 COLON CANCER SCREENING: ICD-10-CM

## 2019-08-30 DIAGNOSIS — G47.00 INSOMNIA, UNSPECIFIED TYPE: ICD-10-CM

## 2019-08-30 DIAGNOSIS — M06.9 RHEUMATOID ARTHRITIS, INVOLVING UNSPECIFIED SITE, UNSPECIFIED RHEUMATOID FACTOR PRESENCE: ICD-10-CM

## 2019-08-30 DIAGNOSIS — K21.9 GASTROESOPHAGEAL REFLUX DISEASE, ESOPHAGITIS PRESENCE NOT SPECIFIED: ICD-10-CM

## 2019-08-30 DIAGNOSIS — E78.5 HYPERLIPIDEMIA, UNSPECIFIED HYPERLIPIDEMIA TYPE: ICD-10-CM

## 2019-08-30 DIAGNOSIS — Z12.5 SCREENING FOR PROSTATE CANCER: ICD-10-CM

## 2019-08-30 DIAGNOSIS — Z00.00 HEALTH CARE MAINTENANCE: Primary | ICD-10-CM

## 2019-08-30 PROCEDURE — 99397 PER PM REEVAL EST PAT 65+ YR: CPT | Performed by: FAMILY MEDICINE

## 2019-08-30 RX ORDER — GLUCOSAMINE HCL 500 MG
2 TABLET ORAL DAILY
COMMUNITY

## 2019-08-30 RX ORDER — ZOLPIDEM TARTRATE 10 MG/1
10 TABLET ORAL
Qty: 30 TABLET | Refills: 0 | Status: SHIPPED | OUTPATIENT
Start: 2019-08-30 | End: 2019-10-22 | Stop reason: SDUPTHER

## 2019-08-30 NOTE — PROGRESS NOTES
Assessment/Plan:  Chief Complaint   Patient presents with    Physical Exam     Here for yearly physical exam      Patient Instructions   Here for general PE and also reviewed labs and is stable  Hx of RA and sees Rheumatology as directed and also hx of mild elevated LDL and will need to check cmp and lipids and also monitor blood sugar  Check PSA for prostate screening  Colon screening recommended as pt  States his last colon screening 2008  Hx of gerd  No problem-specific Assessment & Plan notes found for this encounter  Diagnoses and all orders for this visit:    Health care maintenance    Insomnia, unspecified type    Rheumatoid arthritis, involving unspecified site, unspecified rheumatoid factor presence (HCC)    Gastroesophageal reflux disease, esophagitis presence not specified    Screening for prostate cancer  -     PSA, total and free; Future    Hyperlipidemia, unspecified hyperlipidemia type  -     Lipid Panel with Direct LDL reflex; Future  -     Comprehensive metabolic panel; Future    Other orders  -     Cholecalciferol (VITAMIN D3) 3000 units TABS; Take 2 tablets by mouth daily  -     Boswellia Sally (BOSWELLIA PO); Take by mouth 400 mg 3 x's a day  -     Coenzyme Q10 (CO Q 10) 100 MG CAPS; Take 2 capsules by mouth daily          Subjective:      Patient ID: Franc Werner is a 71 y o  male  Physical Exam (Here for yearly physical exam ) No cp or sob, or ha  Has a hx of RA and sees her every 3 months  The following portions of the patient's history were reviewed and updated as appropriate: allergies, current medications, past family history, past medical history, past social history, past surgical history and problem list     Review of Systems   Constitutional: Negative  HENT: Negative  Eyes: Negative  Respiratory: Negative  Cardiovascular: Negative  Gastrointestinal: Negative  Endocrine: Negative  Genitourinary: Negative  Musculoskeletal: Negative  Skin: Negative  Allergic/Immunologic: Negative  Neurological: Negative  Hematological: Negative  Psychiatric/Behavioral: Negative  Objective:      /80   Pulse 88   Temp 98 5 °F (36 9 °C) (Temporal)   Ht 5' 8" (1 727 m)   Wt 78 5 kg (173 lb)   SpO2 98%   BMI 26 30 kg/m²          Physical Exam   Constitutional: He is oriented to person, place, and time  He appears well-developed and well-nourished  HENT:   Head: Normocephalic and atraumatic  Right Ear: External ear normal    Left Ear: External ear normal    Nose: Nose normal    Mouth/Throat: Oropharynx is clear and moist    Eyes: Pupils are equal, round, and reactive to light  Conjunctivae and EOM are normal    Neck: Normal range of motion  Neck supple  Cardiovascular: Normal rate, regular rhythm, normal heart sounds and intact distal pulses  Pulmonary/Chest: Effort normal and breath sounds normal    Abdominal: Soft  Bowel sounds are normal    Genitourinary: Rectum normal, prostate normal and penis normal  Rectal exam shows guaiac negative stool  Musculoskeletal: Normal range of motion  Neurological: He is alert and oriented to person, place, and time  He has normal reflexes  Skin: Skin is warm and dry  Psychiatric: He has a normal mood and affect   His behavior is normal

## 2019-08-30 NOTE — PATIENT INSTRUCTIONS
Here for general PE and also reviewed labs and is stable  Hx of RA and sees Rheumatology as directed and also hx of mild elevated LDL and will need to check cmp and lipids and also monitor blood sugar  Check PSA for prostate screening  Colon screening recommended as pt  States his last colon screening 2008  Hx of gerd

## 2019-09-06 DIAGNOSIS — M62.838 MUSCLE SPASM: ICD-10-CM

## 2019-09-06 RX ORDER — ORPHENADRINE CITRATE 100 MG/1
TABLET, EXTENDED RELEASE ORAL
Qty: 270 TABLET | Refills: 0 | Status: SHIPPED | OUTPATIENT
Start: 2019-09-06 | End: 2019-12-04 | Stop reason: SDUPTHER

## 2019-10-22 DIAGNOSIS — G47.00 INSOMNIA, UNSPECIFIED TYPE: ICD-10-CM

## 2019-10-22 RX ORDER — ZOLPIDEM TARTRATE 10 MG/1
10 TABLET ORAL
Qty: 30 TABLET | Refills: 3 | Status: SHIPPED | OUTPATIENT
Start: 2019-10-22 | End: 2020-04-16

## 2019-11-10 DIAGNOSIS — K21.9 GASTROESOPHAGEAL REFLUX DISEASE WITHOUT ESOPHAGITIS: ICD-10-CM

## 2019-11-11 RX ORDER — ESOMEPRAZOLE MAGNESIUM 40 MG/1
CAPSULE, DELAYED RELEASE ORAL
Qty: 90 CAPSULE | Refills: 4 | Status: SHIPPED | OUTPATIENT
Start: 2019-11-11 | End: 2021-02-02

## 2019-12-04 DIAGNOSIS — M62.838 MUSCLE SPASM: ICD-10-CM

## 2019-12-04 RX ORDER — ORPHENADRINE CITRATE 100 MG/1
TABLET, EXTENDED RELEASE ORAL
Qty: 270 TABLET | Refills: 4 | Status: SHIPPED | OUTPATIENT
Start: 2019-12-04 | End: 2020-03-10 | Stop reason: SDUPTHER

## 2020-03-10 DIAGNOSIS — M62.838 MUSCLE SPASM: ICD-10-CM

## 2020-03-10 RX ORDER — ORPHENADRINE CITRATE 100 MG/1
TABLET, EXTENDED RELEASE ORAL
Qty: 270 TABLET | Refills: 1 | Status: SHIPPED | OUTPATIENT
Start: 2020-03-10 | End: 2020-12-02 | Stop reason: SDUPTHER

## 2020-03-10 RX ORDER — ORPHENADRINE CITRATE 100 MG/1
TABLET, EXTENDED RELEASE ORAL
Qty: 42 TABLET | Refills: 0 | Status: SHIPPED | OUTPATIENT
Start: 2020-03-10 | End: 2020-03-10 | Stop reason: SDUPTHER

## 2020-04-02 ENCOUNTER — TELEMEDICINE (OUTPATIENT)
Dept: FAMILY MEDICINE CLINIC | Facility: CLINIC | Age: 65
End: 2020-04-02
Payer: COMMERCIAL

## 2020-04-02 DIAGNOSIS — J30.2 SEASONAL ALLERGIES: ICD-10-CM

## 2020-04-02 DIAGNOSIS — M06.9 RHEUMATOID ARTHRITIS, INVOLVING UNSPECIFIED SITE, UNSPECIFIED RHEUMATOID FACTOR PRESENCE: ICD-10-CM

## 2020-04-02 DIAGNOSIS — J32.9 SINUSITIS, UNSPECIFIED CHRONICITY, UNSPECIFIED LOCATION: ICD-10-CM

## 2020-04-02 DIAGNOSIS — R05.9 COUGH: Primary | ICD-10-CM

## 2020-04-02 PROCEDURE — 99214 OFFICE O/P EST MOD 30 MIN: CPT | Performed by: FAMILY MEDICINE

## 2020-04-02 RX ORDER — PREDNISONE 10 MG/1
TABLET ORAL
Qty: 21 TABLET | Refills: 0 | Status: ON HOLD | OUTPATIENT
Start: 2020-04-02 | End: 2021-01-25

## 2020-04-02 RX ORDER — AZITHROMYCIN 250 MG/1
TABLET, FILM COATED ORAL
Qty: 6 TABLET | Refills: 0 | Status: SHIPPED | OUTPATIENT
Start: 2020-04-02 | End: 2020-04-16

## 2020-04-16 DIAGNOSIS — G47.00 INSOMNIA, UNSPECIFIED TYPE: ICD-10-CM

## 2020-04-16 DIAGNOSIS — J32.9 SINUSITIS, UNSPECIFIED CHRONICITY, UNSPECIFIED LOCATION: ICD-10-CM

## 2020-04-16 DIAGNOSIS — R05.9 COUGH: ICD-10-CM

## 2020-04-16 RX ORDER — AZITHROMYCIN 250 MG/1
TABLET, FILM COATED ORAL
Qty: 6 TABLET | Refills: 0 | Status: SHIPPED | OUTPATIENT
Start: 2020-04-16 | End: 2020-04-21

## 2020-04-16 RX ORDER — ZOLPIDEM TARTRATE 10 MG/1
TABLET ORAL
Qty: 30 TABLET | Refills: 3 | Status: SHIPPED | OUTPATIENT
Start: 2020-04-16 | End: 2020-09-04 | Stop reason: SDUPTHER

## 2020-05-28 ENCOUNTER — TELEPHONE (OUTPATIENT)
Dept: FAMILY MEDICINE CLINIC | Facility: CLINIC | Age: 65
End: 2020-05-28

## 2020-05-28 DIAGNOSIS — Z20.822 EXPOSURE TO COVID-19 VIRUS: Primary | ICD-10-CM

## 2020-05-28 DIAGNOSIS — E29.1 HYPOGONADISM IN MALE: ICD-10-CM

## 2020-08-06 ENCOUNTER — TELEPHONE (OUTPATIENT)
Dept: FAMILY MEDICINE CLINIC | Facility: CLINIC | Age: 65
End: 2020-08-06

## 2020-08-06 DIAGNOSIS — R00.0 TACHYCARDIA: Primary | ICD-10-CM

## 2020-08-06 NOTE — TELEPHONE ENCOUNTER
Patient is currently getting an infusion treatment  The nurse took his heart rate and it was 137  He was advised to call his PCP      Please leave a message on patient's home number [phone]

## 2020-08-06 NOTE — TELEPHONE ENCOUNTER
I spoke to the pt and he states that it was yesterday that his HR was 137 and BP was 110/80  He then took his HR again at midnight and it was 116  While I was on the phone he took is BP R arm at 12:00pm and his HR was 69 and BP: 136/75  Pt states that he is not having any sx   Please advise, thanks

## 2020-08-06 NOTE — TELEPHONE ENCOUNTER
Ok monitor pulse and Bp and I rec office visit with cardiology for tachycardia  Call if elevated or ER if pulse rate or palpitations occur

## 2020-09-04 ENCOUNTER — OFFICE VISIT (OUTPATIENT)
Dept: FAMILY MEDICINE CLINIC | Facility: CLINIC | Age: 65
End: 2020-09-04
Payer: COMMERCIAL

## 2020-09-04 VITALS
HEART RATE: 72 BPM | BODY MASS INDEX: 25.85 KG/M2 | DIASTOLIC BLOOD PRESSURE: 80 MMHG | OXYGEN SATURATION: 98 % | WEIGHT: 170.6 LBS | HEIGHT: 68 IN | SYSTOLIC BLOOD PRESSURE: 124 MMHG | TEMPERATURE: 97.1 F

## 2020-09-04 DIAGNOSIS — Z00.00 HEALTH CARE MAINTENANCE: Primary | ICD-10-CM

## 2020-09-04 DIAGNOSIS — M51.36 DDD (DEGENERATIVE DISC DISEASE), LUMBAR: ICD-10-CM

## 2020-09-04 DIAGNOSIS — G47.00 INSOMNIA, UNSPECIFIED TYPE: ICD-10-CM

## 2020-09-04 DIAGNOSIS — Z12.5 SCREENING FOR PROSTATE CANCER: ICD-10-CM

## 2020-09-04 DIAGNOSIS — R00.0 HEART RATE FAST: ICD-10-CM

## 2020-09-04 DIAGNOSIS — R53.83 FATIGUE, UNSPECIFIED TYPE: ICD-10-CM

## 2020-09-04 DIAGNOSIS — Z12.11 SCREEN FOR COLON CANCER: Primary | ICD-10-CM

## 2020-09-04 DIAGNOSIS — Z13.220 SCREENING FOR HYPERLIPIDEMIA: ICD-10-CM

## 2020-09-04 DIAGNOSIS — M06.9 RHEUMATOID ARTHRITIS, INVOLVING UNSPECIFIED SITE, UNSPECIFIED RHEUMATOID FACTOR PRESENCE: ICD-10-CM

## 2020-09-04 PROCEDURE — 1160F RVW MEDS BY RX/DR IN RCRD: CPT | Performed by: FAMILY MEDICINE

## 2020-09-04 PROCEDURE — 1036F TOBACCO NON-USER: CPT | Performed by: FAMILY MEDICINE

## 2020-09-04 PROCEDURE — 99397 PER PM REEVAL EST PAT 65+ YR: CPT | Performed by: FAMILY MEDICINE

## 2020-09-04 PROCEDURE — 3725F SCREEN DEPRESSION PERFORMED: CPT | Performed by: FAMILY MEDICINE

## 2020-09-04 RX ORDER — TERBINAFINE HYDROCHLORIDE 250 MG/1
250 TABLET ORAL DAILY
COMMUNITY
Start: 2020-08-20 | End: 2020-11-18

## 2020-09-04 RX ORDER — ZOLPIDEM TARTRATE 10 MG/1
10 TABLET ORAL
Qty: 30 TABLET | Refills: 3 | Status: SHIPPED | OUTPATIENT
Start: 2020-09-04 | End: 2021-01-29

## 2020-09-04 NOTE — PATIENT INSTRUCTIONS
Here for general PE and has hx of RA and DDD and also insomnia and will be seeing Cardiology today for moments of fast heart rate  Rec checking testosterone for hx of fatigue and poor muscle growth  Rec eating healthy and staying active and rec f-up in 1 year for PE  Get labs as directed after 12/9/20 for yearly labs  Colon screening due

## 2020-09-04 NOTE — PROGRESS NOTES
Assessment/Plan:  Chief Complaint   Patient presents with    Physical Exam     Patient Instructions   Here for general PE and has hx of RA and DDD and also insomnia and will be seeing Cardiology today for moments of fast heart rate  Rec checking testosterone for hx of fatigue and poor muscle growth  Rec eating healthy and staying active and rec f-up in 1 year for PE  Get labs as directed after 12/9/20 for yearly labs  Colon screening due  No problem-specific Assessment & Plan notes found for this encounter  Diagnoses and all orders for this visit:    Health care maintenance  -     Comprehensive metabolic panel; Future  -     CBC and differential; Future  -     Testosterone, free, total; Future  -     Lipid Panel with Direct LDL reflex; Future  -     PSA, total and free; Future    Rheumatoid arthritis, involving unspecified site, unspecified rheumatoid factor presence ([facility] [address] )  -     Comprehensive metabolic panel; Future  -     CBC and differential; Future  -     Testosterone, free, total; Future  -     Lipid Panel with Direct LDL reflex; Future  -     PSA, total and free; Future    Insomnia, unspecified type  -     zolpidem (AMBIEN) 10 mg tablet; Take 1 tablet (10 mg total) by mouth daily at bedtime as needed for sleep  -     Comprehensive metabolic panel; Future  -     CBC and differential; Future  -     Testosterone, free, total; Future  -     Lipid Panel with Direct LDL reflex; Future  -     PSA, total and free; Future    DDD (degenerative disc disease), lumbar    Heart rate fast    Screening for hyperlipidemia  -     Comprehensive metabolic panel; Future  -     Lipid Panel with Direct LDL reflex; Future    Fatigue, unspecified type  -     Testosterone, free, total; Future    Other orders  -     terbinafine (LamISIL) 250 mg tablet; Take 250 mg by mouth daily          Subjective:      Patient ID: Pedrito Boyer is a 76 y o  male  Here for general PE and is 76years old   No known exposure to COVID 19  Has had fast heart rate at times and sees Cardiologist today  Patient on Actemra 8 mg/kg (600) mg SQ every 4-5 weeks  Neeeds refill on zolpidem  Patient is on terbinafine for onychomycosis by  infectious disease specialist        The following portions of the patient's history were reviewed and updated as appropriate: allergies, current medications, past family history, past medical history, past social history, past surgical history and problem list     Review of Systems   Constitutional: Positive for fatigue  HENT: Negative  Eyes: Negative  Respiratory: Negative  Cardiovascular:        Fast heart rate at times   Gastrointestinal: Negative  Endocrine: Negative  Genitourinary: Negative  Musculoskeletal: Positive for arthralgias (RA)  Poor muscle growth   Skin:        Toenail fungus    Allergic/Immunologic: Negative  Neurological: Negative  Hematological: Negative  Psychiatric/Behavioral:        Hx of insomnia         Objective:      /80   Pulse 72   Temp (!) 97 1 °F (36 2 °C) (Temporal)   Ht 5' 7 5" (1 715 m)   Wt 77 4 kg (170 lb 9 6 oz)   SpO2 98%   BMI 26 33 kg/m²          Physical Exam  Constitutional:       Appearance: He is well-developed  HENT:      Head: Normocephalic and atraumatic  Right Ear: External ear normal       Left Ear: External ear normal       Nose: Nose normal    Eyes:      Conjunctiva/sclera: Conjunctivae normal       Pupils: Pupils are equal, round, and reactive to light  Neck:      Musculoskeletal: Normal range of motion and neck supple  Cardiovascular:      Rate and Rhythm: Normal rate and regular rhythm  Heart sounds: Normal heart sounds  Pulmonary:      Effort: Pulmonary effort is normal       Breath sounds: Normal breath sounds  Abdominal:      General: Abdomen is flat  Bowel sounds are normal       Palpations: Abdomen is soft     Genitourinary:     Penis: Normal        Prostate: Normal       Rectum: Guaiac result negative  Musculoskeletal: Normal range of motion  Skin:     General: Skin is warm and dry  Neurological:      Mental Status: He is alert and oriented to person, place, and time  Deep Tendon Reflexes: Reflexes are normal and symmetric     Psychiatric:         Behavior: Behavior normal       Comments: Hx of insomnia

## 2020-12-02 DIAGNOSIS — M62.838 MUSCLE SPASM: Primary | ICD-10-CM

## 2020-12-02 DIAGNOSIS — M62.838 MUSCLE SPASM: ICD-10-CM

## 2020-12-02 RX ORDER — ORPHENADRINE CITRATE 100 MG/1
100 TABLET, EXTENDED RELEASE ORAL 3 TIMES DAILY PRN
Qty: 21 TABLET | Refills: 0 | Status: SHIPPED | OUTPATIENT
Start: 2020-12-02 | End: 2021-01-15 | Stop reason: SDUPTHER

## 2020-12-02 RX ORDER — ORPHENADRINE CITRATE 100 MG/1
TABLET, EXTENDED RELEASE ORAL
Qty: 270 TABLET | Refills: 1 | Status: ON HOLD | OUTPATIENT
Start: 2020-12-02 | End: 2021-01-25

## 2020-12-09 ENCOUNTER — VBI (OUTPATIENT)
Dept: ADMINISTRATIVE | Facility: OTHER | Age: 65
End: 2020-12-09

## 2021-01-15 ENCOUNTER — OFFICE VISIT (OUTPATIENT)
Dept: FAMILY MEDICINE CLINIC | Facility: CLINIC | Age: 66
End: 2021-01-15
Payer: COMMERCIAL

## 2021-01-15 VITALS
TEMPERATURE: 97.6 F | WEIGHT: 174.2 LBS | SYSTOLIC BLOOD PRESSURE: 128 MMHG | HEART RATE: 56 BPM | RESPIRATION RATE: 17 BRPM | OXYGEN SATURATION: 97 % | DIASTOLIC BLOOD PRESSURE: 66 MMHG | BODY MASS INDEX: 26.4 KG/M2 | HEIGHT: 68 IN

## 2021-01-15 DIAGNOSIS — I83.892 VARICOSE VEINS OF LEFT LOWER EXTREMITY WITH COMPLICATIONS: ICD-10-CM

## 2021-01-15 DIAGNOSIS — M62.838 MUSCLE SPASM: ICD-10-CM

## 2021-01-15 DIAGNOSIS — M20.41 HAMMERTOE OF RIGHT FOOT: ICD-10-CM

## 2021-01-15 DIAGNOSIS — M51.36 DDD (DEGENERATIVE DISC DISEASE), LUMBAR: ICD-10-CM

## 2021-01-15 DIAGNOSIS — Z01.818 PREOP EXAMINATION: Primary | ICD-10-CM

## 2021-01-15 DIAGNOSIS — G47.00 INSOMNIA, UNSPECIFIED TYPE: ICD-10-CM

## 2021-01-15 DIAGNOSIS — M06.9 RHEUMATOID ARTHRITIS, INVOLVING UNSPECIFIED SITE, UNSPECIFIED WHETHER RHEUMATOID FACTOR PRESENT (HCC): ICD-10-CM

## 2021-01-15 PROCEDURE — 99242 OFF/OP CONSLTJ NEW/EST SF 20: CPT | Performed by: FAMILY MEDICINE

## 2021-01-15 PROCEDURE — 3288F FALL RISK ASSESSMENT DOCD: CPT | Performed by: FAMILY MEDICINE

## 2021-01-15 PROCEDURE — 3008F BODY MASS INDEX DOCD: CPT | Performed by: FAMILY MEDICINE

## 2021-01-15 PROCEDURE — 1160F RVW MEDS BY RX/DR IN RCRD: CPT | Performed by: FAMILY MEDICINE

## 2021-01-15 PROCEDURE — 3725F SCREEN DEPRESSION PERFORMED: CPT | Performed by: FAMILY MEDICINE

## 2021-01-15 PROCEDURE — 1036F TOBACCO NON-USER: CPT | Performed by: FAMILY MEDICINE

## 2021-01-15 PROCEDURE — 1101F PT FALLS ASSESS-DOCD LE1/YR: CPT | Performed by: FAMILY MEDICINE

## 2021-01-15 RX ORDER — ORPHENADRINE CITRATE 100 MG/1
100 TABLET, EXTENDED RELEASE ORAL 3 TIMES DAILY PRN
Qty: 270 TABLET | Refills: 0 | Status: SHIPPED | OUTPATIENT
Start: 2021-01-15 | End: 2021-04-21

## 2021-01-15 RX ORDER — ATORVASTATIN CALCIUM 40 MG/1
TABLET, FILM COATED ORAL
COMMUNITY
Start: 2020-12-22

## 2021-01-15 NOTE — PROGRESS NOTES
Assessment/Plan:  Chief Complaint   Patient presents with    Pre-op Exam     Patient Instructions   Here for Preop for right great hammertoe surgery by Dr Aaron Castro 1/25/21 at [facility]  Patient to follow CDC guidelineas for prevention of covid 19 and CBC for preop done yesterday  Avoid fish oil for 1 week  CBC was wnl yesterday  Refilled Norflex brand only as generic did not work on last prescription order  Patient is medically cleared for surgery on 1/25/21 by Dr Aaron Castro for right hammertoe surgery right foot  No problem-specific Assessment & Plan notes found for this encounter  Diagnoses and all orders for this visit:    Preop examination    Rheumatoid arthritis, involving unspecified site, unspecified whether rheumatoid factor present (HCC)    Insomnia, unspecified type    DDD (degenerative disc disease), lumbar    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    Hammertoe of right foot    Muscle spasm  -     orphenadrine (NORFLEX) 100 mg tablet; Take 1 tablet (100 mg total) by mouth 3 (three) times a day as needed for muscle spasms (name brand only)    Other orders  -     atorvastatin (LIPITOR) 40 mg tablet; TAKE 1 TABLET BY MOUTH EVERY DAY AT NIGHT  -     metoprolol tartrate (LOPRESSOR) 25 mg tablet; Take 25 mg by mouth 2 (two) times a day          Subjective:      Patient ID: Ragena Cogan is a 76 y o  male  Here for preop and eval for surgery 1/25/21 at [facility] by Dr Aaron Castro for right foot hammertoe  Had labs done yesterday for PAT, a CBC  Non smoker, on no blood thinners  Takes fish oil and is stopping 1 week before surgery  The following portions of the patient's history were reviewed and updated as appropriate: allergies, current medications, past family history, past medical history, past social history, past surgical history and problem list     Review of Systems   Constitutional: Negative  HENT: Negative  Eyes: Negative  Respiratory: Negative      Cardiovascular: Negative  Gastrointestinal: Negative  Endocrine: Negative  Genitourinary: Negative  Musculoskeletal:        Right hammertoe great toe   Skin: Negative  Allergic/Immunologic: Negative  Neurological: Negative  Hematological: Negative  Psychiatric/Behavioral: Negative  Objective:      /66 (BP Location: Left arm, Patient Position: Sitting, Cuff Size: Adult)   Pulse 56   Temp 97 6 °F (36 4 °C) (Temporal)   Resp 17   Ht 5' 7 5" (1 715 m)   Wt 79 kg (174 lb 3 2 oz)   SpO2 97%   BMI 26 88 kg/m²          Physical Exam  Constitutional:       Appearance: He is well-developed  HENT:      Head: Normocephalic and atraumatic  Right Ear: External ear normal       Left Ear: External ear normal       Nose: Nose normal    Eyes:      Conjunctiva/sclera: Conjunctivae normal       Pupils: Pupils are equal, round, and reactive to light  Neck:      Musculoskeletal: Normal range of motion and neck supple  Cardiovascular:      Rate and Rhythm: Normal rate and regular rhythm  Heart sounds: Normal heart sounds  Pulmonary:      Effort: Pulmonary effort is normal       Breath sounds: Normal breath sounds  Abdominal:      General: Abdomen is flat  Bowel sounds are normal       Palpations: Abdomen is soft  Comments: Diastasis recti   Musculoskeletal: Normal range of motion  Comments: Right foot hammertoe great toe   Skin:     General: Skin is warm and dry  Neurological:      Mental Status: He is alert and oriented to person, place, and time  Deep Tendon Reflexes: Reflexes are normal and symmetric     Psychiatric:         Behavior: Behavior normal

## 2021-01-15 NOTE — PROGRESS NOTES
BMI Counseling: Body mass index is 26 88 kg/m²  The BMI is above normal  Nutrition recommendations include reducing portion sizes, decreasing overall calorie intake, 3-5 servings of fruits/vegetables daily, reducing fast food intake, consuming healthier snacks, decreasing soda and/or juice intake, moderation in carbohydrate intake and reducing intake of cholesterol  Exercise recommendations include exercising 3-5 times per week

## 2021-01-15 NOTE — PATIENT INSTRUCTIONS
Here for Preop for right great hammertoe surgery by Dr Cookie Rocha 1/25/21 at [facility]  Patient to follow CDC guidelineas for prevention of covid 19 and CBC for preop done yesterday  Avoid fish oil for 1 week  CBC was wnl yesterday  Refilled Norflex brand only as generic did not work on last prescription order  Patient is medically cleared for surgery on 1/25/21 by Dr Cookie Rocha for right hammertoe surgery right foot

## 2021-01-19 RX ORDER — DIPHENHYDRAMINE HCL 25 MG
25 TABLET ORAL
COMMUNITY

## 2021-01-19 RX ORDER — ASCORBIC ACID 500 MG
500 TABLET ORAL 4 TIMES DAILY
COMMUNITY

## 2021-01-19 RX ORDER — TERBINAFINE HYDROCHLORIDE 250 MG/1
250 TABLET ORAL DAILY
Status: ON HOLD | COMMUNITY
End: 2021-01-25

## 2021-01-19 RX ORDER — MULTIVITAMIN
1 TABLET ORAL DAILY
COMMUNITY

## 2021-01-19 NOTE — PRE-PROCEDURE INSTRUCTIONS
Pre-Surgery Instructions:   Medication Instructions    ascorbic acid (VITAMIN C) 500 mg tablet Instructed patient per Anesthesia Guidelines   atorvastatin (LIPITOR) 40 mg tablet Instructed patient per Anesthesia Guidelines   B-COMPLEX-C PO Instructed patient per Anesthesia Guidelines   Boswellia Sally (BOSWELLIA PO) Instructed patient per Anesthesia Guidelines   celecoxib (CeleBREX) 200 mg capsule Instructed patient per Anesthesia Guidelines   Cholecalciferol (VITAMIN D3) 3000 units TABS Instructed patient per Anesthesia Guidelines   Coenzyme Q10 (CO Q 10) 100 MG CAPS Instructed patient per Anesthesia Guidelines   cyanocobalamin (VITAMIN B-12) 100 mcg tablet Instructed patient per Anesthesia Guidelines   diphenhydrAMINE (BENADRYL) 25 mg tablet Instructed patient per Anesthesia Guidelines   esomeprazole (NexIUM) 40 MG capsule Instructed patient per Anesthesia Guidelines   gabapentin (NEURONTIN) 600 MG tablet Instructed patient per Anesthesia Guidelines   glucosamine-chondroitin 500-400 MG tablet Instructed patient per Anesthesia Guidelines   MAGNESIUM PO Instructed patient per Anesthesia Guidelines   metoprolol tartrate (LOPRESSOR) 25 mg tablet Instructed patient per Anesthesia Guidelines   milk thistle 175 MG tablet Instructed patient per Anesthesia Guidelines   Multiple Vitamin (multivitamin) tablet Instructed patient per Anesthesia Guidelines   OIL OF OREGANO PO Instructed patient per Anesthesia Guidelines   Omega-3 Fatty Acids (FISH OIL PO) Instructed patient per Anesthesia Guidelines   orphenadrine (NORFLEX) 100 mg tablet Instructed patient per Anesthesia Guidelines   oxyCODONE (ROXICODONE) 10 MG TABS Instructed patient per Anesthesia Guidelines   Potassium Citrate GRAN Instructed patient per Anesthesia Guidelines   predniSONE 5 mg tablet Instructed patient per Anesthesia Guidelines      terbinafine (LamISIL) 250 mg tablet Instructed patient per Anesthesia Guidelines   Tocilizumab (ACTEMRA IV) Instructed patient per Anesthesia Guidelines   TURMERIC CURCUMIN PO Instructed patient per Anesthesia Guidelines   Zinc Sulfate (ZINC 15 PO) Instructed patient per Anesthesia Guidelines   zolpidem (AMBIEN) 10 mg tablet Instructed patient per Anesthesia Guidelines  Instructed to take Prednisone, Metoprolol, Esomeprazole, and may take Oxycodone prn with sip of water the morning of surgery  No aspirin, NSAIDs, vitamins, or supplements from now until after surgery though patient reports must take the glucosamine with chondroitin

## 2021-01-22 ENCOUNTER — ANESTHESIA EVENT (OUTPATIENT)
Dept: PERIOP | Facility: HOSPITAL | Age: 66
End: 2021-01-22
Payer: COMMERCIAL

## 2021-01-25 ENCOUNTER — HOSPITAL ENCOUNTER (OUTPATIENT)
Facility: HOSPITAL | Age: 66
Setting detail: OUTPATIENT SURGERY
Discharge: HOME | End: 2021-01-25
Attending: PODIATRIST | Admitting: PODIATRIST
Payer: COMMERCIAL

## 2021-01-25 ENCOUNTER — APPOINTMENT (OUTPATIENT)
Dept: RADIOLOGY | Facility: HOSPITAL | Age: 66
End: 2021-01-25
Payer: COMMERCIAL

## 2021-01-25 ENCOUNTER — ANESTHESIA (OUTPATIENT)
Dept: PERIOP | Facility: HOSPITAL | Age: 66
End: 2021-01-25
Payer: COMMERCIAL

## 2021-01-25 VITALS
HEIGHT: 69 IN | RESPIRATION RATE: 20 BRPM | DIASTOLIC BLOOD PRESSURE: 66 MMHG | SYSTOLIC BLOOD PRESSURE: 124 MMHG | HEART RATE: 66 BPM | BODY MASS INDEX: 25.77 KG/M2 | TEMPERATURE: 98 F | OXYGEN SATURATION: 100 % | WEIGHT: 174 LBS

## 2021-01-25 VITALS — HEART RATE: 53 BPM

## 2021-01-25 PROCEDURE — 73630 X-RAY EXAM OF FOOT: CPT

## 2021-01-25 RX ORDER — EPHEDRINE SULFATE 50 MG/ML
INJECTION INTRAVENOUS AS NEEDED
Status: DISCONTINUED | OUTPATIENT
Start: 2021-01-25 | End: 2021-01-25

## 2021-01-25 RX ORDER — OXYCODONE HYDROCHLORIDE AND ACETAMINOPHEN 5; 325 MG/1; MG/1
1 TABLET ORAL EVERY 4 HOURS PRN
Status: DISCONTINUED | OUTPATIENT
Start: 2021-01-25 | End: 2021-01-25 | Stop reason: HOSPADM

## 2021-01-25 RX ORDER — FENTANYL CITRATE/PF 50 MCG/ML
25 SYRINGE (ML) INJECTION
Status: DISCONTINUED | OUTPATIENT
Start: 2021-01-25 | End: 2021-01-25 | Stop reason: HOSPADM

## 2021-01-25 RX ORDER — MIDAZOLAM HYDROCHLORIDE 2 MG/2ML
INJECTION, SOLUTION INTRAMUSCULAR; INTRAVENOUS AS NEEDED
Status: DISCONTINUED | OUTPATIENT
Start: 2021-01-25 | End: 2021-01-25

## 2021-01-25 RX ORDER — ONDANSETRON 2 MG/ML
INJECTION INTRAMUSCULAR; INTRAVENOUS AS NEEDED
Status: DISCONTINUED | OUTPATIENT
Start: 2021-01-25 | End: 2021-01-25

## 2021-01-25 RX ORDER — PROPOFOL 10 MG/ML
INJECTION, EMULSION INTRAVENOUS AS NEEDED
Status: DISCONTINUED | OUTPATIENT
Start: 2021-01-25 | End: 2021-01-25

## 2021-01-25 RX ORDER — CEFAZOLIN SODIUM 2 G/50ML
SOLUTION INTRAVENOUS AS NEEDED
Status: DISCONTINUED | OUTPATIENT
Start: 2021-01-25 | End: 2021-01-25

## 2021-01-25 RX ORDER — FENTANYL CITRATE 50 UG/ML
INJECTION, SOLUTION INTRAMUSCULAR; INTRAVENOUS AS NEEDED
Status: DISCONTINUED | OUTPATIENT
Start: 2021-01-25 | End: 2021-01-25

## 2021-01-25 RX ORDER — ONDANSETRON 2 MG/ML
4 INJECTION INTRAMUSCULAR; INTRAVENOUS ONCE AS NEEDED
Status: DISCONTINUED | OUTPATIENT
Start: 2021-01-25 | End: 2021-01-25 | Stop reason: HOSPADM

## 2021-01-25 RX ORDER — LIDOCAINE HYDROCHLORIDE 10 MG/ML
INJECTION, SOLUTION EPIDURAL; INFILTRATION; INTRACAUDAL; PERINEURAL AS NEEDED
Status: DISCONTINUED | OUTPATIENT
Start: 2021-01-25 | End: 2021-01-25

## 2021-01-25 RX ORDER — SODIUM CHLORIDE 9 MG/ML
125 INJECTION, SOLUTION INTRAVENOUS CONTINUOUS
Status: DISCONTINUED | OUTPATIENT
Start: 2021-01-25 | End: 2021-01-25 | Stop reason: HOSPADM

## 2021-01-25 RX ORDER — MAGNESIUM HYDROXIDE 1200 MG/15ML
LIQUID ORAL AS NEEDED
Status: DISCONTINUED | OUTPATIENT
Start: 2021-01-25 | End: 2021-01-25 | Stop reason: HOSPADM

## 2021-01-25 RX ADMIN — ONDANSETRON 4 MG: 2 INJECTION INTRAMUSCULAR; INTRAVENOUS at 15:57

## 2021-01-25 RX ADMIN — MIDAZOLAM 2 MG: 1 INJECTION INTRAMUSCULAR; INTRAVENOUS at 15:40

## 2021-01-25 RX ADMIN — PROPOFOL 100 MG: 10 INJECTION, EMULSION INTRAVENOUS at 15:46

## 2021-01-25 RX ADMIN — LIDOCAINE HYDROCHLORIDE 60 MG: 10 INJECTION, SOLUTION EPIDURAL; INFILTRATION; INTRACAUDAL; PERINEURAL at 15:43

## 2021-01-25 RX ADMIN — OXYCODONE HYDROCHLORIDE AND ACETAMINOPHEN 1 TABLET: 5; 325 TABLET ORAL at 17:51

## 2021-01-25 RX ADMIN — SODIUM CHLORIDE: 0.9 INJECTION, SOLUTION INTRAVENOUS at 16:19

## 2021-01-25 RX ADMIN — EPHEDRINE SULFATE 7.5 MG: 50 INJECTION, SOLUTION INTRAVENOUS at 16:00

## 2021-01-25 RX ADMIN — PHENYLEPHRINE HYDROCHLORIDE 100 MCG: 10 INJECTION INTRAVENOUS at 16:03

## 2021-01-25 RX ADMIN — EPHEDRINE SULFATE 7.5 MG: 50 INJECTION, SOLUTION INTRAVENOUS at 16:03

## 2021-01-25 RX ADMIN — PHENYLEPHRINE HYDROCHLORIDE 100 MCG: 10 INJECTION INTRAVENOUS at 16:23

## 2021-01-25 RX ADMIN — SODIUM CHLORIDE 125 ML/HR: 0.9 INJECTION, SOLUTION INTRAVENOUS at 13:26

## 2021-01-25 RX ADMIN — EPHEDRINE SULFATE 10 MG: 50 INJECTION, SOLUTION INTRAVENOUS at 16:37

## 2021-01-25 RX ADMIN — EPHEDRINE SULFATE 7.5 MG: 50 INJECTION, SOLUTION INTRAVENOUS at 15:54

## 2021-01-25 RX ADMIN — EPHEDRINE SULFATE 7.5 MG: 50 INJECTION, SOLUTION INTRAVENOUS at 16:10

## 2021-01-25 RX ADMIN — PHENYLEPHRINE HYDROCHLORIDE 100 MCG: 10 INJECTION INTRAVENOUS at 16:17

## 2021-01-25 RX ADMIN — CEFAZOLIN SODIUM 2000 MG: 2 SOLUTION INTRAVENOUS at 15:28

## 2021-01-25 RX ADMIN — PROPOFOL 200 MG: 10 INJECTION, EMULSION INTRAVENOUS at 15:43

## 2021-01-25 RX ADMIN — PHENYLEPHRINE HYDROCHLORIDE 100 MCG: 10 INJECTION INTRAVENOUS at 16:37

## 2021-01-25 RX ADMIN — FENTANYL CITRATE 100 MCG: 50 INJECTION, SOLUTION INTRAMUSCULAR; INTRAVENOUS at 15:46

## 2021-01-25 NOTE — ANESTHESIA PREPROCEDURE EVALUATION
Review of Systems/Medical History  Patient summary reviewed  Chart reviewed  No history of anesthetic complications     Cardiovascular  Negative cardio ROS    Pulmonary  Sleep apnea (s/p UPPP and mandibular advancement) ,        GI/Hepatic    GERD (active reflux) poorly controlled,        Negative  ROS        Endo/Other  Negative endo/other ROS      GYN       Hematology  Negative hematology ROS      Musculoskeletal  Rheumatoid arthritis (Chronic steroid use - prednisone 10 mg) ,   Comment: Right hip dislocations Arthritis     Neurology  Negative neurology ROS      Psychology   Anxiety, Depression ,   Chronic opioid dependence (40 mg oxycodone per day) Chronic pain,            Physical Exam    Airway  Comment: underbite  Mallampati score: I  TM Distance: >3 FB  Neck ROM: full     Dental   No notable dental hx     Cardiovascular  Comment: Negative ROS, Rhythm: regular, Rate: normal, Cardiovascular exam normal    Pulmonary  Pulmonary exam normal Breath sounds clear to auscultation,     Other Findings      Lab Results   Component Value Date    WBC 6 54 12/03/2018    HGB 15 6 12/03/2018     12/03/2018     Lab Results   Component Value Date     12/03/2018    K 4 7 12/03/2018    BUN 18 12/03/2018    CREATININE 0 75 12/03/2018    GLUCOSE 100 12/03/2018     Anesthesia Plan  ASA Score- 3     Anesthesia Type- general with ASA Monitors  Additional Monitors:   Airway Plan:     Comment: Hx hyoid op/tongue op (remote) for MOUNIKA  Plan Factors-    Chart reviewed  Patient summary reviewed  Patient is not a current smoker  Patient instructed to abstain from smoking on day of procedure  Patient did not smoke on day of surgery  Induction- intravenous  Postoperative Plan-     Informed Consent- Anesthetic plan and risks discussed with patient and spouse

## 2021-01-25 NOTE — DISCHARGE INSTRUCTIONS
Dr Dede Hammer Instructions    1  Take your prescribed medication as directed  2  Upon arrival at home, lie down and elevate your surgical foot on 2 pillows  3  Remain quiet, off your feet as much as possible, for the first 24-48 hours  This is when your feet first swell and may become painful  After 48 hours you may begin limited walking following these restrictions:   Weightbear as tolerated to surgical foot with surgical shoe   4  Drink large quantities of water  Consume no alcohol  Continue a well-balanced diet  5  Report any unusual discomfort or fever to this office  6  A limited amount of discomfort and swelling is to be expected  In some cases the skin may take on a bruised appearance  The surgical solution that was applied to your foot prior to the operation is dark in color and the operation site may appear to be oozing when it actually is not  7  A slight amount of blood is to be expected, and is no cause for alarm  Do not remove the dressings  If there is active bleeding and if the bleeding persists, add additional gauze to the bandage, apply direct pressure, elevate your feet and call this office  8  Do not get the dressings wet  As regular bathing may be inconvenient, sponge baths are recommended  9  When anesthesia wears off and if any discomfort should be present, apply an ice pack directly over the operated area for 15 minute intervals for several hours or until the pain leaves  (USE IN EXCESS OF 15 MINUTES COULD CAUSE FROSTBITE)  Do not use hot water bags or electric pads  A convenient icepack can be made by placing ice cubes in a plastic bag and covering this with a towel  10  If necessary, take a mild laxative before retiring  11  Wear your special open shoes anytime you put weight on your foot, even if it is just to walk to the bathroom and back  It will probably be 2 or 3 weeks before you will be permitted to try regular shoes    12  Having performed the operation, we are interested in a prompt recovery  Please cooperate by following the above instructions  13  Please call to confirm your post-op appointment or call with any other questions

## 2021-01-25 NOTE — OP NOTE
OPERATIVE REPORT - Podiatry  PATIENT NAME: Melinda Gomez    :  [birth year]  MRN: [id number]  Pt Location: [facility] OR ROOM 04    SURGERY DATE: 2021    Surgeon(s) and Role:     * Brynn Lombardi DPM - Primary     * Corbin Bucio DPM - Assisting    Pre-op Diagnosis:  Bunion of right foot [M21 611]  Other hammer toe(s) (acquired), right foot [M20 41]    Post-Op Diagnosis Codes:     * Bunion of right foot [M21 611]     * Other hammer toe(s) (acquired), right foot [M20 41]    Procedure(s) (LRB):  BUNIONECTOMY Barragan (Right)  2ND MPJ RELEASE (Right)    Specimen(s):  * No specimens in log *    Estimated Blood Loss:   20 mL    Drains:  * No LDAs found *    Anesthesia Type:   IV Sedation with Anesthesia with 10 ml of 1% Lidocaine and 0 5% Bupivacaine in a 1:1 mixture    Hemostasis:  Left pneumatic ankle tourniquet at 250 mmHg for 35 minutes    Materials:  * No implants in log *  2-0 Vicryl, 4-0 Vicryl, 4-0 nylon    Operative Findings:  Consistent with diagnosis  Hypertrophic scar soft tissue noted along with grayish discolored metatarsal head bone with 20% cartilage denuded the metatarsal head  Complications:   None    Procedure and Technique:     Under mild sedation, the patient was brought into the operating room and placed on the operating room table in the supine position  IV sedation was achieved by anesthesia team and a universal timeout was performed where all parties are in agreement of correct patient, correct procedure and correct site  A pneumatic tourniquet was then placed over the patient's right lower extremity with ample padding  A cloud block was performed consisting of 10 ml of 1% Lidocaine and 0 5% Bupivacaine in a 1:1 mixture  The foot was then prepped and draped in the usual aseptic manner  An esmarch bandage was used to exsangunate the foot and the pneumatic tourniquet was then inflated to 250mmHg      Attention was then directed to the dorsal aspect of the 1st metatarsal where approximately 5 cm linear incision was made medial to the extensor hallucis longus tendon  The incision was carried down to the subcutaneous tissue using sharp and blunt dissection  Care was taken to identify and retract all the neurovascular structures  Bleeders were cauterized as necessary  The incision was then carried down to the level of periosteum, the periosteum was reflected off of the bone medially and laterally  The 1st metatarsal bone appeared to be grayish in color, cartilage was inspected and about 20% was noted to be denuded  Using the same incision a lateral release was performed by removing adductor tendon attachment to the 1st metatarsal head  Attention was then directed to the medial aspect of the 1st metatarsal head where a prominent eminence was located  Utilizing a sagittal saw the prominent eminence was resected  The incision was then rinsed with copious amount of normal sterile saline  A medial capsulorrhaphy was performed using a 2-0 Vicryl suture  At this time and adequate correction was obtained  The periosteal and capsular structures were reapproximated utilizing a 2-0 Vicryl  Subcutaneous tissue was then reapproximated utilizing a 4-0 Vicryl  The skin edges were then reapproximated utilizing a 4-0 nylon in a horizontal mattress fashion  Attention was then directed to the 2nd metatarsophalangeal joint where a 2 cm linear incision was made using a 15  Blade  Incision was carried down to the level of subcutaneous tissue using sharp and blunt dissection  A capsulotomy was then performed by resecting the capsule medially and laterally  The incision was then rinsed with copious amount of normal sterile saline  Subcutaneous tissue was then reapproximated utilizing a 4-0 Vicryl  The skin edges were then reapproximated utilizing a 4-0 nylon in horizontal mattress fashion  The incision sites were then dressed with Xeroform, 4 x 4 gauze, Kerlix and a Ace wrap   The tourniquet was deflated at approximately 35 min and normal hyperemic response was noted to all digits  The patient tolerated the procedure and anesthesia well without immediate complications and transferred to PACU with vital signs stable  Dr Caitlyn Bearden was present during the entire procedure and participated in all seaman aspects  Feli Felix DPM  DATE: January 25, 2021  TIME: 4:44 PM      Portions of the record may have been created with voice recognition software  Occasional wrong word or "sound a like" substitutions may have occurred due to the inherent limitations of voice recognition software  Read the chart carefully and recognize, using context, where substitutions have occurred

## 2021-01-25 NOTE — INTERVAL H&P NOTE
H&P reviewed  After examining the patient I find no changes in the patients condition since the H&P had been written      Vitals:    01/25/21 1315   BP: 134/67   Pulse: (!) 54   Resp: 16   Temp: (!) 97 4 °F (36 3 °C)   SpO2: 100%

## 2021-01-25 NOTE — DISCHARGE SUMMARY
Discharge Summary Outpatient Procedure Podiatry -   Rosalina Veliz 76 y o  male MRN: [id number]  Unit/Bed#: [facility] Encounter: [id number]    Admission Date: 1/25/2021     Admitting Diagnosis: Bunion of right foot [M21 611]  Other hammer toe(s) (acquired), right foot [M20 41]    Discharge Diagnosis: same    Procedures Performed: BUNIONECTOMY Barragan: 45104 (CPT®)  2ND MPJ RELEASE: 76417 (CPT®)    Complications: none    Condition at Discharge: stable    Discharge instructions/Information to patient and family:   See after visit summary for information provided to patient and family  Provisions for Follow-Up Care/Important appointments:  See after visit summary for information related to follow-up care and any pertinent home health orders  Discharge Medications:  See after visit summary for reconciled discharge medications provided to patient and family

## 2021-01-25 NOTE — ANESTHESIA POSTPROCEDURE EVALUATION
Post-Op Assessment Note    CV Status:  Stable  Pain Score: 2    Pain management: adequate     Mental Status:  Alert and awake   Hydration Status:  Euvolemic   PONV Controlled:  Controlled   Airway Patency:  Patent      Post Op Vitals Reviewed: Yes      Staff: Anesthesiologist         No complications documented      /57 (01/25/21 1643)    Temp 98 1 °F (36 7 °C) (01/25/21 1643)    Pulse 60 (01/25/21 1643)   Resp 16 (01/25/21 1643)    SpO2 99 % (01/25/21 1643)

## 2021-01-29 DIAGNOSIS — G47.00 INSOMNIA, UNSPECIFIED TYPE: ICD-10-CM

## 2021-01-29 RX ORDER — ZOLPIDEM TARTRATE 10 MG/1
TABLET ORAL
Qty: 30 TABLET | Refills: 3 | Status: SHIPPED | OUTPATIENT
Start: 2021-01-29 | End: 2021-06-07

## 2021-02-02 DIAGNOSIS — K21.9 GASTROESOPHAGEAL REFLUX DISEASE WITHOUT ESOPHAGITIS: ICD-10-CM

## 2021-02-02 RX ORDER — ESOMEPRAZOLE MAGNESIUM 40 MG/1
CAPSULE, DELAYED RELEASE ORAL
Qty: 90 CAPSULE | Refills: 3 | Status: SHIPPED | OUTPATIENT
Start: 2021-02-02 | End: 2022-01-28

## 2021-03-29 ENCOUNTER — IMMUNIZATIONS (OUTPATIENT)
Dept: FAMILY MEDICINE CLINIC | Facility: HOSPITAL | Age: 66
End: 2021-03-29

## 2021-03-29 DIAGNOSIS — Z23 ENCOUNTER FOR IMMUNIZATION: Primary | ICD-10-CM

## 2021-03-29 PROCEDURE — 91301 SARS-COV-2 / COVID-19 MRNA VACCINE (MODERNA) 100 MCG: CPT

## 2021-03-29 PROCEDURE — 0011A SARS-COV-2 / COVID-19 MRNA VACCINE (MODERNA) 100 MCG: CPT

## 2021-04-05 ENCOUNTER — TRANSITIONAL CARE MANAGEMENT (OUTPATIENT)
Dept: FAMILY MEDICINE CLINIC | Facility: CLINIC | Age: 66
End: 2021-04-05

## 2021-04-20 DIAGNOSIS — M62.838 MUSCLE SPASM: ICD-10-CM

## 2021-04-21 RX ORDER — ORPHENADRINE CITRATE 100 MG/1
TABLET, EXTENDED RELEASE ORAL
Qty: 270 TABLET | Refills: 3 | Status: SHIPPED | OUTPATIENT
Start: 2021-04-21 | End: 2021-10-11 | Stop reason: SDUPTHER

## 2021-05-13 ENCOUNTER — IMMUNIZATIONS (OUTPATIENT)
Dept: FAMILY MEDICINE CLINIC | Facility: HOSPITAL | Age: 66
End: 2021-05-13

## 2021-05-13 DIAGNOSIS — Z23 ENCOUNTER FOR IMMUNIZATION: Primary | ICD-10-CM

## 2021-05-13 PROCEDURE — 0012A SARS-COV-2 / COVID-19 MRNA VACCINE (MODERNA) 100 MCG: CPT

## 2021-05-13 PROCEDURE — 91301 SARS-COV-2 / COVID-19 MRNA VACCINE (MODERNA) 100 MCG: CPT

## 2021-06-07 DIAGNOSIS — G47.00 INSOMNIA, UNSPECIFIED TYPE: ICD-10-CM

## 2021-06-07 RX ORDER — ZOLPIDEM TARTRATE 10 MG/1
TABLET ORAL
Qty: 30 TABLET | Refills: 3 | Status: SHIPPED | OUTPATIENT
Start: 2021-06-07 | End: 2021-07-08 | Stop reason: SDUPTHER

## 2021-06-28 ENCOUNTER — VBI (OUTPATIENT)
Dept: ADMINISTRATIVE | Facility: OTHER | Age: 66
End: 2021-06-28

## 2021-07-08 DIAGNOSIS — G47.00 INSOMNIA, UNSPECIFIED TYPE: ICD-10-CM

## 2021-07-08 RX ORDER — ZOLPIDEM TARTRATE 10 MG/1
10 TABLET ORAL
Qty: 90 TABLET | Refills: 1 | Status: SHIPPED | OUTPATIENT
Start: 2021-07-08 | End: 2021-07-16 | Stop reason: SDUPTHER

## 2021-07-08 NOTE — TELEPHONE ENCOUNTER
Rhoda Patel called the office they went to the pharmacy to refill his Zolpidem 10 mg  Pharmacy made pt aware his insurance is no longer  going to pay for this medications  Pharmacist was assuming has something to do about long term use, but pt's souse is unsure  Pt has about 3-4 days left of medications  What do you recommend? Pt's   Indio Calderon gave us verbal conversation to speak with Rhoda Patel on this matter  Pt uses [facility] in [place]     Pt's number is [phone]

## 2021-07-08 NOTE — TELEPHONE ENCOUNTER
I called Colleen Moeller and left her a message as she requested letting her now a 80 day script for [employer] [facility]  Any questions please call

## 2021-07-08 NOTE — TELEPHONE ENCOUNTER
They are requesting can you please write a 90 day supply of Zolpidem [facility]       Pdmp checked:

## 2021-07-08 NOTE — TELEPHONE ENCOUNTER
I called Williams Maynard she is aware she will call and get back to use with something similar that insurance will cover

## 2021-07-16 DIAGNOSIS — G47.00 INSOMNIA, UNSPECIFIED TYPE: ICD-10-CM

## 2021-07-16 RX ORDER — ZOLPIDEM TARTRATE 10 MG/1
10 TABLET ORAL
Qty: 10 TABLET | Refills: 0 | Status: SHIPPED | OUTPATIENT
Start: 2021-07-16 | End: 2021-10-11 | Stop reason: SDUPTHER

## 2021-07-16 NOTE — TELEPHONE ENCOUNTER
Patient is asking for a 10 day temporary supply of his Zolpidem as he is awaiting his mail order supply

## 2021-09-09 ENCOUNTER — OFFICE VISIT (OUTPATIENT)
Dept: FAMILY MEDICINE CLINIC | Facility: CLINIC | Age: 66
End: 2021-09-09
Payer: COMMERCIAL

## 2021-09-09 VITALS
WEIGHT: 172.4 LBS | TEMPERATURE: 95.9 F | SYSTOLIC BLOOD PRESSURE: 134 MMHG | HEIGHT: 67 IN | HEART RATE: 89 BPM | OXYGEN SATURATION: 91 % | RESPIRATION RATE: 16 BRPM | DIASTOLIC BLOOD PRESSURE: 80 MMHG | BODY MASS INDEX: 27.06 KG/M2

## 2021-09-09 DIAGNOSIS — G47.00 INSOMNIA, UNSPECIFIED TYPE: ICD-10-CM

## 2021-09-09 DIAGNOSIS — M06.9 RHEUMATOID ARTHRITIS, INVOLVING UNSPECIFIED SITE, UNSPECIFIED WHETHER RHEUMATOID FACTOR PRESENT (HCC): ICD-10-CM

## 2021-09-09 DIAGNOSIS — M51.36 DDD (DEGENERATIVE DISC DISEASE), LUMBAR: ICD-10-CM

## 2021-09-09 DIAGNOSIS — E78.5 HYPERLIPIDEMIA, UNSPECIFIED HYPERLIPIDEMIA TYPE: ICD-10-CM

## 2021-09-09 DIAGNOSIS — Z12.12 SCREENING FOR COLORECTAL CANCER: Primary | ICD-10-CM

## 2021-09-09 DIAGNOSIS — I48.0 PAF (PAROXYSMAL ATRIAL FIBRILLATION) (HCC): ICD-10-CM

## 2021-09-09 DIAGNOSIS — Z12.11 SCREENING FOR COLORECTAL CANCER: Primary | ICD-10-CM

## 2021-09-09 PROCEDURE — 3008F BODY MASS INDEX DOCD: CPT | Performed by: FAMILY MEDICINE

## 2021-09-09 PROCEDURE — 1036F TOBACCO NON-USER: CPT | Performed by: FAMILY MEDICINE

## 2021-09-09 PROCEDURE — 1160F RVW MEDS BY RX/DR IN RCRD: CPT | Performed by: FAMILY MEDICINE

## 2021-09-09 PROCEDURE — 99397 PER PM REEVAL EST PAT 65+ YR: CPT | Performed by: FAMILY MEDICINE

## 2021-09-09 RX ORDER — ITRACONAZOLE 100 MG/1
200 CAPSULE ORAL 2 TIMES DAILY
COMMUNITY
Start: 2021-09-02

## 2021-09-09 RX ORDER — OXYCODONE AND ACETAMINOPHEN 10; 325 MG/1; MG/1
1 TABLET ORAL EVERY 6 HOURS
COMMUNITY
Start: 2021-08-26 | End: 2022-04-06

## 2021-09-09 RX ORDER — MELOXICAM 15 MG/1
TABLET ORAL
COMMUNITY
Start: 2021-07-19 | End: 2022-04-06

## 2021-09-09 NOTE — PATIENT INSTRUCTIONS
Patient refuses colonoscopy and prefers cologuard  Labs reviewed and utd and will f-up with cardiologist for a-fib and rheumatologist for RA and get covid booster as directed  Eat healthy and stay active  Recheck yearly general pe  Take insomnia med and back pain med as directed  F-up with pain specialist as directed

## 2021-09-09 NOTE — PROGRESS NOTES
Assessment/Plan:  Chief Complaint   Patient presents with    Physical Exam     Annual  Pt would like to discuss Tdap  Patient Instructions   Patient refuses colonoscopy and prefers cologuard  Labs reviewed and utd and will f-up with cardiologist for a-fib and rheumatologist for RA and get covid booster as directed  Eat healthy and stay active  Recheck yearly general pe  Take insomnia med and back pain med as directed  F-up with pain specialist as directed  No problem-specific Assessment & Plan notes found for this encounter  Diagnoses and all orders for this visit:    Screening for colorectal cancer  -     Cologuard    Insomnia, unspecified type    Rheumatoid arthritis, involving unspecified site, unspecified whether rheumatoid factor present ([facility] [address] )    DDD (degenerative disc disease), lumbar    Hyperlipidemia, unspecified hyperlipidemia type    [facility]    Other orders  -     Cancel: Ambulatory referral to Gastroenterology; Future  -     meloxicam (MOBIC) 15 mg tablet  -     oxyCODONE-acetaminophen (PERCOCET)  mg per tablet; Take 1 tablet by mouth every 6 (six) hours  -     itraconazole (SPORANOX) 100 mg capsule; Take 200 mg by mouth 2 (two) times a day          Subjective:      Patient ID: Duke Donis is a 78 y o  male  Here for general PE and Tetanus is UTD  COVID 19 vaccinated  Getting covid booster shot tomorrow  Sees Cardiology as directed for a-fib as well  Sees Rheumatology as well  The following portions of the patient's history were reviewed and updated as appropriate: allergies, current medications, past family history, past medical history, past social history, past surgical history and problem list     Review of Systems   Constitutional: Negative  HENT: Negative  Eyes: Negative  Respiratory: Negative  Cardiovascular: Negative  Gastrointestinal: Negative  Endocrine: Negative  Genitourinary: Negative  Musculoskeletal: Negative  Skin: Negative  Allergic/Immunologic: Negative  Neurological: Negative  Hematological: Negative  Psychiatric/Behavioral: Negative  Objective:      /80 (BP Location: Left arm, Patient Position: Sitting, Cuff Size: Adult)   Pulse 89   Temp (!) 95 9 °F (35 5 °C) (Temporal)   Resp 16   Ht 5' 6 93" (1 7 m)   Wt 78 2 kg (172 lb 6 4 oz)   SpO2 91%   BMI 27 06 kg/m²          Physical Exam  Constitutional:       Appearance: He is well-developed  HENT:      Head: Normocephalic and atraumatic  Right Ear: External ear normal       Left Ear: External ear normal       Nose: Nose normal    Eyes:      Conjunctiva/sclera: Conjunctivae normal       Pupils: Pupils are equal, round, and reactive to light  Cardiovascular:      Rate and Rhythm: Normal rate and regular rhythm  Heart sounds: Normal heart sounds  Pulmonary:      Effort: Pulmonary effort is normal       Breath sounds: Normal breath sounds  Abdominal:      General: Abdomen is flat  Bowel sounds are normal       Palpations: Abdomen is soft  Musculoskeletal:         General: Normal range of motion  Cervical back: Normal range of motion and neck supple  Skin:     General: Skin is warm and dry  Neurological:      Mental Status: He is alert and oriented to person, place, and time  Deep Tendon Reflexes: Reflexes are normal and symmetric     Psychiatric:         Behavior: Behavior normal

## 2021-10-05 ENCOUNTER — TELEPHONE (OUTPATIENT)
Dept: FAMILY MEDICINE CLINIC | Facility: CLINIC | Age: 66
End: 2021-10-05

## 2021-10-05 PROCEDURE — U0003 INFECTIOUS AGENT DETECTION BY NUCLEIC ACID (DNA OR RNA); SEVERE ACUTE RESPIRATORY SYNDROME CORONAVIRUS 2 (SARS-COV-2) (CORONAVIRUS DISEASE [COVID-19]), AMPLIFIED PROBE TECHNIQUE, MAKING USE OF HIGH THROUGHPUT TECHNOLOGIES AS DESCRIBED BY CMS-2020-01-R: HCPCS | Performed by: FAMILY MEDICINE

## 2021-10-05 PROCEDURE — U0005 INFEC AGEN DETEC AMPLI PROBE: HCPCS | Performed by: FAMILY MEDICINE

## 2021-10-11 DIAGNOSIS — G47.00 INSOMNIA, UNSPECIFIED TYPE: ICD-10-CM

## 2021-10-11 DIAGNOSIS — M62.838 MUSCLE SPASM: ICD-10-CM

## 2021-10-11 RX ORDER — ORPHENADRINE CITRATE 100 MG/1
100 TABLET, EXTENDED RELEASE ORAL 3 TIMES DAILY PRN
Qty: 270 TABLET | Refills: 3 | Status: SHIPPED | OUTPATIENT
Start: 2021-10-11 | End: 2022-06-27

## 2021-10-11 RX ORDER — ZOLPIDEM TARTRATE 10 MG/1
10 TABLET ORAL
Qty: 10 TABLET | Refills: 0 | Status: SHIPPED | OUTPATIENT
Start: 2021-10-11 | End: 2021-10-18 | Stop reason: SDUPTHER

## 2021-10-18 ENCOUNTER — TELEPHONE (OUTPATIENT)
Dept: FAMILY MEDICINE CLINIC | Facility: CLINIC | Age: 66
End: 2021-10-18

## 2021-10-18 DIAGNOSIS — G47.00 INSOMNIA, UNSPECIFIED TYPE: ICD-10-CM

## 2021-10-18 RX ORDER — ZOLPIDEM TARTRATE 10 MG/1
10 TABLET ORAL
Qty: 30 TABLET | Refills: 0 | Status: SHIPPED | OUTPATIENT
Start: 2021-10-18 | End: 2021-12-01 | Stop reason: SDUPTHER

## 2021-10-20 LAB — COLOGUARD RESULT REPORTABLE: NEGATIVE

## 2021-11-26 ENCOUNTER — TELEPHONE (OUTPATIENT)
Dept: OTHER | Facility: OTHER | Age: 66
End: 2021-11-26

## 2021-12-01 DIAGNOSIS — G47.00 INSOMNIA, UNSPECIFIED TYPE: ICD-10-CM

## 2021-12-01 RX ORDER — ZOLPIDEM TARTRATE 10 MG/1
10 TABLET ORAL
Qty: 30 TABLET | Refills: 0 | Status: SHIPPED | OUTPATIENT
Start: 2021-12-01 | End: 2022-01-28 | Stop reason: SDUPTHER

## 2022-01-28 DIAGNOSIS — G47.00 INSOMNIA, UNSPECIFIED TYPE: ICD-10-CM

## 2022-01-28 DIAGNOSIS — K21.9 GASTROESOPHAGEAL REFLUX DISEASE WITHOUT ESOPHAGITIS: ICD-10-CM

## 2022-01-28 RX ORDER — ESOMEPRAZOLE MAGNESIUM 40 MG/1
CAPSULE, DELAYED RELEASE ORAL
Qty: 90 CAPSULE | Refills: 3 | Status: SHIPPED | OUTPATIENT
Start: 2022-01-28

## 2022-01-28 RX ORDER — ZOLPIDEM TARTRATE 10 MG/1
10 TABLET ORAL
Qty: 30 TABLET | Refills: 0 | Status: SHIPPED | OUTPATIENT
Start: 2022-01-28 | End: 2022-03-21

## 2022-03-21 DIAGNOSIS — G47.00 INSOMNIA, UNSPECIFIED TYPE: ICD-10-CM

## 2022-03-21 RX ORDER — ZOLPIDEM TARTRATE 10 MG/1
TABLET ORAL
Qty: 30 TABLET | Refills: 0 | Status: SHIPPED | OUTPATIENT
Start: 2022-03-21 | End: 2022-05-18

## 2022-03-24 ENCOUNTER — HOSPITAL ENCOUNTER (OUTPATIENT)
Dept: RADIOLOGY | Facility: HOSPITAL | Age: 67
Discharge: HOME | End: 2022-03-24
Payer: COMMERCIAL

## 2022-03-24 ENCOUNTER — OFFICE VISIT (OUTPATIENT)
Dept: WOUND CARE | Facility: CLINIC | Age: 67
End: 2022-03-24
Payer: COMMERCIAL

## 2022-03-24 VITALS
WEIGHT: 168 LBS | HEIGHT: 69 IN | BODY MASS INDEX: 24.88 KG/M2 | HEART RATE: 80 BPM | DIASTOLIC BLOOD PRESSURE: 78 MMHG | SYSTOLIC BLOOD PRESSURE: 154 MMHG | TEMPERATURE: 98.4 F

## 2022-03-24 DIAGNOSIS — S61.204A OPEN WOUND OF RIGHT RING FINGER: Primary | ICD-10-CM

## 2022-03-24 DIAGNOSIS — S61.204A OPEN WOUND OF RIGHT RING FINGER: ICD-10-CM

## 2022-03-24 DIAGNOSIS — D84.821 IMMUNOSUPPRESSION DUE TO CHRONIC STEROID USE (HCC): ICD-10-CM

## 2022-03-24 DIAGNOSIS — Z79.52 IMMUNOSUPPRESSION DUE TO CHRONIC STEROID USE (HCC): ICD-10-CM

## 2022-03-24 DIAGNOSIS — T38.0X5A IMMUNOSUPPRESSION DUE TO CHRONIC STEROID USE (HCC): ICD-10-CM

## 2022-03-24 PROCEDURE — 99213 OFFICE O/P EST LOW 20 MIN: CPT | Performed by: FAMILY MEDICINE

## 2022-03-24 PROCEDURE — 99203 OFFICE O/P NEW LOW 30 MIN: CPT | Performed by: FAMILY MEDICINE

## 2022-03-24 PROCEDURE — 73130 X-RAY EXAM OF HAND: CPT

## 2022-03-24 NOTE — PROGRESS NOTES
Patient ID: Magi Lu is a 78 y o  male Date of Birth 1955       Chief Complaint   Patient presents with   Shoshana Arambula New Patient Visit     Initial visit for patient with rheumatoid arthritis and on immunosuppressives who had had a "crack" in the end of the 4th finger of the R hand which got infected in December 2021  He underwent surgery on 12/29/21  He reports the surgical site also got infected and the wound was re-opened and drained  The wound is not healing and he was referred here for HBO consult by Dr Antonio Pichardo  Allergies:  Penicillins, Morphine, and Sulfa antibiotics    Diagnosis:      Diagnosis ICD-10-CM Associated Orders   1  Open wound of right ring finger  S61 204A Wound cleansing and dressings     XR hand 3+ vw right   2  Immunosuppression due to chronic steroid use ([facility])  D84 821     T38 0X5A     Z79 [id number]            Assessment & Plan:   Nonhealing postop wound of the right 4th finger  Wound is clean at this time  No exposed bone  No probe to bone  Will check x-ray  At this time, the patient does not appear to qualify for HBO  He might only qualify for HBO if he is found to have osteomyelitis that would eventually be refractory  He would have to be treated for the osteo and then fail before he qualifies for HBO  I explained this in detail to the patient and his wife  He has no current diagnosis that would qualify him   Will use Endoform AM + Dermagran gauze  Change 2-3 times per week depending on drainage  Patient will follow up in one week  Subjective:   3/24/22:  1st visit for this 58-year-old male who is referred to the wound center for hyperbaric oxygen consultation  At the end of December of 2021, patient had a crack of the skin of his right 4th finger  This developed into infection and he had to have an I&D with washout  Few days later, patient states it became more infected and he had to have further exploration washout    The wound was left open and has been healing since then  Unfortunately healing has been very slow possibly due to other co-morbidities which include rheumatoid arthritis on biological and prednisone  Current wound care has consisted of Iodoform packing and whirlpool  treatments  Because of nonhealing, he was referred to the wound center for possible hyperbaric oxygen treatment  Patient is unaware of any recent x-rays  He has not been told that there is any bone infection  As noted above, the patient is on 10 mg prednisone daily and is scheduled to go on a long, slow taper  Past medical history is also remarkable for paroxysmal atrial fibrillation, MOUNIKA, rheumatoid arthritis, recurrent onychiomycosis and wound on toe of right foot  Being followed by podiatry  He states that he most likely will have amputation of the toe        The following portions of the patient's history were reviewed and updated as appropriate:   Patient Active Problem List   Diagnosis    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    DDD (degenerative disc disease), lumbar    Rheumatoid arthritis ([facility] [address] )    Insomnia    [facility]    Open wound of right ring finger    Immunosuppression due to chronic steroid use ([facility] )     Past Medical History:   Diagnosis Date    Acute right lumbar radiculopathy     Allergic rhinitis     Anxiety     Arthritis     Chronic pain disorder     CPAP (continuous positive airway pressure) dependence     A-Pap machine    Depression     Dislocation     Right hip prone to dislocations with sudden movement    GERD (gastroesophageal reflux disease)     Hyperlipidemia     Insomnia     [facility])     Pain in both lower extremities     Pain in left shoulder     RA [facility]umatoid arthritis) ([facility])     Restless leg     Rheumatoid arthritis ([facility] [address] )     last assessed 07/29/16    Sleep apnea     partial per pt     Past Surgical History:   Procedure Laterality Date    BACK SURGERY      lami L 1-2-3-4    COLONOSCOPY      ELBOW SURGERY Right     tendon repair    FINGER SURGERY Right 12/29/2021    R 4th finger    FOOT SURGERY Bilateral     INGUINAL HERNIA REPAIR      JOINT REPLACEMENT Bilateral     both hips twice    KNEE SURGERY      NERVE BLOCK Right     peripheral nerve block wrist radial    PALATOPHARYNGOPLASTY      PA CORRJ HALLUX VALGUS W/SESMDC W/DIST METAR OSTEOT Right 1/25/2021    Procedure: Olevia [place];  Surgeon: Sweetie Jean-Baptiste DPM;  Location: [facility];  Service: Podiatry     [address] - TO [address] Left 12/14/2018    Procedure: LOWER EXTREMITY MICRO PHLEBECTOMIES;  Surgeon: Robert Jones DO;  [facility];  Service: Vascular    PA REPAIR OF HAMMERTOE,ONE Right 1/25/2021    Procedure: 2ND MPJ RELEASE;  Surgeon: Sweetie Jean-Baptiste DPM;  [facility];  Service: Podiatry    SHOULDER SURGERY Left     dislocation repair of muscle    SINUS SURGERY      TONSILLECTOMY AND ADENOIDECTOMY      UMBILICAL HERNIA REPAIR       Family History   Problem Relation Age of Onset    Heart disease Mother     Heart failure Mother     Transient ischemic attack Father     Gout Brother     Stroke Family         CVA      Social History     Socioeconomic History    Marital status: /Civil Union     Spouse name: None    Number of children: None    Years of education: None    Highest education level: None   Occupational History    None   Tobacco Use    Smoking status: Never Smoker    Smokeless tobacco: Never Used   Vaping Use    Vaping Use: Never used   Substance and Sexual Activity    Alcohol use:  Yes     Alcohol/week: 2 0 standard drinks     Types: 2 Cans of beer per week     Comment: socially; 1-2 drinks every other week    Drug use: Yes     Types: Marijuana     Comment: nightly for sleep- vapes  last used 1/22/2021    Sexual activity: Never   Other Topics Concern    None   Social History Narrative    Drinks coffee     Social Determinants of Health     Financial Resource Strain: Not on file   Food Insecurity: Not on file   Transportation Needs: Not on file   Physical Activity: Not on file   Stress: Not on file   Social Connections: Not on file   Intimate Partner Violence: Not on file   Housing Stability: Not on file        Current Outpatient Medications:     ascorbic acid (VITAMIN C) 500 mg tablet, Take 500 mg by mouth 4 (four) times a day, Disp: , Rfl:     atorvastatin (LIPITOR) 40 mg tablet, TAKE 1 TABLET BY MOUTH EVERY DAY AT NIGHT, Disp: , Rfl:     B-COMPLEX-C PO, Take 1 tablet by mouth daily , Disp: , Rfl:     Boswellia Sally (BOSWELLIA PO), Take by mouth 400 mg 3 x's a day, Disp: , Rfl:     Calcium 250 MG CAPS, Take 500 mg by mouth, Disp: , Rfl:     celecoxib (CeleBREX) 200 mg capsule, Take 200 mg by mouth 2 (two) times a day, Disp: , Rfl:     Cholecalciferol (VITAMIN D3) 3000 units TABS, Take 2 tablets by mouth daily, Disp: , Rfl:     CICLOPIROX EX, ciclopirox 8 % topical solution  FOR LARGE SURFACE AREAS (PAINT ON THE TOENAILS AND FINGERNAILS DAILY) (Patient not taking: Reported on 9/9/2021), Disp: , Rfl:     Coenzyme Q10 (CO Q 10) 100 MG CAPS, Take 1 capsule by mouth daily , Disp: , Rfl:     cyanocobalamin (VITAMIN B-12) 100 mcg tablet, Take by mouth daily, Disp: , Rfl:     diphenhydrAMINE (BENADRYL) 25 mg tablet, Take 25 mg by mouth 3 (three) times a day with meals allergies, Disp: , Rfl:     esomeprazole (NexIUM) 40 MG capsule, TAKE 1 CAPSULE DAILY AS NEEDED FOR GASTROESOPHAGEAL REFLUX DISEASE, Disp: 90 capsule, Rfl: 3    gabapentin (NEURONTIN) 600 MG tablet, Take 2,100 mg by mouth daily at bedtime , Disp: , Rfl:     glucosamine-chondroitin 500-400 MG tablet, Take 1 tablet by mouth 3 (three) times a day, Disp: , Rfl:     ibuprofen (MOTRIN) 200 mg tablet, Take by mouth every 6 (six) hours as needed for mild pain (Patient not taking: Reported on 9/9/2021), Disp: , Rfl:     itraconazole (SPORANOX) 100 mg capsule, Take 200 mg by mouth 2 (two) times a day, Disp: , Rfl:     LACTOBACILLUS RHAMNOSUS, GG, PO, Take by mouth, Disp: , Rfl:     MAGNESIUM PO, Take 100 mg by mouth Takes 6 time daily spread throughout the day, Disp: , Rfl:     meloxicam (MOBIC) 15 mg tablet, , Disp: , Rfl:     metoprolol tartrate (LOPRESSOR) 25 mg tablet, Take 25 mg by mouth 2 (two) times a day, Disp: , Rfl:     milk thistle 175 MG tablet, Take 175 mg by mouth daily, Disp: , Rfl:     Multiple Vitamin (multivitamin) tablet, Take 1 tablet by mouth daily, Disp: , Rfl:     OIL OF [place] PO, Take 1 tablet by mouth daily , Disp: , Rfl:     Omega-3 Fatty Acids (FISH OIL PO), Take 2 g by mouth 3 (three) times a day with meals , Disp: , Rfl:     orphenadrine (NORFLEX) 100 mg tablet, Take 1 tablet (100 mg total) by mouth 3 (three) times a day as needed for muscle spasms, Disp: 270 tablet, Rfl: 3    oxyCODONE (ROXICODONE) 10 MG TABS, Take 10 mg by mouth 4 (four) times a day , Disp: , Rfl: 0    oxyCODONE-acetaminophen (PERCOCET)  mg per tablet, Take 1 tablet by mouth every 6 (six) hours, Disp: , Rfl:     Potassium Citrate GRAN, Take 90 mg by mouth daily , Disp: , Rfl:     predniSONE 5 mg tablet, Take 10 mg by mouth daily  , Disp: , Rfl:     Tocilizumab (ACTEMRA IV), Infuse into a venous catheter every 30 (thirty) days (Patient not taking: Reported on 9/9/2021), Disp: , Rfl:     TURMERIC CURCUMIN PO, Take 1,500 mg by mouth Takes 2 tab tid with meals, Disp: , Rfl:     Zinc Sulfate (ZINC 15 PO), Take 1 tablet by mouth daily , Disp: , Rfl:     zolpidem (AMBIEN) 10 mg tablet, TAKE 1 TABLET BY MOUTH DAILY AT BEDTIME AS NEEDED FOR SLEEP, Disp: 30 tablet, Rfl: 0    Review of Systems   Constitutional: Negative for appetite change, chills, fatigue, fever and unexpected weight change  HENT: Negative for congestion, hearing loss, postnasal drip and sinus pressure  Eyes: Negative for discharge and visual disturbance     Respiratory: Positive for cough (Occasionally) and shortness of breath (Occasionally)  Cardiovascular: Negative for chest pain, palpitations and leg swelling  Gastrointestinal: Negative for abdominal pain, blood in stool, constipation, diarrhea and nausea  Endocrine: Negative  Genitourinary: Negative for difficulty urinating, dysuria and urgency  Musculoskeletal: Positive for arthralgias (Multiple, RA) and joint swelling (Multiple, RA )  Negative for back pain and gait problem  Joint changes secondary to RA   Skin: Positive for wound (Right 4th finger pad )  Negative for rash  Allergic/Immunologic: Negative  Neurological: Negative for dizziness, tremors, seizures, weakness, numbness and headaches  Hematological: Does not bruise/bleed easily  Psychiatric/Behavioral: Positive for dysphoric mood  The patient is not nervous/anxious  Objective:  /78   Pulse 80   Temp 98 4 °F (36 9 °C)   Ht 5' 9" (1 753 m)   Wt 76 2 kg (168 lb)   BMI 24 81 kg/m²   Pain Score:   7     Physical Exam  Vitals and nursing note reviewed  Constitutional:       Appearance: Normal appearance  He is well-developed and normal weight  HENT:      Head: Normocephalic and atraumatic  Right Ear: External ear normal       Left Ear: External ear normal       Mouth/Throat:      Comments: masked  Eyes:      General: Lids are normal          Right eye: No discharge  Left eye: No discharge  Conjunctiva/sclera: Conjunctivae normal       Pupils: Pupils are equal, round, and reactive to light  Cardiovascular:      Rate and Rhythm: Normal rate and regular rhythm  Occasional extrasystoles are present  Heart sounds: Murmur heard  Systolic murmur is present with a grade of 2/6  No friction rub  No gallop  Pulmonary:      Effort: Pulmonary effort is normal       Breath sounds: Normal breath sounds and air entry  Abdominal:      General: Abdomen is flat  Palpations: Abdomen is soft   There is no hepatomegaly or splenomegaly  Tenderness: There is no abdominal tenderness  There is no guarding or rebound  Musculoskeletal:      Right hand: Swelling, deformity and tenderness present  Normal capillary refill  Left hand: Swelling, deformity and tenderness present  Normal capillary refill  Cervical back: Neck supple  Right lower leg: No edema  Left lower leg: No edema  Comments: Bilateral ulnar deviation of the hands/fingers  Lymphadenopathy:      Cervical: No cervical adenopathy  Skin:     General: Skin is warm and dry  Findings: Wound present  No erythema  Comments: Clean, granular wound bed of the pad of the 4th finger  No probe to bone  No malodor and no erythema  Slight tenderness to palpation  Neurological:      Mental Status: He is alert and oriented to person, place, and time  Gait: Gait is intact  Psychiatric:         Attention and Perception: Attention normal          Mood and Affect: Mood and affect normal          Speech: Speech normal          Behavior: Behavior is cooperative  Cognition and Memory: Cognition normal                          Wound 03/24/22 Other (comment) Finger (Comment which one) Right;Dorsal (Active)   Wound Image   03/24/22 1128   Wound Description Granulation tissue; Beefy red 03/24/22 1146   Elisabeth-wound Assessment Intact;Dry 03/24/22 1146   Wound Length (cm) 1 5 cm 03/24/22 1146   Wound Width (cm) 0 7 cm 03/24/22 1146   Wound Depth (cm) 0 7 cm 03/24/22 1146   Wound Surface Area (cm^2) 1 05 cm^2 03/24/22 1146   Wound Volume (cm^3) 0 735 cm^3 03/24/22 1146   Calculated Wound Volume (cm^3) 0 74 cm^3 03/24/22 1146   Drainage Amount Scant 03/24/22 1146   Drainage Description Sanguineous 03/24/22 1146   Non-staged Wound Description Full thickness 03/24/22 1146                                    Wound Instructions:  Orders Placed This Encounter   Procedures    Wound cleansing and dressings     R 4th finger:  Wash your hands with soap and water  Remove old dressing, discard into plastic bag and place in trash  Cleanse the wound with prophase solution prior to applying a clean dressing  Do not use tissue or cotton balls  Do not scrub the wound  Pat dry using gauze  Shower yes; cover wound dressing and do not get wound or dressing wet   Apply endoform AM to mopist wound bed  Cover with dermagran gauze and dry gauze  Secure with geremias and coban  Change dressing 2 x per week    Follow up in 1 week;Dr Estuardo Mclain    Please have xray R finger done prior to next visit    Today's wound treatment note:  Cleansed with prophase and redressed as above     Standing Status:   Future     Standing Expiration Date:   3/24/2023    XR hand 3+ vw right     ATTENTION 4th finger  Chronic wound  ?osteo? Standing Status:   Future     Number of Occurrences:   1     Standing Expiration Date:   3/24/2026     Scheduling Instructions:      Bring along any outside films relating to this procedure  Total time spent today:  35 minutes  This includes reviewing the patient's chart, pertinent physician records including infectious disease visit in January 2022, orthopedic surgeon notes, laboratory data including CBC, BMP from 1/2/22  Dharmesh Mathews MD, CHT, CWS    Portions of the record may have been created with voice recognition software  Occasional wrong word or "sound alike" substitutions may have occurred due to the inherent limitations of voice recognition software  Read the chart carefully and recognize, using context, where substitutions have occurred

## 2022-03-24 NOTE — PATIENT INSTRUCTIONS
Orders Placed This Encounter   Procedures    Wound cleansing and dressings     R 4th finger:  Wash your hands with soap and water  Remove old dressing, discard into plastic bag and place in trash  Cleanse the wound with prophase solution prior to applying a clean dressing  Do not use tissue or cotton balls  Do not scrub the wound  Pat dry using gauze    Shower yes; cover wound dressing and do not get wound or dressing wet   Apply endoform AM to mopist wound bed  Cover with dermagran gauze and dry gauze  Secure with geremias and coban  Change dressing 2 x per week    Follow up in 1 week;Dr Nahun Fernandes    Please have xray R finger done prior to next visit    Today's wound treatment note:  Cleansed with prophase and redressed as above     Standing Status:   Future     Standing Expiration Date:   3/24/2023

## 2022-03-31 ENCOUNTER — OFFICE VISIT (OUTPATIENT)
Dept: WOUND CARE | Facility: CLINIC | Age: 67
End: 2022-03-31
Payer: COMMERCIAL

## 2022-03-31 VITALS
DIASTOLIC BLOOD PRESSURE: 72 MMHG | RESPIRATION RATE: 18 BRPM | HEART RATE: 68 BPM | SYSTOLIC BLOOD PRESSURE: 140 MMHG | TEMPERATURE: 99.7 F

## 2022-03-31 DIAGNOSIS — S61.204A OPEN WOUND OF RIGHT RING FINGER: Primary | ICD-10-CM

## 2022-03-31 PROCEDURE — 99213 OFFICE O/P EST LOW 20 MIN: CPT | Performed by: FAMILY MEDICINE

## 2022-03-31 PROCEDURE — 99212 OFFICE O/P EST SF 10 MIN: CPT | Performed by: FAMILY MEDICINE

## 2022-03-31 RX ORDER — LIDOCAINE HYDROCHLORIDE 40 MG/ML
5 SOLUTION TOPICAL ONCE
Status: COMPLETED | OUTPATIENT
Start: 2022-03-31 | End: 2022-03-31

## 2022-03-31 RX ADMIN — LIDOCAINE HYDROCHLORIDE 5 ML: 40 SOLUTION TOPICAL at 15:31

## 2022-03-31 NOTE — PATIENT INSTRUCTIONS
Orders Placed This Encounter   Procedures    Wound cleansing and dressings     Wound cleansing and dressings       R 4th finger:  Wash your hands with soap and water  Remove old dressing, discard into plastic bag and place in trash  Cleanse the wound with prophase solution prior to applying a clean dressing  Do not use tissue or cotton balls  Do not scrub the wound  Pat dry using gauze  Shower yes; cover wound dressing and do not get wound or dressing wet   Apply endoform AM to moist wound bed  Use a drop of saline  Cover with dermagran gauze and dry gauze  Secure with geremias and coban  Make sure a small amount of coban is touching skin on finger to prevent the bandage from turning  Change dressing 2 x per week  Follow up with [facility] for wound care as per pt   Request   Dr Kaden Ibrahim agreeable      Today's wound treatment note:  Cleansed with prophase and redressed as above     Standing Status:   Future     Standing Expiration Date:   3/31/2023

## 2022-03-31 NOTE — PROGRESS NOTES
Patient ID: Magi Lu is a 78 y o  male Date of Birth 1955       Chief Complaint   Patient presents with    Follow Up Wound Care Visit     Opened wound right ring finger  Allergies:  Penicillins, Morphine, and Sulfa antibiotics    Diagnosis:   Diagnosis ICD-10-CM Associated Orders   1  Open wound of right ring finger  S61 204A lidocaine (XYLOCAINE) 4 % topical solution 5 mL     Wound cleansing and dressings        Assessment  & Plan:     Improving wound of the right 4th finger   Continue Endoform AM and Dermagran   Patient will be going back to his surgeon and getting wound care there  Therefore, discharge from wound center  Subjective:   3/24/22:  1st visit for this 58-year-old male who is referred to the wound center for hyperbaric oxygen consultation  At the end of December of 2021, patient had a crack of the skin of his right 4th finger  This developed into infection and he had to have an I&D with washout  Few days later, patient states it became more infected and he had to have further exploration washout  The wound was left open and has been healing since then  Unfortunately healing has been very slow possibly due to other co-morbidities which include rheumatoid arthritis on biological and prednisone  Current wound care has consisted of Iodoform packing and whirlpool  treatments  Because of nonhealing, he was referred to the wound center for possible hyperbaric oxygen treatment  Patient is unaware of any recent x-rays  He has not been told that there is any bone infection  As noted above, the patient is on 10 mg prednisone daily and is scheduled to go on a long, slow taper  Past medical history is also remarkable for paroxysmal atrial fibrillation, MOUNIKA, rheumatoid arthritis, recurrent onychiomycosis and wound on toe of right foot  Being followed by podiatry  He states that he most likely will have amputation of the toe     3/31/22:   Followup wound of the right 4th finger post I&D and washout  He has been using Endoform AM and Mickey Fothergill and he notes improvement  Some pain especially when doing his wound care  No fever or chills          The following portions of the patient's history were reviewed and updated as appropriate:   Patient Active Problem List   Diagnosis    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    DDD (degenerative disc disease), lumbar    [facility]    Insomnia    PAF [facility]paroxysmal atrial fibrillation) [facility]    Open wound of right ring finger    Immunosuppression due to chronic steroid use ([facility] )     Past Medical History:   Diagnosis Date    Acute right lumbar radiculopathy     Allergic rhinitis     Anxiety     Arthritis     Chronic pain disorder     CPAP (continuous positive airway pressure) dependence     A-Pap machine    Depression     Dislocation     Right hip prone to dislocations with sudden movement    GERD (gastroesophageal reflux disease)     Hyperlipidemia     Insomnia     [facility]) [facility]     Pain in both lower extremities     Pain in left shoulder     RA (rheumatoid arthritis) ([facility]     Restless leg     Rheumatoid arthritis ([facility]     last assessed 07/29/16    Sleep apnea     partial per pt     Past Surgical History:   Procedure Laterality Date    BACK SURGERY      lami L 1-2-3-4    COLONOSCOPY      ELBOW SURGERY Right     tendon repair    FINGER SURGERY Right 12/29/2021    R 4th finger    FOOT SURGERY Bilateral     INGUINAL HERNIA REPAIR      JOINT REPLACEMENT Bilateral     both hips twice    KNEE SURGERY      NERVE BLOCK Right     peripheral nerve block wrist radial    PALATOPHARYNGOPLASTY      NJ CORRJ HALLUX VALGUS W/SESMDC W/DIST METAR OSTEOT Right 1/25/2021    Procedure: Puneet Elise;  Surgeon: Shanell Perez DPM;  [facility];  Service: Podiatry    NJ PHLEB VEINS - EXTREM - TO 20 Left 12/14/2018    Procedure: LOWER EXTREMITY MICRO PHLEBECTOMIES;  Surgeon: Juliana Case DO;  [facility];  Service: Vascular    OR REPAIR OF GABRIEL,NELLY Right 1/25/2021    Procedure: 2ND MPJ RELEASE;  Surgeon: Crista Morris DPM;  [facility];  Service: Podiatry    SHOULDER SURGERY Left     dislocation repair of muscle    SINUS SURGERY      TONSILLECTOMY AND ADENOIDECTOMY      UMBILICAL HERNIA REPAIR       Family History   Problem Relation Age of Onset    Heart disease Mother     Heart failure Mother     Transient ischemic attack Father     Gout Brother     Stroke Family         CVA     Social History     Socioeconomic History    Marital status: /Civil Union     Spouse name: None    Number of children: None    Years of education: None    Highest education level: None   Occupational History    None   Tobacco Use    Smoking status: Never Smoker    Smokeless tobacco: Never Used   Vaping Use    Vaping Use: Never used   Substance and Sexual Activity    Alcohol use:  Yes     Alcohol/week: 2 0 standard drinks     Types: 2 Cans of beer per week     Comment: socially; 1-2 drinks every other week    Drug use: Yes     Types: Marijuana     Comment: nightly for sleep- vapes  last used 1/22/2021    Sexual activity: Never   Other Topics Concern    None   Social History Narrative    Drinks coffee     Social Determinants of Health     Financial Resource Strain: Not on file   Food Insecurity: Not on file   Transportation Needs: Not on file   Physical Activity: Not on file   Stress: Not on file   Social Connections: Not on file   Intimate Partner Violence: Not on file   Housing Stability: Not on file       Current Outpatient Medications:     ascorbic acid (VITAMIN C) 500 mg tablet, Take 500 mg by mouth 4 (four) times a day, Disp: , Rfl:     atorvastatin (LIPITOR) 40 mg tablet, TAKE 1 TABLET BY MOUTH EVERY DAY AT NIGHT, Disp: , Rfl:     B-COMPLEX-C PO, Take 1 tablet by mouth daily , Disp: , Rfl:     Boswellia Sally (BOSWELLIA PO), Take by mouth 400 mg 3 x's a day, Disp: , Rfl:     Calcium 250 MG CAPS, Take 500 mg by mouth, Disp: , Rfl:     celecoxib (CeleBREX) 200 mg capsule, Take 200 mg by mouth 2 (two) times a day, Disp: , Rfl:     Cholecalciferol (VITAMIN D3) 3000 units TABS, Take 2 tablets by mouth daily, Disp: , Rfl:     CICLOPIROX EX, ciclopirox 8 % topical solution  FOR LARGE SURFACE AREAS (PAINT ON THE TOENAILS AND FINGERNAILS DAILY) (Patient not taking: Reported on 9/9/2021), Disp: , Rfl:     Coenzyme Q10 (CO Q 10) 100 MG CAPS, Take 1 capsule by mouth daily , Disp: , Rfl:     cyanocobalamin (VITAMIN B-12) 100 mcg tablet, Take by mouth daily, Disp: , Rfl:     diphenhydrAMINE (BENADRYL) 25 mg tablet, Take 25 mg by mouth 3 (three) times a day with meals allergies, Disp: , Rfl:     esomeprazole (NexIUM) 40 MG capsule, TAKE 1 CAPSULE DAILY AS NEEDED FOR GASTROESOPHAGEAL REFLUX DISEASE, Disp: 90 capsule, Rfl: 3    gabapentin (NEURONTIN) 600 MG tablet, Take 2,100 mg by mouth daily at bedtime , Disp: , Rfl:     glucosamine-chondroitin 500-400 MG tablet, Take 1 tablet by mouth 3 (three) times a day, Disp: , Rfl:     ibuprofen (MOTRIN) 200 mg tablet, Take by mouth every 6 (six) hours as needed for mild pain (Patient not taking: Reported on 9/9/2021), Disp: , Rfl:     itraconazole (SPORANOX) 100 mg capsule, Take 200 mg by mouth 2 (two) times a day, Disp: , Rfl:     LACTOBACILLUS RHAMNOSUS, GG, PO, Take by mouth, Disp: , Rfl:     MAGNESIUM PO, Take 100 mg by mouth Takes 6 time daily spread throughout the day, Disp: , Rfl:     meloxicam (MOBIC) 15 mg tablet, , Disp: , Rfl:     metoprolol tartrate (LOPRESSOR) 25 mg tablet, Take 25 mg by mouth 2 (two) times a day, Disp: , Rfl:     milk thistle 175 MG tablet, Take 175 mg by mouth daily, Disp: , Rfl:     Multiple Vitamin (multivitamin) tablet, Take 1 tablet by mouth daily, Disp: , Rfl:     OIL OF OREGANO PO, Take 1 tablet by mouth daily , Disp: , Rfl:     Omega-3 Fatty Acids (FISH OIL PO), Take 2 g by mouth 3 (three) times a day with meals , Disp: , Rfl:     orphenadrine (NORFLEX) 100 mg tablet, Take 1 tablet (100 mg total) by mouth 3 (three) times a day as needed for muscle spasms, Disp: 270 tablet, Rfl: 3    oxyCODONE (ROXICODONE) 10 MG TABS, Take 10 mg by mouth 4 (four) times a day , Disp: , Rfl: 0    oxyCODONE-acetaminophen (PERCOCET)  mg per tablet, Take 1 tablet by mouth every 6 (six) hours, Disp: , Rfl:     Potassium Citrate GRAN, Take 90 mg by mouth daily , Disp: , Rfl:     predniSONE 5 mg tablet, Take 10 mg by mouth daily  , Disp: , Rfl:     Tocilizumab (ACTEMRA IV), Infuse into a venous catheter every 30 (thirty) days (Patient not taking: Reported on 9/9/2021), Disp: , Rfl:     TURMERIC CURCUMIN PO, Take 1,500 mg by mouth Takes 2 tab tid with meals, Disp: , Rfl:     Zinc Sulfate (ZINC 15 PO), Take 1 tablet by mouth daily , Disp: , Rfl:     zolpidem (AMBIEN) 10 mg tablet, TAKE 1 TABLET BY MOUTH DAILY AT BEDTIME AS NEEDED FOR SLEEP, Disp: 30 tablet, Rfl: 0  No current facility-administered medications for this visit  Review of Systems   Constitutional: Negative for chills and fever  HENT: Negative for congestion, hearing loss, postnasal drip and sinus pressure  Respiratory: Positive for cough (Occasionally) and shortness of breath (Occasionally)  Endocrine: Negative  Genitourinary: Negative for difficulty urinating, dysuria and urgency  Musculoskeletal: Positive for arthralgias (Multiple, RA) and joint swelling (Multiple, RA )  Joint changes secondary to RA   Skin: Positive for wound (Right 4th finger pad )  Negative for rash  Allergic/Immunologic: Negative  Hematological: Does not bruise/bleed easily  Psychiatric/Behavioral: Positive for dysphoric mood  The patient is not nervous/anxious            Objective:  /72   Pulse 68   Temp 99 7 °F (37 6 °C)   Resp 18   Pain Score:   6     Physical Exam  Vitals and nursing note reviewed  Constitutional:       Appearance: Normal appearance  He is well-developed and normal weight  HENT:      Head: Normocephalic and atraumatic  Cardiovascular:      Rate and Rhythm: Normal rate  Pulmonary:      Effort: Pulmonary effort is normal    Musculoskeletal:        Hands:       Comments: Clean, granulation tissue  Overall smaller  No signs of infection  Significant rheumatoid arthritis changes of both hands  Skin:     General: Skin is warm and dry  Findings: Wound present  Neurological:      Mental Status: He is alert and oriented to person, place, and time  Psychiatric:         Attention and Perception: Attention normal          Mood and Affect: Mood and affect normal          Behavior: Behavior is cooperative  Cognition and Memory: Cognition normal                                 Wound Instructions:  Orders Placed This Encounter   Procedures    Wound cleansing and dressings     Wound cleansing and dressings       R 4th finger:  Wash your hands with soap and water  Remove old dressing, discard into plastic bag and place in trash  Cleanse the wound with prophase solution prior to applying a clean dressing  Do not use tissue or cotton balls  Do not scrub the wound  Pat dry using gauze  Shower yes; cover wound dressing and do not get wound or dressing wet   Apply endoform AM to moist wound bed  Use a drop of saline  Cover with dermagran gauze and dry gauze  Secure with geremias and coban  Make sure a small amount of coban is touching skin on finger to prevent the bandage from turning  Change dressing 2 x per week  Follow up with [facility] for wound care as per pt   Request   Dr Ese Knox agreeable      Today's wound treatment note:  Cleansed with prophase and redressed as above     Standing Status:   Future     Standing Expiration Date:   3/31/2023       Deepa Danielle MD, CHT, CWS       Portions of the record may have been created with voice recognition software  Occasional wrong word or "sound alike" substitutions may have occurred due to the inherent limitations of voice recognition software  Read the chart carefully and recognize, using context, where substitutions have occurred

## 2022-04-06 ENCOUNTER — OFFICE VISIT (OUTPATIENT)
Dept: FAMILY MEDICINE CLINIC | Facility: CLINIC | Age: 67
End: 2022-04-06
Payer: COMMERCIAL

## 2022-04-06 VITALS
SYSTOLIC BLOOD PRESSURE: 134 MMHG | HEART RATE: 86 BPM | WEIGHT: 170 LBS | OXYGEN SATURATION: 96 % | HEIGHT: 69 IN | TEMPERATURE: 96.8 F | DIASTOLIC BLOOD PRESSURE: 84 MMHG | BODY MASS INDEX: 25.18 KG/M2

## 2022-04-06 DIAGNOSIS — M51.36 DDD (DEGENERATIVE DISC DISEASE), LUMBAR: ICD-10-CM

## 2022-04-06 DIAGNOSIS — G89.29 TOE PAIN, CHRONIC, RIGHT: ICD-10-CM

## 2022-04-06 DIAGNOSIS — G47.00 INSOMNIA, UNSPECIFIED TYPE: ICD-10-CM

## 2022-04-06 DIAGNOSIS — I83.892 VARICOSE VEINS OF LEFT LOWER EXTREMITY WITH COMPLICATIONS: ICD-10-CM

## 2022-04-06 DIAGNOSIS — I48.0 PAF (PAROXYSMAL ATRIAL FIBRILLATION) (HCC): ICD-10-CM

## 2022-04-06 DIAGNOSIS — E78.5 HYPERLIPIDEMIA, UNSPECIFIED HYPERLIPIDEMIA TYPE: ICD-10-CM

## 2022-04-06 DIAGNOSIS — Z01.818 PREOP EXAMINATION: Primary | ICD-10-CM

## 2022-04-06 DIAGNOSIS — M79.674 TOE PAIN, CHRONIC, RIGHT: ICD-10-CM

## 2022-04-06 DIAGNOSIS — D84.821 IMMUNOSUPPRESSION DUE TO CHRONIC STEROID USE (HCC): ICD-10-CM

## 2022-04-06 DIAGNOSIS — Z79.52 IMMUNOSUPPRESSION DUE TO CHRONIC STEROID USE (HCC): ICD-10-CM

## 2022-04-06 DIAGNOSIS — M06.9 RHEUMATOID ARTHRITIS, INVOLVING UNSPECIFIED SITE, UNSPECIFIED WHETHER RHEUMATOID FACTOR PRESENT (HCC): ICD-10-CM

## 2022-04-06 DIAGNOSIS — S61.204A OPEN WOUND OF RIGHT RING FINGER: ICD-10-CM

## 2022-04-06 DIAGNOSIS — T38.0X5A IMMUNOSUPPRESSION DUE TO CHRONIC STEROID USE (HCC): ICD-10-CM

## 2022-04-06 DIAGNOSIS — K21.9 GASTROESOPHAGEAL REFLUX DISEASE, UNSPECIFIED WHETHER ESOPHAGITIS PRESENT: ICD-10-CM

## 2022-04-06 PROCEDURE — 3288F FALL RISK ASSESSMENT DOCD: CPT | Performed by: FAMILY MEDICINE

## 2022-04-06 PROCEDURE — 1160F RVW MEDS BY RX/DR IN RCRD: CPT | Performed by: FAMILY MEDICINE

## 2022-04-06 PROCEDURE — 3008F BODY MASS INDEX DOCD: CPT | Performed by: FAMILY MEDICINE

## 2022-04-06 PROCEDURE — 3725F SCREEN DEPRESSION PERFORMED: CPT | Performed by: FAMILY MEDICINE

## 2022-04-06 PROCEDURE — 99214 OFFICE O/P EST MOD 30 MIN: CPT | Performed by: FAMILY MEDICINE

## 2022-04-06 PROCEDURE — 1036F TOBACCO NON-USER: CPT | Performed by: FAMILY MEDICINE

## 2022-04-06 PROCEDURE — 1101F PT FALLS ASSESS-DOCD LE1/YR: CPT | Performed by: FAMILY MEDICINE

## 2022-04-06 RX ORDER — METOPROLOL SUCCINATE 50 MG
50 TABLET, EXTENDED RELEASE 24 HR ORAL
COMMUNITY
Start: 2022-02-02

## 2022-04-06 RX ORDER — PRAMIPEXOLE DIHYDROCHLORIDE 0.25 MG/1
0.25 TABLET ORAL DAILY
COMMUNITY
Start: 2022-03-14

## 2022-04-06 RX ORDER — CHLORHEXIDINE GLUCONATE 0.12 MG/ML
RINSE ORAL
COMMUNITY

## 2022-04-06 RX ORDER — CELECOXIB 100 MG/1
CAPSULE ORAL
COMMUNITY

## 2022-04-06 NOTE — PATIENT INSTRUCTIONS
Here for preop and is medically cleared for surgery by Dr Chastity Chicas for right 2nd toe surgery for ulceration and pain on 4/12/22  He needs letter of medical necessity for Norflex 100 mg 3 times a day as other mm relaxants dont work

## 2022-04-06 NOTE — PROGRESS NOTES
BMI Counseling: Body mass index is 25 1 kg/m²  The BMI is above normal  Nutrition recommendations include reducing portion sizes, decreasing overall calorie intake, 3-5 servings of fruits/vegetables daily, reducing fast food intake and consuming healthier snacks

## 2022-04-06 NOTE — PROGRESS NOTES
Assessment/Plan:  Chief Complaint   Patient presents with    Pre-op Exam     pre op physical  procedure on 04/12 for toe amputation, dr tamara suarez out of [facility]  Patient Instructions   Here for preop and is medically cleared for surgery by Dr Delaney Garcia for right 2nd toe surgery for ulceration and pain on 4/12/22  He needs letter of medical necessity for Norflex 100 mg 3 times a day as other mm relaxants dont work  No problem-specific Assessment & Plan notes found for this encounter  Diagnoses and all orders for this visit:    Preop examination    Encounter for immunization    Gastroesophageal reflux disease, unspecified whether esophagitis present    DDD (degenerative disc disease), lumbar    Hyperlipidemia, unspecified hyperlipidemia type    Immunosuppression due to chronic steroid use ([facility])    Insomnia, unspecified type    PAF (paroxysmal atrial fibrillation) ([facility])    Rheumatoid arthritis, involving unspecified site, unspecified whether rheumatoid factor present ([facility]    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    Open wound of right ring finger    Toe pain, chronic, right    Other orders  -     celecoxib (CeleBREX) 100 mg capsule; Celebrex  -     Acetaminophen (Tylenol) 325 MG CAPS; Tylenol  -     metoprolol succinate (Toprol XL) 50 mg 24 hr tablet  -     pramipexole (MIRAPEX) 0 25 mg tablet; TAKE 1/2 TABLET BY MOUTH 3 HOURS BEFORE BED X 1 WEEK, THEN INCREASE TO 1 TABLET 3 HOURS BEFORE BED  -     chlorhexidine (PERIDEX) 0 12 % solution; chlorhexidine gluconate 0 12 % mouthwash   RINSE MOUTH WITH 1/2 (HALF) OUNCE 2 TIMES DAILY AS DIRECTED          Subjective:      Patient ID: Aristides Lynch is a 78 y o  male      Pre-op Exam (pre op physical  procedure on 04/12 for toe amputation, dr tamara suarez out of [facility] )      The following portions of the patient's history were reviewed and updated as appropriate: allergies, current medications, past family history, past medical history, past social history, past surgical history and problem list     Review of Systems   Constitutional: Negative  HENT: Negative  Eyes: Negative  Respiratory: Negative  Cardiovascular:        PAF   Gastrointestinal: Negative  Endocrine: Negative  Genitourinary: Negative  Musculoskeletal: Positive for arthralgias  Right 2nd toe pain and ulceration   Skin:        Rig  ht 4th finger wound   Allergic/Immunologic: Negative  Neurological: Negative  Hematological: Negative  Psychiatric/Behavioral: Negative  Objective:      /84 (BP Location: Left arm, Patient Position: Sitting, Cuff Size: Adult)   Pulse 86   Temp (!) 96 8 °F (36 °C) (Temporal)   Ht 5' 9" (1 753 m)   Wt 77 1 kg (170 lb)   SpO2 96%   BMI 25 10 kg/m²          Physical Exam  Constitutional:       Appearance: He is well-developed  HENT:      Head: Normocephalic and atraumatic  Eyes:      Conjunctiva/sclera: Conjunctivae normal       Pupils: Pupils are equal, round, and reactive to light  Cardiovascular:      Rate and Rhythm: Normal rate and regular rhythm  Heart sounds: Normal heart sounds  Pulmonary:      Effort: Pulmonary effort is normal       Breath sounds: Normal breath sounds  Abdominal:      General: Abdomen is flat  Bowel sounds are normal       Palpations: Abdomen is soft  Musculoskeletal:         General: Normal range of motion  Cervical back: Normal range of motion and neck supple  Comments: RA multiple sites - ankles, knees, si joint and back and neck   Skin:     General: Skin is warm and dry  Comments: Right 2nd toe pain and ulceration, right 4th finger wound   Neurological:      Mental Status: He is alert and oriented to person, place, and time  Deep Tendon Reflexes: Reflexes are normal and symmetric     Psychiatric:         Behavior: Behavior normal

## 2022-04-07 PROCEDURE — NC001 PR NO CHARGE: Performed by: FAMILY MEDICINE

## 2022-04-11 ENCOUNTER — ANESTHESIA EVENT (OUTPATIENT)
Dept: PERIOP | Facility: HOSPITAL | Age: 67
End: 2022-04-11
Payer: COMMERCIAL

## 2022-04-11 NOTE — PRE-PROCEDURE INSTRUCTIONS
Pre-Surgery Instructions:   Medication Instructions    Acetaminophen (Tylenol) 325 MG CAPS Instructed patient per Anesthesia Guidelines  Take morning of surgery with sip water if needed    ascorbic acid (VITAMIN C) 500 mg tablet stop [email or web address]    atorvastatin (LIPITOR) 40 mg tablet takes HS-do not take morning of surgery    B-COMPLEX-C PO stop [email or web address]    Boswellia Sally (BOSWELLIA PO) stop OPTIMIZERx    celecoxib (CeleBREX) 200 mg capsule stop [email or web address]    chlorhexidine (PERIDEX) 0 12 % solution use morning of surgery if needed    Cholecalciferol (VITAMIN D3) 3000 units TABS stop [email or web address]    Coenzyme Q10 (CO Q 10) 100 MG CAPS stop [email or web address]    cyanocobalamin (VITAMIN B-12) 100 mcg tablet stop [email or web address]    diphenhydrAMINE (BENADRYL) 25 mg tablet Instructed patient per Anesthesia Guidelines  Take morning of surgery with sip water     esomeprazole (NexIUM) 40 MG capsule take morning of surgery with sip water     gabapentin (NEURONTIN) 600 MG tablet takes HS-do not take morning of surgery    glucosamine-chondroitin 500-400 MG tablet stop [email or web address]    itraconazole (SPORANOX) 100 mg capsule stop [email or web address]    MAGNESIUM PO stop [email or web address]    metoprolol succinate (Toprol XL) 50 mg 24 hr tablet takes HS-do not take morning of surgery    milk thistle 175 MG tablet stop [email or web address]    Multiple Vitamin (multivitamin) tablet stop OPTIMIZERx    OIL OF OREGANO PO stop OPTIMIZERx    Omega-3 Fatty Acids (FISH OIL PO) stopped 4/9    orphenadrine (NORFLEX) 100 mg tablet take morning of surgery with sip water if needed    oxyCODONE (ROXICODONE) 10 MG TABS Instructed patient per Anesthesia Guidelines  Take morning of surgery with sip water if needed    pramipexole (MIRAPEX) 0 25 mg tablet Instructed patient per Anesthesia Guidelines  Take morning of surgery with sip water     predniSONE 5 mg tablet Instructed patient per Anesthesia Guidelines   Take morning of surgery with sip water     Tocilizumab (ACTEMRA IV) takes every 4 weeks on Wednesdays-will not take morning of surgery    TURMERIC CURCUMIN PO stop [email or web address]    Zinc Sulfate (ZINC 15 PO) stop [email or web address]    zolpidem (AMBIEN) 10 mg tablet do not take morning of surgery   Covid screening negative as per patient  Reviewed pre op medicine and showering instructions with patient via phone call, verbalizes understanding  Advised patient to stop taking non prescribed vitamins, herbal meds and ASA as of [email or web address]  Advised to stop taking NSAID's as of [email or web address] but may take Tylenol products if needed        Advised NPO after MN prior to surgery and surgical services will call (4/11) with scheduled time of hospital arrival

## 2022-04-12 ENCOUNTER — ANESTHESIA (OUTPATIENT)
Dept: PERIOP | Facility: HOSPITAL | Age: 67
End: 2022-04-12
Payer: COMMERCIAL

## 2022-04-12 ENCOUNTER — HOSPITAL ENCOUNTER (OUTPATIENT)
Facility: HOSPITAL | Age: 67
Setting detail: OUTPATIENT SURGERY
Discharge: HOME | End: 2022-04-12
Attending: PODIATRIST | Admitting: PODIATRIST
Payer: COMMERCIAL

## 2022-04-12 ENCOUNTER — APPOINTMENT (OUTPATIENT)
Dept: RADIOLOGY | Facility: HOSPITAL | Age: 67
End: 2022-04-12
Payer: COMMERCIAL

## 2022-04-12 VITALS
BODY MASS INDEX: 23.93 KG/M2 | OXYGEN SATURATION: 99 % | HEART RATE: 65 BPM | RESPIRATION RATE: 18 BRPM | SYSTOLIC BLOOD PRESSURE: 145 MMHG | WEIGHT: 161.6 LBS | HEIGHT: 69 IN | DIASTOLIC BLOOD PRESSURE: 75 MMHG | TEMPERATURE: 97 F

## 2022-04-12 DIAGNOSIS — Z98.890 POST-OPERATIVE STATE: ICD-10-CM

## 2022-04-12 DIAGNOSIS — L97.512 RIGHT FOOT ULCER, WITH FAT LAYER EXPOSED (HCC): ICD-10-CM

## 2022-04-12 DIAGNOSIS — M79.674 PAIN IN TOE OF RIGHT FOOT: ICD-10-CM

## 2022-04-12 DIAGNOSIS — G89.18 ACUTE POST-OPERATIVE PAIN: Primary | ICD-10-CM

## 2022-04-12 PROCEDURE — 88311 DECALCIFY TISSUE: CPT | Performed by: PATHOLOGY

## 2022-04-12 PROCEDURE — 73630 X-RAY EXAM OF FOOT: CPT

## 2022-04-12 PROCEDURE — 88305 TISSUE EXAM BY PATHOLOGIST: CPT | Performed by: PATHOLOGY

## 2022-04-12 RX ORDER — ONDANSETRON 2 MG/ML
4 INJECTION INTRAMUSCULAR; INTRAVENOUS ONCE AS NEEDED
Status: DISCONTINUED | OUTPATIENT
Start: 2022-04-12 | End: 2022-04-12 | Stop reason: HOSPADM

## 2022-04-12 RX ORDER — FENTANYL CITRATE 50 UG/ML
INJECTION, SOLUTION INTRAMUSCULAR; INTRAVENOUS AS NEEDED
Status: DISCONTINUED | OUTPATIENT
Start: 2022-04-12 | End: 2022-04-12

## 2022-04-12 RX ORDER — CLINDAMYCIN PHOSPHATE 900 MG/50ML
900 INJECTION INTRAVENOUS ONCE
Status: DISCONTINUED | OUTPATIENT
Start: 2022-04-12 | End: 2022-04-12 | Stop reason: HOSPADM

## 2022-04-12 RX ORDER — EPHEDRINE SULFATE 50 MG/ML
INJECTION INTRAVENOUS AS NEEDED
Status: DISCONTINUED | OUTPATIENT
Start: 2022-04-12 | End: 2022-04-12

## 2022-04-12 RX ORDER — ACETAMINOPHEN 325 MG/1
650 TABLET ORAL ONCE AS NEEDED
Status: DISCONTINUED | OUTPATIENT
Start: 2022-04-12 | End: 2022-04-12 | Stop reason: HOSPADM

## 2022-04-12 RX ORDER — SODIUM CHLORIDE 9 MG/ML
125 INJECTION, SOLUTION INTRAVENOUS CONTINUOUS
Status: DISCONTINUED | OUTPATIENT
Start: 2022-04-12 | End: 2022-04-12 | Stop reason: HOSPADM

## 2022-04-12 RX ORDER — ONDANSETRON 2 MG/ML
INJECTION INTRAMUSCULAR; INTRAVENOUS AS NEEDED
Status: DISCONTINUED | OUTPATIENT
Start: 2022-04-12 | End: 2022-04-12

## 2022-04-12 RX ORDER — CEFADROXIL 1000 MG/1
1 TABLET ORAL 2 TIMES DAILY
Qty: 20 TABLET | Refills: 0 | Status: SHIPPED | OUTPATIENT
Start: 2022-04-12 | End: 2022-04-22

## 2022-04-12 RX ORDER — FENTANYL CITRATE/PF 50 MCG/ML
25 SYRINGE (ML) INJECTION
Status: DISCONTINUED | OUTPATIENT
Start: 2022-04-12 | End: 2022-04-12 | Stop reason: HOSPADM

## 2022-04-12 RX ORDER — OXYCODONE HYDROCHLORIDE AND ACETAMINOPHEN 5; 325 MG/1; MG/1
1 TABLET ORAL ONCE AS NEEDED
Status: COMPLETED | OUTPATIENT
Start: 2022-04-12 | End: 2022-04-12

## 2022-04-12 RX ORDER — CLINDAMYCIN PHOSPHATE 150 MG/ML
900 INJECTION, SOLUTION INTRAVENOUS ONCE
Status: DISCONTINUED | OUTPATIENT
Start: 2022-04-12 | End: 2022-04-12 | Stop reason: SDUPTHER

## 2022-04-12 RX ORDER — MIDAZOLAM HYDROCHLORIDE 2 MG/2ML
INJECTION, SOLUTION INTRAMUSCULAR; INTRAVENOUS AS NEEDED
Status: DISCONTINUED | OUTPATIENT
Start: 2022-04-12 | End: 2022-04-12

## 2022-04-12 RX ORDER — LIDOCAINE HYDROCHLORIDE 10 MG/ML
INJECTION, SOLUTION EPIDURAL; INFILTRATION; INTRACAUDAL; PERINEURAL AS NEEDED
Status: DISCONTINUED | OUTPATIENT
Start: 2022-04-12 | End: 2022-04-12 | Stop reason: HOSPADM

## 2022-04-12 RX ORDER — DEXAMETHASONE SODIUM PHOSPHATE 10 MG/ML
INJECTION, SOLUTION INTRAMUSCULAR; INTRAVENOUS AS NEEDED
Status: DISCONTINUED | OUTPATIENT
Start: 2022-04-12 | End: 2022-04-12

## 2022-04-12 RX ORDER — PROPOFOL 10 MG/ML
INJECTION, EMULSION INTRAVENOUS AS NEEDED
Status: DISCONTINUED | OUTPATIENT
Start: 2022-04-12 | End: 2022-04-12

## 2022-04-12 RX ORDER — LIDOCAINE HYDROCHLORIDE 20 MG/ML
INJECTION, SOLUTION EPIDURAL; INFILTRATION; INTRACAUDAL; PERINEURAL AS NEEDED
Status: DISCONTINUED | OUTPATIENT
Start: 2022-04-12 | End: 2022-04-12

## 2022-04-12 RX ADMIN — MIDAZOLAM 2 MG: 1 INJECTION INTRAMUSCULAR; INTRAVENOUS at 10:45

## 2022-04-12 RX ADMIN — EPHEDRINE SULFATE 15 MG: 50 INJECTION, SOLUTION INTRAVENOUS at 11:00

## 2022-04-12 RX ADMIN — EPHEDRINE SULFATE 10 MG: 50 INJECTION, SOLUTION INTRAVENOUS at 10:58

## 2022-04-12 RX ADMIN — OXYCODONE HYDROCHLORIDE AND ACETAMINOPHEN 1 TABLET: 5; 325 TABLET ORAL at 12:20

## 2022-04-12 RX ADMIN — EPHEDRINE SULFATE 10 MG: 50 INJECTION, SOLUTION INTRAVENOUS at 10:55

## 2022-04-12 RX ADMIN — ONDANSETRON 4 MG: 2 INJECTION INTRAMUSCULAR; INTRAVENOUS at 11:07

## 2022-04-12 RX ADMIN — SODIUM CHLORIDE 125 ML/HR: 0.9 INJECTION, SOLUTION INTRAVENOUS at 11:29

## 2022-04-12 RX ADMIN — EPHEDRINE SULFATE 5 MG: 50 INJECTION, SOLUTION INTRAVENOUS at 11:07

## 2022-04-12 RX ADMIN — FENTANYL CITRATE 25 MCG: 50 INJECTION INTRAMUSCULAR; INTRAVENOUS at 11:43

## 2022-04-12 RX ADMIN — PROPOFOL 200 MG: 10 INJECTION, EMULSION INTRAVENOUS at 10:50

## 2022-04-12 RX ADMIN — EPHEDRINE SULFATE 15 MG: 50 INJECTION, SOLUTION INTRAVENOUS at 11:03

## 2022-04-12 RX ADMIN — LIDOCAINE HYDROCHLORIDE 80 MG: 20 INJECTION, SOLUTION EPIDURAL; INFILTRATION; INTRACAUDAL; PERINEURAL at 10:50

## 2022-04-12 RX ADMIN — CLINDAMYCIN PHOSPHATE 900 MG: 150 INJECTION, SOLUTION INTRAMUSCULAR; INTRAVENOUS at 10:45

## 2022-04-12 RX ADMIN — DEXAMETHASONE SODIUM PHOSPHATE 5 MG: 10 INJECTION INTRAMUSCULAR; INTRAVENOUS at 11:00

## 2022-04-12 RX ADMIN — PROPOFOL 50 MG: 10 INJECTION, EMULSION INTRAVENOUS at 10:51

## 2022-04-12 RX ADMIN — SODIUM CHLORIDE 125 ML/HR: 0.9 INJECTION, SOLUTION INTRAVENOUS at 10:08

## 2022-04-12 RX ADMIN — FENTANYL CITRATE 50 MCG: 50 INJECTION INTRAMUSCULAR; INTRAVENOUS at 10:50

## 2022-04-12 NOTE — DISCHARGE INSTRUCTIONS
Post-Operative Instructions    1  Take your prescribed medication as directed  2  Upon arrival at home, lie down and elevate your surgical foot on 2 pillows  3  Remain quiet, off your feet as much as possible, for the first 24-48 hours  This is when your feet first swell and may become painful  After 48 hours you may begin limited walking following these restrictions:   Weightbear as tolerated to surgical foot with surgical shoe  Limit walking as much as possibly until seen by Dr Beverley Jin in the office  Weight to heel as much as possible  Crutches for assistance as needed  4  Drink large quantities of water  Consume no alcohol  Continue a well-balanced diet  5  Report any unusual discomfort or fever to this office  6  A limited amount of discomfort and swelling is to be expected  In some cases the skin may take on a bruised appearance  The surgical solution that was applied to your foot prior to the operation is dark in color and the operation site may appear to be oozing when it actually is not  7  A slight amount of blood is to be expected, and is no cause for alarm  Do not remove the dressings  If there is active bleeding and if the bleeding persists, add additional gauze to the bandage, apply direct pressure, elevate your feet and call this office  8  Do not get the dressings wet  As regular bathing may be inconvenient, sponge baths are recommended  9  When anesthesia wears off and if any discomfort should be present, apply an ice pack directly over the operated area for 15 minute intervals for several hours or until the pain leaves  (USE IN EXCESS OF 15 MINUTES COULD CAUSE FROSTBITE)  Do not use hot water bags or electric pads  A convenient icepack can be made by placing ice cubes in a plastic bag and covering this with a towel  10  If necessary, take a mild laxative before retiring    11  Wear your special open shoes anytime you put weight on your foot, even if it is just to walk to the bathroom and back  It will probably be 2 or 3 weeks before you will be permitted to try regular shoes  12  Having performed the operation, we are interested in a prompt recovery  Please cooperate by following the above instructions  13  Please call to confirm your post-op appointment or call with any other questions

## 2022-04-12 NOTE — ANESTHESIA PREPROCEDURE EVALUATION
Procedure:  2nd toe amputation at [facility] (Right Foot)    Relevant Problems   ANESTHESIA (within normal limits)      CARDIO   (+) Hyperlipidemia   (+) PAF (paroxysmal atrial fibrillation) (HCC)      GI/HEPATIC   (+) GERD (gastroesophageal reflux disease)      MUSCULOSKELETAL   (+) DDD (degenerative disc disease), lumbar   (+) Rheumatoid arthritis (HCC)      NEURO/PSYCH   (+) Chronic pain disorder      PULMONARY   (+) Sleep apnea      Other   (+) CPAP (continuous positive airway pressure) dependence        Physical Exam    Airway  Comment: bassett  Mallampati score: II  TM Distance: <3 FB  Neck ROM: full     Dental   No notable dental hx     Cardiovascular  Cardiovascular exam normal    Pulmonary  Pulmonary exam normal     Other Findings        Anesthesia Plan  ASA Score- 3     Anesthesia Type- general with ASA Monitors  Additional Monitors:   Airway Plan: LMA  Comment: Chronic opioid use  Plan Factors-    Chart reviewed  Existing labs reviewed  Patient summary reviewed  Induction- intravenous  Postoperative Plan-     Informed Consent- Anesthetic plan and risks discussed with patient

## 2022-04-12 NOTE — OP NOTE
OPERATIVE REPORT - Podiatry  PATIENT NAME: Loyd Miller    :  [id number]  MRN: [id number]  Pt Location: [facility] OR ROOM 02    SURGERY DATE: 2022    Surgeon(s) and Role: * Adam Avery DPM - Primary     * [facility] - Assisting    Pre-op Diagnosis:  Right foot ulcer, with fat layer exposed ([address] ) [L97 512]  Pain in toe of right foot [M79 674]    Post-Op Diagnosis Codes:     * Right foot ulcer, with fat layer exposed ([address] ) [L97 512]     * Pain in toe of right foot [M79 674]    Procedure(s) (LRB):  2nd toe amputation at MTPJ (Right)    Specimen(s):  ID Type Source Tests Collected by Time Destination   1 : left second toe Tissue Toe, Left TISSUE EXAM Adam Avery DPM 2022 1106        Estimated Blood Loss:   Minimal    Drains:  * No LDAs found *    Anesthesia Type:   IV Sedation with Anesthesia with 6 ml of 1% Lidocaine     Hemostasis:  Surgical dissection  Materials:  * No implants in log *  Vicryl, nylon    Operative Findings:  Per note bellow    Complications:   None    Procedure and Technique:     Under mild sedation, the patient was brought into the operating room and placed on the operating room table in the supine position  A time out was performed to confirm the correct patient, procedure and site with all parties in agreement  Following IV sedation, a local block was performed consisting of 6 ml of 1% Lidocaine  The foot was then scrubbed, prepped and draped in the usual aseptic manner  The foot was placed on the operating room table  Attention was directed to the right, 2nd toe  Callus/irritation noted to medial aspect of 2nd hassan  A racket type of incision was made  Utilizing a sterile 15 blade, this incision was carried deep straight to bone  Soft tissue structures were then reflected off the proximal phalanx  Utilizing a sterile 15 blade, all capsular structures were severed and the toe was then disarticulated at the level of the MTPJ and then placed on the back table   It was noted that all tissue margins were bleeding and viable  No deep sinus tracts or areas of purulence were visualized  Bone was noted to be hard and viable  The surgical incision was irrigated with copious amounts of normal sterile saline  Subcutaneous closure was obtained utilizing 3-0 Vicryl in an interrupted suture technique  Skin edges were reapproximated and closure was obtained utilizing interrupted retention sutures utilizing 3-0  Prolene  The foot was then cleansed and dried  The incision site was dressed with Xeroform, 4x4 gauze  This was then covered with a Kerlix and an ACE wrap  The patient tolerated the procedure and anesthesia well and was transported to the PACU with vital signs stable  Dr Chica Pina was present during the entire procedure and participated in all key aspects  SIGNATURE: Adam Rae  DATE: April 12, 2022  TIME: 11:29 AM      Portions of the record may have been created with voice recognition software  Occasional wrong word or "sound a like" substitutions may have occurred due to the inherent limitations of voice recognition software  Read the chart carefully and recognize, using context, where substitutions have occurred

## 2022-04-12 NOTE — DISCHARGE SUMMARY
Discharge Summary Outpatient Procedure Podiatry -   Zenon Shi 78 y o  male MRN: [id number]  Unit/Bed#: OR POOL Encounter: [id number]    Admission Date: 4/12/2022     Admitting Diagnosis: Right foot ulcer, with fat layer exposed ([facility] [address] ) [L97 512]  Pain in toe of right foot [M19 964]    Discharge Diagnosis: same    Procedures Performed: 2nd toe amputation at MTPJ: 48323 (CPT®)    Complications: none    Condition at Discharge: stable    Discharge instructions/Information to patient and family:   See after visit summary for information provided to patient and family  Provisions for Follow-Up Care/Important appointments:  See after visit summary for information related to follow-up care and any pertinent home health orders  Discharge Medications:  See after visit summary for reconciled discharge medications provided to patient and family

## 2022-04-12 NOTE — INTERVAL H&P NOTE
H&P reviewed  After examining the patient I find no changes in the patients condition since the H&P had been written      Vitals:    04/12/22 0950   BP: 119/81   Pulse: 68   Resp: 16   Temp: 97 9 °F (36 6 °C)   SpO2: 97%

## 2022-04-12 NOTE — ANESTHESIA POSTPROCEDURE EVALUATION
Post-Op Assessment Note    CV Status:  Stable  Pain Score: 1    Pain management: adequate     Mental Status:  Alert and awake   Hydration Status:  Euvolemic   PONV Controlled:  Controlled   Airway Patency:  Patent      Post Op Vitals Reviewed: Yes      Staff: Anesthesiologist         No complications documented      BP 94/50 (04/12/22 1120)    Temp (!) 97 4 °F (36 3 °C) (04/12/22 1120)    Pulse 66 (04/12/22 1120)   Resp 16 (04/12/22 1120)    SpO2 95 % (04/12/22 1120)

## 2022-05-18 DIAGNOSIS — G47.00 INSOMNIA, UNSPECIFIED TYPE: ICD-10-CM

## 2022-05-18 RX ORDER — ZOLPIDEM TARTRATE 10 MG/1
TABLET ORAL
Qty: 30 TABLET | Refills: 0 | Status: SHIPPED | OUTPATIENT
Start: 2022-05-18

## 2022-06-25 DIAGNOSIS — M62.838 MUSCLE SPASM: ICD-10-CM

## 2022-06-27 RX ORDER — ORPHENADRINE CITRATE 100 MG/1
TABLET, EXTENDED RELEASE ORAL
Qty: 270 TABLET | Refills: 3 | Status: SHIPPED | OUTPATIENT
Start: 2022-06-27

## 2022-08-24 DIAGNOSIS — G47.00 INSOMNIA, UNSPECIFIED TYPE: ICD-10-CM

## 2022-08-24 DIAGNOSIS — M62.838 MUSCLE SPASM: ICD-10-CM

## 2022-08-24 DIAGNOSIS — K21.9 GASTROESOPHAGEAL REFLUX DISEASE WITHOUT ESOPHAGITIS: ICD-10-CM

## 2022-08-24 RX ORDER — ZOLPIDEM TARTRATE 10 MG/1
10 TABLET ORAL
Qty: 30 TABLET | Refills: 0 | Status: SHIPPED | OUTPATIENT
Start: 2022-08-24

## 2022-08-24 RX ORDER — ORPHENADRINE CITRATE 100 MG/1
100 TABLET, EXTENDED RELEASE ORAL 3 TIMES DAILY PRN
Qty: 270 TABLET | Refills: 3 | Status: SHIPPED | OUTPATIENT
Start: 2022-08-24 | End: 2022-08-30 | Stop reason: SDUPTHER

## 2022-08-24 RX ORDER — ESOMEPRAZOLE MAGNESIUM 40 MG/1
40 CAPSULE, DELAYED RELEASE ORAL DAILY PRN
Qty: 90 CAPSULE | Refills: 3 | Status: SHIPPED | OUTPATIENT
Start: 2022-08-24 | End: 2022-10-21

## 2022-08-30 DIAGNOSIS — M62.838 MUSCLE SPASM: ICD-10-CM

## 2022-08-30 RX ORDER — ORPHENADRINE CITRATE 100 MG/1
100 TABLET, EXTENDED RELEASE ORAL 3 TIMES DAILY PRN
Qty: 270 TABLET | Refills: 3 | Status: SHIPPED | OUTPATIENT
Start: 2022-08-30 | End: 2022-09-30 | Stop reason: SDUPTHER

## 2022-08-30 NOTE — TELEPHONE ENCOUNTER
Pt called the office requesting a refill of orphenadrine 100 mg tablet  Pt needs a 90 day with [facility]  Pt would need this to be done today

## 2022-09-08 ENCOUNTER — RA CDI HCC (OUTPATIENT)
Dept: OTHER | Facility: HOSPITAL | Age: 67
End: 2022-09-08

## 2022-09-08 NOTE — PROGRESS NOTES
[facility]  coding opportunities       Chart reviewed, no opportunity found: CHART REVIEWED, NO OPPORTUNITY FOUND        Patients Insurance        Commercial Insurance: [address] [place] [address]

## 2022-09-08 NOTE — MISCELLANEOUS
9/8/2022 4:50 PM -  Alexandra Ferreira's chart and case were reviewed by Gualberto Cardenas PA-C  Mode of review included electronic chart check  Per review, patient's O2 requirements have escalated to 5L today  Pulmonology and internal medicine teams continue to optimize  Palliative care will return on 9/9  For urgent issues or any questions/concerns, please notify on-call provider via [employer]  You may also call our answering service 24/7 at   Gualberto Cardenas PA-C  Palliative and Supportive Care  Clinic/Answering Service: [phone]  You can find me on [employer] Message   Recorded as Task   Date: 06/26/2017 12:03 PM, Created By: Corey Matthews   Task Name: Miscellaneous   Assigned To: [facility] surgery sched,Team   Regarding Patient: Carmen Lyons, Status: Active   Comment:    Marco Quinones - 26 Jun 2017 12:03 PM     TASK CREATED  Pt wife called, pt pain is returning, last had a caudal LISBETH on 4/14/17  Pt pain varies throughout the day, ranging from 6/10 at best to a 10/10  Pt is going on vacation and is hoping to schedule a repeat caudal LISBETH on either 7/7 or 7/14  Pain is the same as prior to last injection  Is this pt OK to come for another injection? [address] [employer] [facility] [address] - 26 Jun 2017 4:22 PM     TASK REPLIED TO: Previously Assigned To Marco Evans - 28 Jun 2017 4:31 PM     TASK IN PROGRESS   Marco Quinones - 28 Jun 2017 4:32 PM     TASK REASSIGNED: Previously Assigned To Corey Matthews  LMOM for pt to cb to [facility] 29 Jun 2017 2:16 PM     TASK EDITED  I returned message from wife, Nathanael Dominique to schedule:     caudal LISBETH on 7/7/17, [place] office, arrive @5250    reviewed instructions: , NPO 1 hour prior, loose-fitting clothing, if ill/start abx/fever/infx to call and reschedule  denies any NSAIDs or blood thinning medications for any holds     ==============================    Patient is scheduled for chiropractor later that afternoon - should he cx or keep? What do you advise? [address] - 29 Jun 2017 3:06 PM     TASK REPLIED TO: Previously Assigned To [address]  I would wait about 3 days after injection to return to the chiropractor  Marco Quinones - 30 Jun 2017 8:31 AM     TASK REASSIGNED: Previously Assigned To [facility] surgery sched,Team   Marco Quinones - 03 Jul 2017 8:31 AM     TASK REPLIED TO: Previously Assigned To Marco Quinones  CAlled pt, LMOM advising him of the below information  Active Problems    1  Abnormal neurological exam (781 99) (R29 90)   2   Acute hip pain (719 45) (M25 559)   3  Acute upper respiratory infection (465 9) (J06 9)   4  Analgesic use (V58 69) (Z79 899)   5  Bruise (924 9) (T14 8)   6  Chronic pain syndrome (338 4) (G89 4)   7  Chronic steroid use (V58 65)   8  DDD (degenerative disc disease), lumbosacral (722 52) (M51 37)   9  Depression screen (V79 0) (Z13 89)   10  Encounter for screening for malignant neoplasm of colon (V76 51) (Z12 11)   11  Esophageal reflux (530 81) (K21 9)   12  Fall with injury (959 9,E888 9) (W19 XXXA)   15  Hip pain, right (719 45) (M25 551)   14  Left ankle swelling (719 07) (M25 472)   15  Left leg swelling (729 81) (M79 89)   16  Leg hematoma (924 5) (S80 10XA)   17  Levoscoliosis (737 39) (M41 80)   18  Low back pain (724 2) (M54 5)   19  Lumbar post-laminectomy syndrome (722 83) (M96 1)   20  Lumbar radiculopathy (724 4) (M54 16)   21  Multiple joint pain (719 49) (M25 50)   22  Myalgia (729 1) (M79 1)   23  Neoplasm of skin (239 2) (D49 2)   24  Opioid dependence (304 00) (F11 20)   25  Other muscle spasm (728 85) (M62 838)   26  Pain in both lower extremities (729 5) (M79 604,M79 605)   27  Reactive airway disease (493 90) (J45 909)   28  Restless legs syndrome (333 94) (G25 81)   29  Rheumatoid arthritis (714 0) (M06 9)   30  Rib pain on right side (786 50) (R07 81)   31  Skin rash (782 1) (R21)   32  Sleep apnea (780 57) (G47 30)   33  Spondylolisthesis, grade 1 (738 4) (M43 10)   34  Visual disturbances (368 9) (H53 9)    Current Meds   1  B-Complex TABS; Therapy: (Recorded:07Nov2012) to Recorded   2  Celecoxib 200 MG Oral Capsule (CeleBREX); TAKE 1 CAPSULE DAILY AS NEEDED;   Last Rx:10Jun2015 Ordered   3  Collagen Hydrolysate Powder; Therapy: (Recorded:09Feb2016) to Recorded   4  Enbrel 50 MG/ML SOLN; INJECT 50MG SUBCUTANEOUSLY WEEKLY Recorded   5  Endocet 7 5-325 MG Oral Tablet; TAKE 1 TABLET EVERY 6 HOURS AS NEEDED FOR   PAIN;   Therapy: 20Aug2015 to (Evaluate:16Aug2017); Last Rx:30May2017 Ordered   6   Esomeprazole Magnesium 40 MG Oral Capsule Delayed Release (NexIUM); TAKE 1   CAPSULE DAILY AS NEEDED FOR GASTROESOPHAGEAL REFLUX DISEASE; Therapy: 27HKZ7237 to (Last Rx:57Uvr3940)  Requested for: 15Oiv7250 Ordered   7  Gabapentin 600 MG Oral Tablet; TAKE 1 5 TABLET 3 times daily; Therapy: 35CNU0850 to (Evaluate:93Qai6879)  Requested for: 46GUI3923; Last   Rx:86Gvq2921 Ordered   8  Glucosamine TABS; Therapy: (044 803 99 21) to Recorded   9  Orphenadrine Citrate  MG Oral Tablet Extended Release 12 Hour; TAKE 1   TABLET THREE TIMES A DAY AS NEEDED FOR MUSCLE SPASM; Therapy: 84OOA6712 to (Last Rx:04Jun2017)  Requested for: 29JNR9706 Ordered   10  Sleep Oral Capsule; Therapy: 74PTI3294 to Recorded   11  Vitamin B12 TABS; Therapy: (Recorded:07Nov2012) to Recorded    Allergies    1  Morphine Derivatives   2  Penicillins   3   Sulfa Drugs    Signatures   Electronically signed by : Emeka Nichols, ; Jul  3 2017  8:31AM EST                       (Author)

## 2022-09-30 DIAGNOSIS — M62.838 MUSCLE SPASM: ICD-10-CM

## 2022-10-03 RX ORDER — ORPHENADRINE CITRATE 100 MG/1
100 TABLET, EXTENDED RELEASE ORAL 3 TIMES DAILY PRN
Qty: 270 TABLET | Refills: 3 | Status: SHIPPED | OUTPATIENT
Start: 2022-10-03

## 2022-10-04 ENCOUNTER — OFFICE VISIT (OUTPATIENT)
Dept: FAMILY MEDICINE CLINIC | Facility: CLINIC | Age: 67
End: 2022-10-04
Payer: MEDICARE

## 2022-10-04 VITALS
SYSTOLIC BLOOD PRESSURE: 134 MMHG | HEART RATE: 67 BPM | OXYGEN SATURATION: 92 % | DIASTOLIC BLOOD PRESSURE: 82 MMHG | RESPIRATION RATE: 16 BRPM | TEMPERATURE: 96.8 F

## 2022-10-04 DIAGNOSIS — Z12.5 SCREENING FOR PROSTATE CANCER: ICD-10-CM

## 2022-10-04 DIAGNOSIS — M51.36 DDD (DEGENERATIVE DISC DISEASE), LUMBAR: ICD-10-CM

## 2022-10-04 DIAGNOSIS — G47.30 SLEEP APNEA, UNSPECIFIED TYPE: ICD-10-CM

## 2022-10-04 DIAGNOSIS — Z00.00 HEALTH CARE MAINTENANCE: Primary | ICD-10-CM

## 2022-10-04 DIAGNOSIS — Z99.89 CPAP (CONTINUOUS POSITIVE AIRWAY PRESSURE) DEPENDENCE: ICD-10-CM

## 2022-10-04 DIAGNOSIS — I48.0 PAF (PAROXYSMAL ATRIAL FIBRILLATION) (HCC): ICD-10-CM

## 2022-10-04 DIAGNOSIS — Z79.52 IMMUNOSUPPRESSION DUE TO CHRONIC STEROID USE (HCC): ICD-10-CM

## 2022-10-04 DIAGNOSIS — K21.9 GASTROESOPHAGEAL REFLUX DISEASE, UNSPECIFIED WHETHER ESOPHAGITIS PRESENT: ICD-10-CM

## 2022-10-04 DIAGNOSIS — Z23 NEED FOR PNEUMOCOCCAL VACCINE: ICD-10-CM

## 2022-10-04 DIAGNOSIS — E78.5 HYPERLIPIDEMIA, UNSPECIFIED HYPERLIPIDEMIA TYPE: ICD-10-CM

## 2022-10-04 DIAGNOSIS — D84.821 IMMUNOSUPPRESSION DUE TO CHRONIC STEROID USE (HCC): ICD-10-CM

## 2022-10-04 DIAGNOSIS — Z00.00 MEDICARE ANNUAL WELLNESS VISIT, SUBSEQUENT: ICD-10-CM

## 2022-10-04 DIAGNOSIS — G47.00 INSOMNIA, UNSPECIFIED TYPE: ICD-10-CM

## 2022-10-04 DIAGNOSIS — M06.9 RHEUMATOID ARTHRITIS, INVOLVING UNSPECIFIED SITE, UNSPECIFIED WHETHER RHEUMATOID FACTOR PRESENT (HCC): ICD-10-CM

## 2022-10-04 DIAGNOSIS — Z23 ENCOUNTER FOR IMMUNIZATION: ICD-10-CM

## 2022-10-04 DIAGNOSIS — G89.4 CHRONIC PAIN DISORDER: ICD-10-CM

## 2022-10-04 DIAGNOSIS — T38.0X5A IMMUNOSUPPRESSION DUE TO CHRONIC STEROID USE (HCC): ICD-10-CM

## 2022-10-04 PROBLEM — F11.29 OPIOID DEPENDENCE WITH OPIOID-INDUCED DISORDER (HCC): Status: ACTIVE | Noted: 2022-10-04

## 2022-10-04 PROCEDURE — G0402 INITIAL PREVENTIVE EXAM: HCPCS | Performed by: FAMILY MEDICINE

## 2022-10-04 PROCEDURE — 90677 PCV20 VACCINE IM: CPT

## 2022-10-04 PROCEDURE — 99214 OFFICE O/P EST MOD 30 MIN: CPT | Performed by: FAMILY MEDICINE

## 2022-10-04 PROCEDURE — G0009 ADMIN PNEUMOCOCCAL VACCINE: HCPCS

## 2022-10-04 RX ORDER — SILDENAFIL 100 MG/1
100 TABLET, FILM COATED ORAL
COMMUNITY
Start: 2022-09-02 | End: 2023-09-02

## 2022-10-04 RX ORDER — MINOCYCLINE HYDROCHLORIDE 50 MG/1
50 TABLET ORAL 2 TIMES DAILY
COMMUNITY
Start: 2022-08-11

## 2022-10-04 RX ORDER — ASPIRIN 81 MG/1
81 TABLET ORAL EVERY OTHER DAY
COMMUNITY

## 2022-10-04 NOTE — PROGRESS NOTES
Assessment and Plan:     Problem List Items Addressed This Visit        Digestive    GERD (gastroesophageal reflux disease)       Respiratory    Sleep apnea       Cardiovascular and Mediastinum    PAF (paroxysmal atrial fibrillation) (HCC)    Relevant Medications    sildenafil (VIAGRA) 100 mg tablet       Musculoskeletal and Integument    DDD (degenerative disc disease), lumbar    Rheumatoid arthritis ([address] )       Other    Insomnia    Immunosuppression due to chronic steroid use (HCC)    Hyperlipidemia    Relevant Orders    Comprehensive metabolic panel    Lipid Panel with Direct LDL reflex    CPAP (continuous positive airway pressure) dependence    Chronic pain disorder      Other Visit Diagnoses     Health care maintenance    -  Primary    Medicare annual wellness visit, subsequent        Encounter for immunization        Relevant Orders    Pneumococcal Conjugate Vaccine 20-valent (PCV20)    Need for pneumococcal vaccine        Screening for prostate cancer        Relevant Orders    PSA, Total Screen           Preventive health issues were discussed with patient, and age appropriate screening tests were ordered as noted in patient's After Visit Summary  Personalized health advice and appropriate referrals for health education or preventive services given if needed, as noted in patient's After Visit Summary  History of Present Illness:     Patient presents for a Medicare Wellness Visit    Medicare Wellness Visit Eliu Lees exam , pt would like prescription for rash  )     Patient Care Team:  Hanna Cartwright DO as PCP - [facility] DO as PCP - [facility]  Sweta Lazaro MD     Review of Systems:     Review of Systems   Constitutional: Negative  HENT: Negative  Eyes: Negative  Respiratory: Negative  Cardiovascular: Negative  Gastrointestinal: Negative      Endocrine: Negative  Genitourinary: Negative  Musculoskeletal: Negative  Skin: Negative  Allergic/Immunologic: Negative  Neurological: Negative  Hematological: Negative  Psychiatric/Behavioral: Negative           Problem List:     Patient Active Problem List   Diagnosis    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    DDD (degenerative disc disease), lumbar    Rheumatoid arthritis ([place] [address] )    Insomnia    PAF (paroxysmal atrial fibrillation) ([facility] )    Open wound of right ring finger    Immunosuppression due to chronic steroid use ([facility])    Hyperlipidemia    GERD (gastroesophageal reflux disease)    CPAP (continuous positive airway pressure) dependence    Sleep apnea    Chronic pain disorder    Opioid dependence with opioid-induced disorder ([place] [address] )      Past Medical and Surgical History:     Past Medical History:   Diagnosis Date    Acute right lumbar radiculopathy     Allergic rhinitis     Anxiety     Arthritis     Chronic pain disorder     CPAP (continuous positive airway pressure) dependence     Bi-Pap machine    Depression     Dislocation     Right hip prone to dislocations with sudden movement    GERD (gastroesophageal reflux disease)     Hyperlipidemia     Insomnia     PAF (paroxysmal atrial fibrillation) ([facility])     Pain in both lower extremities     Pain in left shoulder     RA (rheumatoid arthritis) ([facility])     Restless leg     Rheumatoid arthritis ([place] [address] )     last assessed 07/29/16    Seasonal allergies     Sleep apnea     partial per pt     Past Surgical History:   Procedure Laterality Date    BACK SURGERY      lami L 1-2-3-4    COLONOSCOPY      ELBOW SURGERY Right     tendon repair    FINGER SURGERY Right 12/29/2021    R 4th finger    FOOT SURGERY Bilateral     INGUINAL HERNIA REPAIR      JOINT REPLACEMENT Bilateral     both hips twice    KNEE SURGERY      NERVE BLOCK Right     peripheral nerve block wrist radial    PALATOPHARYNGOPLASTY      CT AMPUTATION TOE,MT-P JT Right 4/12/2022    Procedure: 2nd toe amputation at MTPJ;  Surgeon: Juanis Muro DPM;  [facility];  Service: Podiatry   Morgan W/PRITI W/PAM Mondragon Jose Luis Right 1/25/2021    Procedure: Misti Vivasry;  Surgeon: Bob Gonzalez DPM;  [facility];  Service: Podiatry   [facility] TO 20 Left 12/14/2018    Procedure: LOWER EXTREMITY MICRO PHLEBECTOMIES;  Surgeon: Kvng Pedersen DO;  [facility];  Service: Vascular    IA REPAIR OF NELLY RIOS Right 1/25/2021    Procedure: 2ND MPJ RELEASE;  Surgeon: Bob Gonzalez DPM;  [facility];  Service: Podiatry    SHOULDER SURGERY Left     dislocation repair of muscle    SINUS SURGERY      TONSILLECTOMY AND ADENOIDECTOMY      UMBILICAL HERNIA REPAIR        Family History:     Family History   Problem Relation Age of Onset    Heart disease Mother     Heart failure Mother     Transient ischemic attack Father     Gout Brother     Stroke Family         CVA      Social History:     Social History     Socioeconomic History    Marital status: /Civil Union     Spouse name: None    Number of children: None    Years of education: None    Highest education level: None   Occupational History    None   Tobacco Use    Smoking status: Never Smoker    Smokeless tobacco: Never Used   Vaping Use    Vaping Use: Former   Substance and Sexual Activity    Alcohol use:  Yes     Alcohol/week: 2 0 standard drinks     Types: 2 Cans of beer per week     Comment: socially; 1-2 drinks every other week    Drug use: Yes     Types: Marijuana     Comment: CBD tincture HS    Sexual activity: Never   Other Topics Concern    None   Social History Narrative    Drinks coffee     Social Determinants of Health     Financial Resource Strain: Not on file   Food Insecurity: Not on file   Transportation Needs: Not on file   Physical Activity: Not on file   Stress: Not on file   Social Connections: Not on file   Intimate Partner Violence: Not on file   Housing Stability: Not on file      Medications and Allergies:     Current Outpatient Medications   Medication Sig Dispense Refill    Acetaminophen (Tylenol) 325 MG CAPS Take 650 mg by mouth every 6 (six) hours as needed        ascorbic acid (VITAMIN C) 500 mg tablet Take 500 mg by mouth 4 (four) times a day      aspirin (ECOTRIN LOW STRENGTH) 81 mg EC tablet Take 81 mg by mouth every other day      atorvastatin (LIPITOR) 40 mg tablet TAKE 1 TABLET BY MOUTH EVERY DAY AT NIGHT      B-COMPLEX-C PO Take 1 tablet by mouth daily       Boswellia Sally (BOSWELLIA PO) Take by mouth 400 mg 3 x's a day      celecoxib (CeleBREX) 100 mg capsule Celebrex      celecoxib (CeleBREX) 200 mg capsule Take 200 mg by mouth 2 (two) times a day      chlorhexidine (PERIDEX) 0 12 % solution chlorhexidine gluconate 0 12 % mouthwash   RINSE MOUTH WITH 1/2 (HALF) OUNCE 2 TIMES DAILY AS DIRECTED      Cholecalciferol (VITAMIN D3) 3000 units TABS Take 2 tablets by mouth daily      Coenzyme Q10 (CO Q 10) 100 MG CAPS Take 1 capsule by mouth daily       cyanocobalamin (VITAMIN B-12) 100 mcg tablet Take by mouth daily      diphenhydrAMINE (BENADRYL) 25 mg tablet Take 25 mg by mouth 3 (three) times a day with meals allergies      esomeprazole (NexIUM) 40 MG capsule Take 1 capsule (40 mg total) by mouth daily as needed (gerd) 90 capsule 3    gabapentin (NEURONTIN) 600 MG tablet Take 1,800 mg by mouth daily at bedtime        glucosamine-chondroitin 500-400 MG tablet Take 1 tablet by mouth 3 (three) times a day      itraconazole (SPORANOX) 100 mg capsule Take 200 mg by mouth 2 (two) times a day      MAGNESIUM PO Take 100 mg by mouth Takes 6 time daily spread throughout the day      metoprolol succinate (Toprol XL) 50 mg 24 hr tablet Take 50 mg by mouth daily at bedtime        milk thistle 175 MG tablet Take 175 mg by mouth daily      minocycline (DYNACIN) 50 MG tablet Take 50 mg by mouth 2 (two) times a day      Multiple Vitamin (multivitamin) tablet Take 1 tablet by mouth daily      OIL OF OREGANO PO Take 1 tablet by mouth daily       Omega-3 Fatty Acids (FISH OIL PO) Take 2 g by mouth 3 (three) times a day with meals       orphenadrine (NORFLEX) 100 mg tablet Take 1 tablet (100 mg total) by mouth 3 (three) times a day as needed for muscle spasms Medically necessary, and needs to state 3 times daily 270 tablet 3    oxyCODONE (ROXICODONE) 10 MG TABS Take 10 mg by mouth 4 (four) times a day   0    pramipexole (MIRAPEX) 0 25 mg tablet Take 0 25 mg by mouth in the morning        sildenafil (VIAGRA) 100 mg tablet Take 100 mg by mouth      Tocilizumab (ACTEMRA IV) Infuse into a venous catheter every 30 (thirty) days        triamcinolone (KENALOG) 0 1 % ointment APPLY TO SKIN TOPICALLY AT  BEDTIME AS NEEDED      TURMERIC CURCUMIN PO Take 1,500 mg by mouth Takes 2 tab tid with meals      Zinc Sulfate (ZINC 15 PO) Take 1 tablet by mouth daily       zolpidem (AMBIEN) 10 mg tablet Take 1 tablet (10 mg total) by mouth daily at bedtime as needed for sleep 30 tablet 0     No current facility-administered medications for this visit  Allergies   Allergen Reactions    Penicillins Anaphylaxis     Category:  Allergy;     Morphine Nausea Only    Sulfa Antibiotics Other (See Comments)     Told not to take it while on Enbrel      Immunizations:     Immunization History   Administered Date(s) Administered    COVID-19 MODERNA VACC 0 5 ML IM 03/29/2021, 05/13/2021, 09/10/2021    Hep B, adult 07/15/2013, 08/12/2013, 01/09/2014    INFLUENZA 09/22/2009, 09/13/2010, 09/21/2011, 12/05/2012, 10/10/2013, 10/10/2013, 10/17/2014, 12/08/2016, 12/01/2017, 10/04/2018, 12/04/2019, 10/06/2020    Influenza, injectable, quadrivalent, preservative free 0 5 mL 12/04/2019    Influenza, recombinant, quadrivalent,injectable, preservative free 10/04/2018    Pneumococcal Polysaccharide PPV23 12/21/2009    TD (adult) Preservative Free 06/15/1994    Td (adult), adsorbed 06/15/1994    Tdap 08/27/2016      Health Maintenance:         Topic Date Due    Colorectal Cancer Screening  10/12/2024    Hepatitis C Screening  Completed         Topic Date Due    Pneumococcal Vaccine: 65+ Years (2 - PCV) 12/21/2010    COVID-19 Vaccine (4 - Booster for Moderna series) 12/10/2021    Influenza Vaccine (1) 09/01/2022      Medicare Screening Tests and Risk Assessments:     Blanca Buerger is here for his Subsequent Wellness visit  Health Risk Assessment:   Patient rates overall health as good  Patient feels that their physical health rating is slightly worse  Patient is satisfied with their life  Eyesight was rated as slightly worse  Hearing was rated as slightly worse  Patient feels that their emotional and mental health rating is same  Patients states they are never, rarely angry  Patient states they are often unusually tired/fatigued  Pain experienced in the last 7 days has been a lot  Patient's pain rating has been 7/10  Patient states that he has experienced no weight loss or gain in last 6 months  Fall Risk Screening: In the past year, patient has experienced: no history of falling in past year      Home Safety:  Patient has trouble with stairs inside or outside of their home  Patient has working smoke alarms and has working carbon monoxide detector  Home safety hazards include: none  Medications:   Patient is not currently taking any over-the-counter supplements  Patient is able to manage medications  Activities of Daily Living (ADLs)/Instrumental Activities of Daily Living (IADLs):   Walk and transfer into and out of bed and chair?: Yes  Dress and groom yourself?: Yes    Bathe or shower yourself?: Yes    Feed yourself?  Yes  Do your laundry/housekeeping?: No  Manage your money, pay your bills and track your expenses?: No  Make your own meals?: No    Do your own shopping?: No    Previous Hospitalizations:   Any hospitalizations or ED visits within the last 12 months?: No      Advance Care Planning:   Living will: Yes    Durable POA for healthcare: Yes    Advanced directive: Yes      PREVENTIVE SCREENINGS      Cardiovascular Screening:    General: Screening Not Indicated and History Lipid Disorder      Colorectal Cancer Screening:     General: Screening Current      Abdominal Aortic Aneurysm (AAA) Screening:    Risk factors include: age between 73-67 yo        Lung Cancer Screening:     General: Screening Not Indicated      Hepatitis C Screening:    General: Screening Current    Screening, Brief Intervention, and Referral to Treatment (SBIRT)    Screening  Typical number of drinks in a day: 0  Typical number of drinks in a week: 0  Interpretation: Low risk drinking behavior  Single Item Drug Screening:  How often have you used an illegal drug (including marijuana) or a prescription medication for non-medical reasons in the past year? never    Single Item Drug Screen Score: 0  Interpretation: Negative screen for possible drug use disorder    Review of Current Opioid Use    Opioid Risk Tool (ORT) Interpretation: Complete Opioid Risk Tool (ORT)    No exam data present     Physical Exam:     /82 (BP Location: Left arm, Patient Position: Sitting, Cuff Size: Adult)   Pulse 67   Temp (!) 96 8 °F (36 °C) (Temporal)   Resp 16   SpO2 92%     Physical Exam  Vitals and nursing note reviewed  Constitutional:       Appearance: He is well-developed  HENT:      Head: Normocephalic and atraumatic  Right Ear: Tympanic membrane, ear canal and external ear normal       Left Ear: Tympanic membrane, ear canal and external ear normal       Nose: Nose normal    Eyes:      Conjunctiva/sclera: Conjunctivae normal    Cardiovascular:      Rate and Rhythm: Normal rate and regular rhythm  Heart sounds: No murmur heard  Pulmonary:      Effort: Pulmonary effort is normal  No respiratory distress  Breath sounds: Normal breath sounds  Abdominal:      General: Abdomen is flat  Bowel sounds are normal       Palpations: Abdomen is soft  Tenderness: There is no abdominal tenderness  Genitourinary:     Penis: Normal        Testes: Normal    Musculoskeletal:         General: Normal range of motion  Cervical back: Neck supple  Skin:     General: Skin is warm and dry  Capillary Refill: Capillary refill takes less than 2 seconds  Neurological:      General: No focal deficit present  Mental Status: He is alert and oriented to person, place, and time  Mental status is at baseline  Psychiatric:         Mood and Affect: Mood normal          Behavior: Behavior normal          Thought Content:  Thought content normal          Judgment: Judgment normal           Nae Montanez, DO

## 2022-10-04 NOTE — PATIENT INSTRUCTIONS
Medicare Preventive Visit Patient Instructions  Thank you for completing your Welcome to Medicare Visit or Medicare Annual Wellness Visit today  Your next wellness visit will be due in one year (10/5/2023)  The screening/preventive services that you may require over the next 5-10 years are detailed below  Some tests may not apply to you based off risk factors and/or age  Screening tests ordered at today's visit but not completed yet may show as past due  Also, please note that scanned in results may not display below  Preventive Screenings:  Service Recommendations Previous Testing/Comments   Colorectal Cancer Screening  Colonoscopy    Fecal Occult Blood Test (FOBT)/Fecal Immunochemical Test (FIT)  Fecal DNA/Cologuard Test  Flexible Sigmoidoscopy Age: 39-70 years old   Colonoscopy: every 10 years (May be performed more frequently if at higher risk)  OR  FOBT/FIT: every 1 year  OR  Cologuard: every 3 years  OR  Sigmoidoscopy: every 5 years  Screening may be recommended earlier than age 39 if at higher risk for colorectal cancer  Also, an individualized decision between you and your healthcare provider will decide whether screening between the ages of 74-80 would be appropriate   Colonoscopy: 10/12/2021  FOBT/FIT: Not on file  Cologuard: 10/12/2021  Sigmoidoscopy: Not on file          Prostate Cancer Screening Individualized decision between patient and health care provider in men between ages of 53-78   Medicare will cover every 12 months beginning on the day after your 50th birthday PSA: 0 2 ng/mL           Hepatitis C Screening Once for adults born between 1945 and 1965  More frequently in patients at high risk for Hepatitis C Hep C Antibody: 04/19/2019        Diabetes Screening 1-2 times per year if you're at risk for diabetes or have pre-diabetes Fasting glucose: 100 mg/dL (12/3/2018)  A1C: No results in last 5 years (No results in last 5 years)      Cholesterol Screening Once every 5 years if you don't have a lipid disorder  May order more often based on risk factors  Lipid panel: 01/01/2022         Other Preventive Screenings Covered by Medicare:  Abdominal Aortic Aneurysm (AAA) Screening: covered once if your at risk  You're considered to be at risk if you have a family history of AAA or a male between the age of 73-68 who smoking at least 100 cigarettes in your lifetime  Lung Cancer Screening: covers low dose CT scan once per year if you meet all of the following conditions: (1) Age 50-69; (2) No signs or symptoms of lung cancer; (3) Current smoker or have quit smoking within the last 15 years; (4) You have a tobacco smoking history of at least 20 pack years (packs per day x number of years you smoked); (5) You get a written order from a healthcare provider  Glaucoma Screening: covered annually if you're considered high risk: (1) You have diabetes OR (2) Family history of glaucoma OR (3)  aged 48 and older OR (3)  American aged 72 and older  Osteoporosis Screening: covered every 2 years if you meet one of the following conditions: (1) Have a vertebral abnormality; (2) On glucocorticoid therapy for more than 3 months; (3) Have primary hyperparathyroidism; (4) On osteoporosis medications and need to assess response to drug therapy  HIV Screening: covered annually if you're between the age of 12-76  Also covered annually if you are younger than 13 and older than 72 with risk factors for HIV infection  For pregnant patients, it is covered up to 3 times per pregnancy      Immunizations:  Immunization Recommendations   Influenza Vaccine Annual influenza vaccination during flu season is recommended for all persons aged >= 6 months who do not have contraindications   Pneumococcal Vaccine   * Pneumococcal conjugate vaccine = PCV13 (Prevnar 13), PCV15 (Vaxneuvance), PCV20 (Prevnar 20)  * Pneumococcal polysaccharide vaccine = PPSV23 (Pneumovax) Adults 25-60 years old: 1-3 doses may be recommended based on certain risk factors  Adults 72 years old: 1-2 doses may be recommended based off what pneumonia vaccine you previously received   Hepatitis B Vaccine 3 dose series if at intermediate or high risk (ex: diabetes, end stage renal disease, liver disease)   Tetanus (Td) Vaccine - COST NOT COVERED BY MEDICARE PART B Following completion of primary series, a booster dose should be given every 10 years to maintain immunity against tetanus  Td may also be given as tetanus wound prophylaxis  Tdap Vaccine - COST NOT COVERED BY MEDICARE PART B Recommended at least once for all adults  For pregnant patients, recommended with each pregnancy  Shingles Vaccine (Shingrix) - COST NOT COVERED BY MEDICARE PART B  2 shot series recommended in those aged 48 and above     Health Maintenance Due:      Topic Date Due    Colorectal Cancer Screening  10/12/2024    Hepatitis C Screening  Completed     Immunizations Due:      Topic Date Due    Pneumococcal Vaccine: 65+ Years (2 - PCV) 12/21/2010    COVID-19 Vaccine (4 - Booster for Moderna series) 12/10/2021    Influenza Vaccine (1) 09/01/2022     Advance Directives   What are advance directives? Advance directives are legal documents that state your wishes and plans for medical care  These plans are made ahead of time in case you lose your ability to make decisions for yourself  Advance directives can apply to any medical decision, such as the treatments you want, and if you want to donate organs  What are the types of advance directives? There are many types of advance directives, and each state has rules about how to use them  You may choose a combination of any of the following:  Living will: This is a written record of the treatment you want  You can also choose which treatments you do not want, which to limit, and which to stop at a certain time  This includes surgery, medicine, IV fluid, and tube feedings  Durable power of  for healthcare [facility]):   This is a written record that states who you want to make healthcare choices for you when you are unable to make them for yourself  This person, called a proxy, is usually a family member or a friend  You may choose more than 1 proxy  Do not resuscitate (DNR) order:  A DNR order is used in case your heart stops beating or you stop breathing  It is a request not to have certain forms of treatment, such as CPR  A DNR order may be included in other types of advance directives  Medical directive: This covers the care that you want if you are in a coma, near death, or unable to make decisions for yourself  You can list the treatments you want for each condition  Treatment may include pain medicine, surgery, blood transfusions, dialysis, IV or tube feedings, and a ventilator (breathing machine)  Values history: This document has questions about your views, beliefs, and how you feel and think about life  This information can help others choose the care that you would choose  Why are advance directives important? An advance directive helps you control your care  Although spoken wishes may be used, it is better to have your wishes written down  Spoken wishes can be misunderstood, or not followed  Treatments may be given even if you do not want them  An advance directive may make it easier for your family to make difficult choices about your care  © Copyright [employer] 2018 Information is for End User's use only and may not be sold, redistributed or otherwise used for commercial purposes  All illustrations and images included in CareNotes® are the copyrighted property of A D A M , Inc  or [facility] Omar Chandler   Here for AWV and is to take all meds as directed  AWV UTD and home is safe and advanced directives and living will  Home is safe  Take all meds as directed for high cholesterol and high blood pressure and a-fib, uses CPAP as directed and gerd   Sees rheumatology for RA and insomnia as well as pain meds for chronic pain  He did see [facility] Dr Antonio Pichardo for finger wound in past  F-up with all specialists as directed including cardiologist and rheumatologist and Dr Antonio Pichardo as directed  He does see his pain specialist as well

## 2022-10-05 ENCOUNTER — TELEPHONE (OUTPATIENT)
Dept: FAMILY MEDICINE CLINIC | Facility: CLINIC | Age: 67
End: 2022-10-05

## 2022-10-05 RX ORDER — PREDNISONE 1 MG/1
5 TABLET ORAL DAILY
COMMUNITY

## 2022-10-05 RX ORDER — PREDNISONE 1 MG/1
3 TABLET ORAL DAILY
COMMUNITY

## 2022-10-05 NOTE — TELEPHONE ENCOUNTER
Patient called stating on his summary that he is to stop his Prednisone and patient stated that he can't stop it  Are you able to correct this in his chart? Please advise patient at [phone]

## 2022-10-05 NOTE — TELEPHONE ENCOUNTER
I spoke to the patient he is currently taking Prednisone 8 mg daily in the form of one 5 mg tablet and three 1 mg tablets    I did place this back on the patient's active medication list

## 2022-10-21 DIAGNOSIS — K21.9 GASTROESOPHAGEAL REFLUX DISEASE WITHOUT ESOPHAGITIS: ICD-10-CM

## 2022-10-21 RX ORDER — ESOMEPRAZOLE MAGNESIUM 40 MG/1
40 CAPSULE, DELAYED RELEASE ORAL DAILY PRN
Qty: 90 CAPSULE | Refills: 3 | Status: SHIPPED | OUTPATIENT
Start: 2022-10-21

## 2022-12-28 DIAGNOSIS — G47.00 INSOMNIA, UNSPECIFIED TYPE: ICD-10-CM

## 2022-12-28 RX ORDER — ZOLPIDEM TARTRATE 10 MG/1
10 TABLET ORAL
Qty: 30 TABLET | Refills: 3 | Status: SHIPPED | OUTPATIENT
Start: 2022-12-28

## 2023-03-22 ENCOUNTER — OFFICE VISIT (OUTPATIENT)
Dept: FAMILY MEDICINE CLINIC | Facility: CLINIC | Age: 68
End: 2023-03-22

## 2023-03-22 ENCOUNTER — HOSPITAL ENCOUNTER (OUTPATIENT)
Dept: RADIOLOGY | Age: 68
Discharge: HOME | End: 2023-03-22

## 2023-03-22 VITALS
HEIGHT: 69 IN | BODY MASS INDEX: 24.44 KG/M2 | DIASTOLIC BLOOD PRESSURE: 70 MMHG | TEMPERATURE: 97.9 F | WEIGHT: 165 LBS | SYSTOLIC BLOOD PRESSURE: 122 MMHG

## 2023-03-22 DIAGNOSIS — N50.811 PAIN IN RIGHT TESTICLE: Primary | ICD-10-CM

## 2023-03-22 DIAGNOSIS — I48.0 PAF (PAROXYSMAL ATRIAL FIBRILLATION) (HCC): ICD-10-CM

## 2023-03-22 DIAGNOSIS — D84.821 IMMUNOSUPPRESSION DUE TO CHRONIC STEROID USE (HCC): ICD-10-CM

## 2023-03-22 DIAGNOSIS — N50.811 PAIN IN RIGHT TESTICLE: ICD-10-CM

## 2023-03-22 DIAGNOSIS — F11.29 OPIOID DEPENDENCE WITH OPIOID-INDUCED DISORDER (HCC): ICD-10-CM

## 2023-03-22 DIAGNOSIS — T38.0X5A IMMUNOSUPPRESSION DUE TO CHRONIC STEROID USE (HCC): ICD-10-CM

## 2023-03-22 DIAGNOSIS — L97.512 RIGHT FOOT ULCER, WITH FAT LAYER EXPOSED (HCC): ICD-10-CM

## 2023-03-22 DIAGNOSIS — Z79.52 IMMUNOSUPPRESSION DUE TO CHRONIC STEROID USE (HCC): ICD-10-CM

## 2023-03-22 DIAGNOSIS — M06.9 RHEUMATOID ARTHRITIS, INVOLVING UNSPECIFIED SITE, UNSPECIFIED WHETHER RHEUMATOID FACTOR PRESENT (HCC): ICD-10-CM

## 2023-03-22 DIAGNOSIS — I70.0 AORTIC ATHEROSCLEROSIS (HCC): ICD-10-CM

## 2023-03-22 NOTE — PROGRESS NOTES
Chief Complaint   Patient presents with   • Testicle Pain      X 2-3 days  Name: Rell Braga      :       MRN: [id number]  Encounter Provider: Robinson Villarreal MD  Encounter Date: 3/22/2023   Encounter department: [address] [place] [address],[address] present during exam   Right testicle does seem a bit larger than the left testicle  Some pain to palpation  We will send stat ultrasound  Start ED precautions if pain increases  Continue follow-up with rheumatology as well as cardiology  Continue other medications as prescribed  1  Pain in right testicle  -     US scrotum and testicles; Future; Expected date: 2023    2  Right foot ulcer, with fat layer exposed ([facility] [address] [address] )    3  Aortic atherosclerosis ([facility] [address] )    4  Immunosuppression due to chronic steroid use ([facility] [address] [address] )    5  Rheumatoid arthritis, involving unspecified site, unspecified whether rheumatoid factor present ([facility] [address] )    6  Opioid dependence with opioid-induced disorder ([facility] [address] )    7  PAF (paroxysmal atrial fibrillation) ([facility] [address] )         Subjective      He presents today to discuss testicular pain for the past few weeks  For the past 2 days it is gotten worse  States it is mostly in the right testicle  Denies any problems with urination  He is concerned that his testicles are twisted  Well otherwise  Follows up with specialist for rheumatoid arthritis  Uses steroids daily with a recent increase in his steroid use  Continues to take metoprolol and aspirin for A-fib  Review of Systems   Constitutional: Negative for activity change, appetite change, chills, fatigue and fever  HENT: Negative for congestion, rhinorrhea, sneezing and sore throat  Eyes: Negative for pain, discharge, redness and itching  Respiratory: Negative for cough, chest tightness, shortness of breath and wheezing  Cardiovascular: Negative for chest pain and palpitations     Gastrointestinal: Negative for abdominal pain, constipation, diarrhea, nausea and vomiting  Genitourinary: Positive for testicular pain  Negative for difficulty urinating, dysuria, penile pain, penile swelling and scrotal swelling  Musculoskeletal: Negative for arthralgias, myalgias and neck pain  Skin: Negative for rash  Neurological: Negative for dizziness, weakness, numbness and headaches  Hematological: Negative for adenopathy  Psychiatric/Behavioral: Negative for confusion and suicidal ideas  The patient is not nervous/anxious          Current Outpatient Medications on File Prior to Visit   Medication Sig   • Acetaminophen (Tylenol) 325 MG CAPS Take 650 mg by mouth every 6 (six) hours as needed     • ascorbic acid (VITAMIN C) 500 mg tablet Take 500 mg by mouth 4 (four) times a day   • aspirin (ECOTRIN LOW STRENGTH) 81 mg EC tablet Take 81 mg by mouth every other day   • atorvastatin (LIPITOR) 40 mg tablet TAKE 1 TABLET BY MOUTH EVERY DAY AT NIGHT   • B-COMPLEX-C PO Take 1 tablet by mouth daily    • Boswellia Sally (BOSWELLIA PO) Take by mouth 400 mg 3 x's a day   • celecoxib (CeleBREX) 200 mg capsule Take 200 mg by mouth 2 (two) times a day   • Certolizumab Pegol (CIMZIA SC) Inject under the skin   • chlorhexidine (PERIDEX) 0 12 % solution chlorhexidine gluconate 0 12 % mouthwash   RINSE MOUTH WITH 1/2 (HALF) OUNCE 2 TIMES DAILY AS DIRECTED   • Cholecalciferol (VITAMIN D3) 3000 units TABS Take 2 tablets by mouth daily   • Coenzyme Q10 (CO Q 10) 100 MG CAPS Take 1 capsule by mouth daily    • cyanocobalamin (VITAMIN B-12) 100 mcg tablet Take by mouth daily   • diphenhydrAMINE (BENADRYL) 25 mg tablet Take 25 mg by mouth 3 (three) times a day with meals allergies   • esomeprazole (NexIUM) 40 MG capsule Take 1 capsule (40 mg total) by mouth daily as needed (gerd)   • gabapentin (NEURONTIN) 600 MG tablet Take 1,800 mg by mouth daily at bedtime     • glucosamine-chondroitin 500-400 MG tablet Take 1 tablet by mouth 3 (three) times a day   • itraconazole (SPORANOX) 100 mg capsule Take 200 mg by mouth 2 (two) times a day   • MAGNESIUM PO Take 100 mg by mouth Takes 6 time daily spread throughout the day   • metoprolol succinate (Toprol XL) 50 mg 24 hr tablet Take 50 mg by mouth daily at bedtime     • milk thistle 175 MG tablet Take 175 mg by mouth daily   • minocycline (DYNACIN) 50 MG tablet Take 50 mg by mouth 2 (two) times a day   • Multiple Vitamin (multivitamin) tablet Take 1 tablet by mouth daily   • OIL OF OREGANO PO Take 1 tablet by mouth daily    • Omega-3 Fatty Acids (FISH OIL PO) Take 2 g by mouth 3 (three) times a day with meals    • orphenadrine (NORFLEX) 100 mg tablet Take 1 tablet (100 mg total) by mouth 3 (three) times a day as needed for muscle spasms Medically necessary, and needs to state 3 times daily   • oxyCODONE (ROXICODONE) 10 MG TABS Take 10 mg by mouth 4 (four) times a day    • pramipexole (MIRAPEX) 0 25 mg tablet Take 0 25 mg by mouth in the morning     • predniSONE 1 mg tablet Take 3 mg by mouth daily   • predniSONE 5 mg tablet Take 5 mg by mouth daily   • sildenafil (VIAGRA) 100 mg tablet Take 100 mg by mouth   • Tocilizumab (ACTEMRA IV) Infuse into a venous catheter every 30 (thirty) days     • triamcinolone (KENALOG) 0 1 % ointment APPLY TO SKIN TOPICALLY AT  BEDTIME AS NEEDED   • TURMERIC CURCUMIN PO Take 1,500 mg by mouth Takes 2 tab tid with meals   • Zinc Sulfate (ZINC 15 PO) Take 1 tablet by mouth daily    • zolpidem (AMBIEN) 10 mg tablet Take 1 tablet (10 mg total) by mouth daily at bedtime as needed for sleep   • celecoxib (CeleBREX) 100 mg capsule Celebrex (Patient not taking: Reported on 3/22/2023)       Objective     /70   Temp 97 9 °F (36 6 °C)   Ht 5' 9" (1 753 m)   Wt 74 8 kg (165 lb)   BMI 24 37 kg/m²     Physical Exam  Vitals reviewed  Exam conducted with a chaperone present  Constitutional:       General: He is not in acute distress  Appearance: Normal appearance  He is well-developed   He is not toxic-appearing or diaphoretic  HENT:      Head: Normocephalic and atraumatic  Nose: Nose normal       Mouth/Throat:      Pharynx: No oropharyngeal exudate  Eyes:      General: No scleral icterus  Right eye: No discharge  Left eye: No discharge  Conjunctiva/sclera: Conjunctivae normal    Cardiovascular:      Rate and Rhythm: Normal rate and regular rhythm  Heart sounds: Normal heart sounds  No murmur heard  Pulmonary:      Effort: Pulmonary effort is normal  No respiratory distress  Breath sounds: Normal breath sounds  No wheezing  Abdominal:      General: Bowel sounds are normal  There is no distension  Palpations: Abdomen is soft  Tenderness: There is no abdominal tenderness  Genitourinary:     Penis: Normal        Comments: Right testicle swelling  Musculoskeletal:         General: No tenderness  Normal range of motion  Skin:     General: Skin is warm  Findings: No erythema or rash  Neurological:      Mental Status: He is alert     Psychiatric:         Mood and Affect: Mood normal          Behavior: Behavior normal        Kin MD Amarjit

## 2023-03-23 ENCOUNTER — TELEPHONE (OUTPATIENT)
Dept: FAMILY MEDICINE CLINIC | Facility: CLINIC | Age: 68
End: 2023-03-23

## 2023-03-23 ENCOUNTER — TELEPHONE (OUTPATIENT)
Dept: UROLOGY | Facility: MEDICAL CENTER | Age: 68
End: 2023-03-23

## 2023-03-23 DIAGNOSIS — N43.3 HYDROCELE IN ADULT: Primary | ICD-10-CM

## 2023-03-23 NOTE — TELEPHONE ENCOUNTER
Please Triage  New Patient    What is the reason for the patient’s appointment? Hydrocele in adult    What office location does the patient prefer? any  Imaging/Lab Results: US   No signs of torsion, orchitis or epididymitis      Small bilateral complex hydroceles     Do we accept the patient's insurance or is the patient Self-Pay? Insurance Provider:[employer]   Plan Type/Number:  Member ID#: Has the patient had any previous Urologist(s)? No   Have patient records been requested? If not are records showing in Epic: in epic     Has the patient had any outside testing done? In epic   Does the patient have a personal history of cancer?  No

## 2023-03-23 NOTE — TELEPHONE ENCOUNTER
Patient called wanted to know what referral was placed  Made patient aware Dr Luis Dorantes placed order to Urology

## 2023-04-13 PROBLEM — K40.90 RIGHT INGUINAL HERNIA: Status: ACTIVE | Noted: 2023-04-13

## 2023-04-13 PROBLEM — K59.00 CONSTIPATION: Status: ACTIVE | Noted: 2023-04-13

## 2023-04-13 PROBLEM — M62.08 RECTUS DIASTASIS: Status: ACTIVE | Noted: 2023-04-13

## 2023-05-16 ENCOUNTER — HOSPITAL ENCOUNTER (OUTPATIENT)
Dept: GASTROENTEROLOGY | Facility: AMBULARY SURGERY CENTER | Age: 68
Setting detail: OUTPATIENT SURGERY
Discharge: HOME | End: 2023-05-16
Attending: SURGERY

## 2023-05-16 ENCOUNTER — ANESTHESIA EVENT (OUTPATIENT)
Dept: ANESTHESIOLOGY | Facility: HOSPITAL | Age: 68
End: 2023-05-16

## 2023-05-16 ENCOUNTER — ANESTHESIA (OUTPATIENT)
Dept: ANESTHESIOLOGY | Facility: HOSPITAL | Age: 68
End: 2023-05-16

## 2023-05-16 ENCOUNTER — ANESTHESIA EVENT (OUTPATIENT)
Dept: GASTROENTEROLOGY | Facility: AMBULARY SURGERY CENTER | Age: 68
End: 2023-05-16

## 2023-05-16 ENCOUNTER — ANESTHESIA (OUTPATIENT)
Dept: GASTROENTEROLOGY | Facility: AMBULARY SURGERY CENTER | Age: 68
End: 2023-05-16

## 2023-05-16 VITALS
TEMPERATURE: 97 F | HEIGHT: 68 IN | WEIGHT: 150 LBS | DIASTOLIC BLOOD PRESSURE: 61 MMHG | HEART RATE: 60 BPM | OXYGEN SATURATION: 97 % | SYSTOLIC BLOOD PRESSURE: 109 MMHG | RESPIRATION RATE: 18 BRPM | BODY MASS INDEX: 22.73 KG/M2

## 2023-05-16 DIAGNOSIS — R10.30 LOWER ABDOMINAL PAIN: Primary | ICD-10-CM

## 2023-05-16 DIAGNOSIS — Z12.11 ENCOUNTER FOR SCREENING COLONOSCOPY: ICD-10-CM

## 2023-05-16 RX ORDER — PROPOFOL 10 MG/ML
INJECTION, EMULSION INTRAVENOUS AS NEEDED
Status: DISCONTINUED | OUTPATIENT
Start: 2023-05-16 | End: 2023-05-16

## 2023-05-16 RX ORDER — PANTOPRAZOLE SODIUM 40 MG/10ML
40 INJECTION, POWDER, LYOPHILIZED, FOR SOLUTION INTRAVENOUS ONCE
Status: DISCONTINUED | OUTPATIENT
Start: 2023-05-16 | End: 2023-05-16

## 2023-05-16 RX ORDER — FAMOTIDINE 10 MG/ML
20 INJECTION, SOLUTION INTRAVENOUS ONCE
Status: DISCONTINUED | OUTPATIENT
Start: 2023-05-16 | End: 2023-05-20 | Stop reason: HOSPADM

## 2023-05-16 RX ORDER — FAMOTIDINE 10 MG/ML
INJECTION, SOLUTION INTRAVENOUS AS NEEDED
Status: DISCONTINUED | OUTPATIENT
Start: 2023-05-16 | End: 2023-05-16

## 2023-05-16 RX ORDER — ONDANSETRON 2 MG/ML
4 INJECTION INTRAMUSCULAR; INTRAVENOUS EVERY 6 HOURS PRN
Status: DISCONTINUED | OUTPATIENT
Start: 2023-05-16 | End: 2023-05-20 | Stop reason: HOSPADM

## 2023-05-16 RX ORDER — SODIUM CHLORIDE, SODIUM LACTATE, POTASSIUM CHLORIDE, CALCIUM CHLORIDE 600; 310; 30; 20 MG/100ML; MG/100ML; MG/100ML; MG/100ML
INJECTION, SOLUTION INTRAVENOUS CONTINUOUS PRN
Status: DISCONTINUED | OUTPATIENT
Start: 2023-05-16 | End: 2023-05-16

## 2023-05-16 RX ADMIN — PROPOFOL 50 MG: 10 INJECTION, EMULSION INTRAVENOUS at 13:15

## 2023-05-16 RX ADMIN — FAMOTIDINE 20 MG: 10 INJECTION, SOLUTION INTRAVENOUS at 13:04

## 2023-05-16 RX ADMIN — SODIUM CHLORIDE, SODIUM LACTATE, POTASSIUM CHLORIDE, AND CALCIUM CHLORIDE: .6; .31; .03; .02 INJECTION, SOLUTION INTRAVENOUS at 12:59

## 2023-05-16 RX ADMIN — PROPOFOL 50 MG: 10 INJECTION, EMULSION INTRAVENOUS at 13:21

## 2023-05-16 RX ADMIN — PROPOFOL 50 MG: 10 INJECTION, EMULSION INTRAVENOUS at 13:18

## 2023-05-16 RX ADMIN — ONDANSETRON 4 MG: 2 INJECTION INTRAMUSCULAR; INTRAVENOUS at 12:56

## 2023-05-16 RX ADMIN — PROPOFOL 50 MG: 10 INJECTION, EMULSION INTRAVENOUS at 13:11

## 2023-05-16 RX ADMIN — PROPOFOL 100 MG: 10 INJECTION, EMULSION INTRAVENOUS at 13:07

## 2023-05-16 NOTE — ANESTHESIA PREPROCEDURE EVALUATION
Procedure:  PRE-OP ONLY    Relevant Problems   ANESTHESIA (within normal limits)      CARDIO   (+) Aortic atherosclerosis (HCC)   (+) Hyperlipidemia   (+) PAF (paroxysmal atrial fibrillation) (HCC)      GI/HEPATIC   (+) GERD (gastroesophageal reflux disease)      MUSCULOSKELETAL   (+) DDD (degenerative disc disease), lumbar   (+) Rectus diastasis   (+) Rheumatoid arthritis (HCC)      NEURO/PSYCH   (+) Chronic pain disorder      PULMONARY   (+) Sleep apnea   Prev records reviewed      Recent labs personally reviewed:  Lab Results   Component Value Date    WBC 6 54 12/03/2018    HGB 15 6 12/03/2018     12/03/2018     Lab Results   Component Value Date     06/17/2015    K 4 7 12/03/2018    BUN 18 12/03/2018    CREATININE 0 75 12/03/2018    GLUCOSE 125 06/17/2015     Lab Results   Component Value Date    PTT 30 06/03/2015      Lab Results   Component Value Date    INR 1 08 06/03/2015       No results found for: HGBA1C    Type and Screen:  O

## 2023-05-16 NOTE — H&P
"Vitamin D 400 IU (over the counter, d-vi-sol or baby d drops)    Weight check next week  Can do another one at 4-5 weeks of age  2 month [facility]    Preventive Care at the [facility] Visit    Growth Measurements & Percentiles  Head Circumference: 14\" (35.6 cm) (44 %, Source: WHO (Boys, 0-2 years)) 44 %ile based on WHO (Boys, 0-2 years) head circumference-for-age data using vitals from 2018.   Birth Weight: 7 lbs 13 oz   Weight: 7 lbs 9.4 oz / 3.44 kg (actual weight) / 21 %ile based on WHO (Boys, 0-2 years) weight-for-age data using vitals from 2018.   Length: 1' 9.339\" / 54.2 cm 86 %ile based on WHO (Boys, 0-2 years) length-for-age data using vitals from 2018.   Weight for length: <1 %ile based on WHO (Boys, 0-2 years) weight-for-recumbent length data using vitals from 2018.    Recommended preventive visits for your :  2 weeks old  2 months old    Here s what your baby might be doing from birth to 2 months of age.    Growth and development    Begins to smile at familiar faces and voices, especially parents  voices.    Movements become less jerky.    Lifts chin for a few seconds when lying on the tummy.    Cannot hold head upright without support.    Holds onto an object that is placed in his hand.    Has a different cry for different needs, such as hunger or a wet diaper.    Has a fussy time, often in the evening.  This starts at about 2 to 3 weeks of age.    Makes noises and cooing sounds.    Usually gains 4 to 5 ounces per week.      Vision and hearing    Can see about one foot away at birth.  By 2 months, he can see about 10 feet away.    Starts to follow some moving objects with eyes.  Uses eyes to explore the world.    Makes eye contact.    Can see colors.    Hearing is fully developed.  He will be startled by loud sounds.    Things you can do to help your child  1. Talk and sing to your baby often.  2. Let your baby look at faces and bright colors.    All babies are different    The " History and Physical -General Surgical Care   Kaylyn Barragan 75 y o  male MRN: [id number]  Unit/Bed#:  Encounter: [id number]       Principal Problem: Screening colonoscopy    HPI: Kaylyn Barragan is a 75y o  year old male who presents for screening colonoscopy  Last colonoscopy was greater than 10 years ago  Please see office note from April 13    He also has a right inguinal hernia we are planning to fix in the future      Review of Systems    Historical Information   Past Medical History:   Diagnosis Date   • Acute right lumbar radiculopathy    • Allergic rhinitis    • Anxiety    • Arthritis    • Chronic pain disorder    • CPAP (continuous positive airway pressure) dependence     Bi-Pap machine   • Depression    • Dislocation     Right hip prone to dislocations with sudden movement   • GERD (gastroesophageal reflux disease)    • Hyperlipidemia    • Insomnia    • PAF (paroxysmal atrial fibrillation) ([facility])    • Pain in both lower extremities    • Pain in left shoulder    • RA (rheumatoid arthritis) ([facility] [address] )    • Restless leg    • Rheumatoid arthritis ([facility] [address] )     last assessed 07/29/16   • Seasonal allergies    • Sleep apnea     partial per pt     Past Surgical History:   Procedure Laterality Date   • BACK SURGERY      lami L 1-2-3-4   • COLONOSCOPY     • ELBOW SURGERY Right     tendon repair   • FINGER SURGERY Right 12/29/2021    R 4th finger   • FOOT SURGERY Bilateral    • INGUINAL HERNIA REPAIR     • JOINT REPLACEMENT Bilateral     both hips twice   • KNEE SURGERY     • NERVE BLOCK Right     peripheral nerve block wrist radial   • PALATOPHARYNGOPLASTY     • GA AMPUTATION TOE METATARSOPHALANGEAL JOINT Right 4/12/2022    Procedure: 2nd toe amputation at [facility];  Surgeon: Lorella Schirmer, DPM;  [facility];  Service: Podiatry   • GA CORRECTION HAMMERTOE Right 1/25/2021    Procedure: 2ND MPJ RELEASE;  Surgeon: Cyn Mcgovern DPM;  [facility];  Service: Podiatry   • GA CORRJ [address] W/SESMDC W/DIST Ann-Marie July "information here shows average development.  All babies develop at their own rate.  Certain behaviors and physical milestones tend to occur at certain ages, but there is a wide range of growth and behavior that is normal.  Your baby might reach some milestones earlier or later than the average child.  If you have any concerns about your baby s development, talk with your doctor or nurse.      Feeding  The only food your baby needs right now is breast milk or iron-fortified formula.  Your baby does not need water at this age.  Ask your doctor about giving your baby a Vitamin D supplement.    Breastfeeding tips    Breastfeed every 2-4 hours. If your baby is sleepy - use breast compression, push on chin to \"start up\" baby, switch breasts, undress to diaper and wake before relatching.     Some babies \"cluster\" feed every 1 hour for a while- this is normal. Feed your baby whenever he/she is awake-  even if every hour for a while. This frequent feeding will help you make more milk and encourage your baby to sleep for longer stretches later in the evening or night.      Position your baby close to you with pillows so he/she is facing you -belly to belly laying horizontally across your lap at the level of your breast and looking a bit \"upwards\" to your breast     One hand holds the baby's neck behind the ears and the other hand holds your breast    Baby's nose should start out pointing to your nipple before latching    Hold your breast in a \"sandwich\" position by gently squeezing your breast in an oval shape and make sure your hands are not covering the areola    This \"nipple sandwich\" will make it easier for your breast to fit inside the baby's mouth-making latching more comfortable for you and baby and preventing sore nipples. Your baby should take a \"mouthful\" of breast!    You may want to use hand expression to \"prime the pump\" and get a drip of milk out on your nipple to wake baby     (see website: " OSTEOT Right 1/25/2021    Procedure: Danica Pruett;  Surgeon: Lam Vega DPM;  [facility];  Service: Podiatry   • NV STAB PHLEBT VARICOSE VEINS 1 XTR 10-20 STAB INCS Left 12/14/2018    Procedure: LOWER EXTREMITY MICRO PHLEBECTOMIES;  Surgeon: Viri Pastor DO;  [facility];  Service: Vascular   • SHOULDER SURGERY Left     dislocation repair of muscle   • SINUS SURGERY     • TONSILLECTOMY AND ADENOIDECTOMY     • UMBILICAL HERNIA REPAIR       Social History   Social History     Substance and Sexual Activity   Alcohol Use Yes   • Alcohol/week: 2 0 standard drinks   • Types: 2 Cans of beer per week    Comment: socially; 1-2 drinks every other week     Social History     Substance and Sexual Activity   Drug Use Yes   • Types: Marijuana    Comment: CBD tincture HS     Social History     Tobacco Use   Smoking Status Never   Smokeless Tobacco Never     Family History   Problem Relation Age of Onset   • Heart disease Mother    • Heart failure Mother    • Transient ischemic attack Father    • Gout Brother    • Stroke Family         CVA       Meds/Allergies     (Not in a hospital admission)    Current Facility-Administered Medications   Medication Dose Route Frequency   • ondansetron (ZOFRAN) injection 4 mg  4 mg Intravenous Q6H PRN   • pantoprazole (PROTONIX) injection 40 mg  40 mg Intravenous Once       Allergies   Allergen Reactions   • Penicillins Anaphylaxis     Category: Allergy;    • Morphine Nausea Only   • Sulfa Antibiotics Other (See Comments)     Told not to take it while on Enbrel           There were no vitals taken for this visit  No intake or output data in the 24 hours ending 05/16/23 1249    PHYSICAL EXAM  General appearance: alert and oriented, in no acute distress  Lungs: clear to auscultation bilaterally  Heart: regular rate and rhythm, S1, S2 normal, no murmur, click, rub or gallop  Abdomen: soft, non-tender; bowel sounds normal; no masses,  no organomegaly    Small "newborns.[place].edu/Breastfeeding/HandExpression.html)    Swipe your nipple on baby's upper lip and wait for a BIG open mouth    YOU bring baby to the breast (hold baby's neck with your fingers just below the ears) and bring baby's head to the breast--leading with the chin.  Try to avoid pushing your breast into baby's mouth- bring baby to you instead!    Aim to get your baby's bottom lip LOW DOWN ON AREOLA (baby's upper lip just needs to \"clear\" the nipple).     Your baby should latch onto the areola and NOT just the nipple. That way your baby gets more milk and you don't get sore nipples!     Websites about breastfeeding  www.womenshealth.gov/breastfeeding - many topics and videos   www.Drop Messages  - general information and videos about latching  http://newborns.[place].edu/Breastfeeding/HandExpression.html - video about hand expression   http://newborns.[place].edu/Breastfeeding/ABCs.html#ABCs  - general information  Claim Maps.CollegeMapper.[employer] - [facility] - information about breastfeeding and support groups    Formula  General guidelines    Age   # time/day   Serving Size     0-1 Month   6-8 times   2-4 oz     1-2 Months   5-7 times   3-5 oz     2-3 Months   4-6 times   4-7 oz     3-4 Months    4-6 times   5-8 oz       If bottle feeding your baby, hold the bottle.  Do not prop it up.    During the daytime, do not let your baby sleep more than four hours between feedings.  At night, it is normal for young babies to wake up to eat about every two to four hours.    Hold, cuddle and talk to your baby during feedings.    Do not give any other foods to your baby.  Your baby s body is not ready to handle them.    Babies like to suck.  For bottle-fed babies, try a pacifier if your baby needs to suck when not feeding.  If your baby is breastfeeding, try having him suck on your finger for comfort--wait two to three weeks (or until breast feeding is well established) before giving a pacifier, so the baby " "right inguinal hernia  Rectus diastases  Rectal: deferred  Skin: Skin color, texture, turgor normal  No rashes or lesions    Lab Results:   No visits with results within 1 Day(s) from this visit  Latest known visit with results is:   Admission on 04/12/2022, Discharged on 04/12/2022   Component Date Value   • Case Report 04/12/2022                      Value:Surgical Pathology Report                         Case: [id number]                                   Authorizing Provider:  DARRIUS Chapman [facility]          Collected:           04/12/2022 1106              Ordering Location:     [facility]        Received:            04/12/2022 [address]                                                     Pathologist:           Prasanna Vásquez MD                                                    Specimen:    Toe, Left, left second toe                                                                • Final Diagnosis 04/12/2022                      Value: This result contains rich text formatting which cannot be displayed here  • Additional Information 04/12/2022                      Value: This result contains rich text formatting which cannot be displayed here  • Gross Description 04/12/2022                      Value: This result contains rich text formatting which cannot be displayed here  Imaging Studies:     ASSESSMENT: Need for screening colonoscopy  Right inguinal hernia  History of rheumatoid arthritis     PLAN: Risks and benefits of a colonoscopy had been discussed with him and he agrees to proceed    Upon further discussion in the precolonoscopy suite, states his right inguinal hernia does not bother him and he does not want to proceed with repair of the right inguinal hernia  Does mention the \"protrusion\" of his lower abdomen  Suspect this is the result of his severe kyphosis    We will obtain a CAT scan with p o  contrast to further " assess  Counseling / Coordination of Care  Total time spent today  20 minutes  Greater than 50% of total time was spent with the patient and / or family counseling and / or coordination of care  learns to latch well first.    Never put formula or breast milk in the microwave.    To warm a bottle of formula or breast milk, place it in a bowl of warm water for a few minutes.  Before feeding your baby, make sure the breast milk or formula is not too hot.  Test it first by squirting it on the inside of your wrist.    Concentrated liquid or powdered formulas need to be mixed with water.  Follow the directions on the can.      Sleeping    Most babies will sleep about 16 hours a day or more.    You can do the following to reduce the risk of SIDS (sudden infant death syndrome):    Place your baby on his back.  Do not place your baby on his stomach or side.    Do not put pillows, loose blankets or stuffed animals under or near your baby.    If you think you baby is cold, put a second sleep sack on your child.    Never smoke around your baby.      If your baby sleeps in a crib or bassinet:    If you choose to have your baby sleep in a crib or bassinet, you should:      Use a firm, flat mattress.    Make sure the railings on the crib are no more than 2 3/8 inches apart.  Some older cribs are not safe because the railings are too far apart and could allow your baby s head to become trapped.    Remove any soft pillows or objects that could suffocate your baby.    Check that the mattress fits tightly against the sides of the bassinet or the railings of the crib so your baby s head cannot be trapped between the mattress and the sides.    Remove any decorative trimmings on the crib in which your baby s clothing could be caught.    Remove hanging toys, mobiles, and rattles when your baby can begin to sit up (around 5 or 6 months)    Lower the level of the mattress and remove bumper pads when your baby can pull himself to a standing position, so he will not be able to climb out of the crib.    Avoid loose bedding.      Elimination    Your baby:    May strain to pass stools (bowel movements).  This is normal as long as the  stools are soft, and he does not cry while passing them.    Has frequent, soft stools, which will be runny or pasty, yellow or green and  seedy.   This is normal.    Usually wets at least six diapers a day.      Safety      Always use an approved car seat.  This must be in the back seat of the car, facing backward.  For more information, check out www.seatcheck.org.    Never leave your baby alone with small children or pets.    Pick a safe place for your baby s crib.  Do not use an older drop-side crib.    Do not drink anything hot while holding your baby.    Don t smoke around your baby.    Never leave your baby alone in water.  Not even for a second.    Do not use sunscreen on your baby s skin.  Protect your baby from the sun with hats and canopies, or keep your baby in the shade.    Have a carbon monoxide detector near the furnace area.    Use properly working smoke detectors in your house.  Test your smoke detectors when daylight savings time begins and ends.      When to call the doctor    Call your baby s doctor or nurse if your baby:      Has a rectal temperature of 100.4 F (38 C) or higher.    Is very fussy for two hours or more and cannot be calmed or comforted.    Is very sleepy and hard to awaken.      What you can expect      You will likely be tired and busy    Spend time together with family and take time to relax.    If you are returning to work, you should think about .    You may feel overwhelmed, scared or exhausted.  Ask family or friends for help.  If you  feel blue  for more than 2 weeks, call your doctor.  You may have depression.    Being a parent is the biggest job you will ever have.  Support and information are important.  Reach out for help when you feel the need.      For more information on recommended immunizations:    www.cdc.gov/nip    For general medical information and more  Immunization facts go  to:  www.aap.org  www.aafp.org  www.fairview.org  www.cdc.gov/hepatitis  www.immunize.org  www.immunize.org/express  www.immunize.org/stories  www.vaccines.org    For early childhood family education programs in your school district, go to: www1.Grab[employer].net/~josh    For help with food, housing, clothing, medicines and other essentials, call:  [employer] - at 955-398-7789      How often should my child/teen be seen for well check-ups?      [place] (5-8 days)    2 weeks    2 months    4 months    6 months    9 months    12 months    15 months    18 months    24 months    30 months    3 years and every year through 18 years of age

## 2023-05-16 NOTE — ANESTHESIA PREPROCEDURE EVALUATION
Procedure:  COLONOSCOPY    Relevant Problems   ANESTHESIA (within normal limits)      CARDIO   (+) Aortic atherosclerosis (HCC)   (+) Hyperlipidemia   (+) PAF (paroxysmal atrial fibrillation) (HCC)      GI/HEPATIC   (+) GERD (gastroesophageal reflux disease)      MUSCULOSKELETAL   (+) DDD (degenerative disc disease), lumbar   (+) Rectus diastasis   (+) Rheumatoid arthritis (HCC)      NEURO/PSYCH   (+) Chronic pain disorder      PULMONARY   (+) Sleep apnea   Prev records reviewed  Recent labs personally reviewed:  Lab Results   Component Value Date    WBC 6 54 12/03/2018    HGB 15 6 12/03/2018     12/03/2018     Lab Results   Component Value Date     06/17/2015    K 4 7 12/03/2018    BUN 18 12/03/2018    CREATININE 0 75 12/03/2018    GLUCOSE 125 06/17/2015     Lab Results   Component Value Date    PTT 30 06/03/2015      Lab Results   Component Value Date    INR 1 08 06/03/2015       No results found for: HGBA1C    Type and Screen:  O        Physical Exam    Airway    Mallampati score: II  TM Distance: >3 FB  Neck ROM: full     Dental   No notable dental hx     Cardiovascular  Cardiovascular exam normal    Pulmonary  Pulmonary exam normal     Other Findings        Anesthesia Plan  ASA Score- 2     Anesthesia Type- IV sedation with anesthesia with ASA Monitors  Additional Monitors:   Airway Plan:           Plan Factors-Exercise tolerance (METS): >4 METS  Chart reviewed  EKG reviewed  Imaging results reviewed  Existing labs reviewed  Patient summary reviewed  Patient is not a current smoker  Patient not instructed to abstain from smoking on day of procedure  Patient did not smoke on day of surgery  Obstructive sleep apnea risk education given perioperatively  Induction- intravenous  Postoperative Plan-     Informed Consent- Anesthetic plan and risks discussed with patient  I personally reviewed this patient with the CRNA   Discussed and agreed on the Anesthesia Plan with the SUZANNE Snyder Yolis

## 2023-05-16 NOTE — ADDENDUM NOTE
Addendum  created 05/16/23 1336 by Mao Brown MD    Order list changed, Pharmacy for encounter modified

## 2023-05-16 NOTE — ANESTHESIA POSTPROCEDURE EVALUATION
Post-Op Assessment Note    CV Status:  Stable  Pain Score: 0    Pain management: adequate     Mental Status:  Arousable and sleepy   Hydration Status:  Stable   PONV Controlled:  Controlled   Airway Patency:  Patent      Post Op Vitals Reviewed: Yes      Staff: CRNA         No notable events documented      BP   111/59   q 97 6   Pulse 51   Resp 14   SpO2 99

## 2023-05-16 NOTE — PROGRESS NOTES
"Discussed pre-colonoscopy   C/o \"protrusion\" abdomen  Likely from severe kyphosis  No signs of lower abdominal hernia on examination  He does have a small right inguinal hernia noted during my exam on April 13  He added that he no longer wants to proceed with right inguinal hernia surgery      Discussed performing a CT scan with p o  contrast and he is agreeable    "

## 2023-05-16 NOTE — DISCHARGE INSTR - AVS FIRST PAGE
Normal colonoscopy    Recommend daily fiber supplement and plenty of fluids    I will call you once CT scan has been completed with results

## 2023-05-23 ENCOUNTER — CONSULT (OUTPATIENT)
Dept: SURGERY | Facility: CLINIC | Age: 68
End: 2023-05-23

## 2023-05-23 ENCOUNTER — APPOINTMENT (OUTPATIENT)
Dept: LAB | Age: 68
End: 2023-05-23

## 2023-05-23 VITALS
TEMPERATURE: 97.3 F | BODY MASS INDEX: 22.7 KG/M2 | DIASTOLIC BLOOD PRESSURE: 79 MMHG | HEIGHT: 68 IN | WEIGHT: 149.8 LBS | HEART RATE: 64 BPM | RESPIRATION RATE: 16 BRPM | SYSTOLIC BLOOD PRESSURE: 135 MMHG

## 2023-05-23 DIAGNOSIS — K59.00 CONSTIPATION, UNSPECIFIED CONSTIPATION TYPE: ICD-10-CM

## 2023-05-23 DIAGNOSIS — K40.90 RIGHT INGUINAL HERNIA: ICD-10-CM

## 2023-05-23 DIAGNOSIS — M62.08 RECTUS DIASTASIS: ICD-10-CM

## 2023-05-23 DIAGNOSIS — K21.9 GASTROESOPHAGEAL REFLUX DISEASE, UNSPECIFIED WHETHER ESOPHAGITIS PRESENT: Primary | ICD-10-CM

## 2023-05-23 NOTE — PROGRESS NOTES
Assessment/Plan:    GERD (gastroesophageal reflux disease)  Complaining of significant reflux for many years but worsening recently  That together with his constipation and bloating and abdominal complaints I will refer him to gastroenterology for evaluation  Rectus diastasis  He does have a rectus diastases superiorly but this does not attribute to his midline crease  He has a CT scan ordered for tomorrow and I will check on the images to see if there is any obvious abnormality  Although on exam I do not see any abdominal wall hernias that would attribute for this appearance  It may be result of shifting of his spine and twisting of his hips causing asymmetrical abdominal wall  Right inguinal hernia  He has a small asymptomatic right inguinal hernia that is easily reducible  In general would recommend consideration for repair long-term however at this point he is not interested in repair and also would like to evaluate his other symptoms prior to repair  Diagnoses and all orders for this visit:    Gastroesophageal reflux disease, unspecified whether esophagitis present  -     Ambulatory Referral to Gastroenterology; Future    Constipation, unspecified constipation type  -     Ambulatory Referral to Gastroenterology; Future    Right inguinal hernia    Rectus diastasis          Subjective:      Patient ID: Hanane Gooden is a 70 y o  male  Mr Varghese Mcdowell is a 77-year-old gentleman here for evaluation of abdominal wall abnormality  He has significant history of longstanding rheumatoid arthritis and as result has been on prednisone and narcotics long-term  He states he has had a change in his bowel habit  He feels full a lot has issues with constipation  Feels like he is going to have to vomit up his food  He states he then started to notice a crease across his mid abdomen which is new  He also has a small inguinal hernia on the right side    He did initially see a surgeon for evaluation for "hernia repair and colonoscopy  He underwent the colonoscopy but then canceled the hernia surgery  He states after the colonoscopy that the prep was too much and cleaned him out too much and he has had a long time getting back to more normal bowel habit  His main concern is his new crease across his abdomen  He also states he has been suffering for reflux for 30 years but things have increased in significance recently  He believes his brother has esophageal cancer as a result of reflux  He has never been evaluated with endoscopy before  The following portions of the patient's history were reviewed and updated as appropriate: allergies, current medications, past family history, past medical history, past social history, past surgical history and problem list     Review of Systems   Constitutional: Negative for chills and fever  HENT: Negative for ear pain and sore throat  Eyes: Negative for pain and visual disturbance  Respiratory: Negative for cough and shortness of breath  Cardiovascular: Negative for chest pain and palpitations  Gastrointestinal: Positive for abdominal pain and constipation  Negative for vomiting  Musculoskeletal: Positive for arthralgias and back pain  Skin: Negative for color change and rash  Neurological: Negative for seizures and syncope  Psychiatric/Behavioral: Negative for agitation and behavioral problems  All other systems reviewed and are negative  Objective:      /79   Pulse 64   Temp (!) 97 3 °F (36 3 °C)   Resp 16   Ht 5' 8\" (1 727 m)   Wt 67 9 kg (149 lb 12 8 oz)   BMI 22 78 kg/m²          Physical Exam  Vitals and nursing note reviewed  Constitutional:       General: He is not in acute distress  Appearance: He is well-developed  He is not diaphoretic  HENT:      Head: Normocephalic and atraumatic  Eyes:      Pupils: Pupils are equal, round, and reactive to light     Cardiovascular:      Rate and Rhythm: Normal rate and " regular rhythm  Heart sounds: Normal heart sounds  No murmur heard  Pulmonary:      Effort: Pulmonary effort is normal  No respiratory distress  Breath sounds: Normal breath sounds  No wheezing  Abdominal:      General: Bowel sounds are normal       Palpations: Abdomen is soft  Comments: Upper midline diastases  Horizontal crease at the level of umbilicus with distention inferiorly  No obvious hernia palpated in the abdomen  He does have a reducible right inguinal hernia  He does have asymmetry as of his spine resulting in uneven length of his legs and location of his hips  Musculoskeletal:      Cervical back: Normal range of motion and neck supple  Comments: Scoliosis  Arthritic changes to his joints specifically his fingers   Skin:     General: Skin is warm and dry  Neurological:      Mental Status: He is alert and oriented to person, place, and time     Psychiatric:         Behavior: Behavior normal

## 2023-05-23 NOTE — ASSESSMENT & PLAN NOTE
He has a small asymptomatic right inguinal hernia that is easily reducible  In general would recommend consideration for repair long-term however at this point he is not interested in repair and also would like to evaluate his other symptoms prior to repair

## 2023-05-23 NOTE — ASSESSMENT & PLAN NOTE
Complaining of significant reflux for many years but worsening recently  That together with his constipation and bloating and abdominal complaints I will refer him to gastroenterology for evaluation

## 2023-05-23 NOTE — ASSESSMENT & PLAN NOTE
He does have a rectus diastases superiorly but this does not attribute to his midline crease  He has a CT scan ordered for tomorrow and I will check on the images to see if there is any obvious abnormality  Although on exam I do not see any abdominal wall hernias that would attribute for this appearance  It may be result of shifting of his spine and twisting of his hips causing asymmetrical abdominal wall

## 2023-05-24 ENCOUNTER — HOSPITAL ENCOUNTER (OUTPATIENT)
Dept: RADIOLOGY | Age: 68
Discharge: HOME | End: 2023-05-24

## 2023-05-24 DIAGNOSIS — R10.30 LOWER ABDOMINAL PAIN: ICD-10-CM

## 2023-06-05 ENCOUNTER — OFFICE VISIT (OUTPATIENT)
Dept: GASTROENTEROLOGY | Facility: CLINIC | Age: 68
End: 2023-06-05
Payer: MEDICARE

## 2023-06-05 VITALS
HEIGHT: 68 IN | TEMPERATURE: 99 F | HEART RATE: 66 BPM | OXYGEN SATURATION: 91 % | BODY MASS INDEX: 23.49 KG/M2 | DIASTOLIC BLOOD PRESSURE: 75 MMHG | WEIGHT: 155 LBS | SYSTOLIC BLOOD PRESSURE: 139 MMHG

## 2023-06-05 DIAGNOSIS — K21.9 GASTROESOPHAGEAL REFLUX DISEASE, UNSPECIFIED WHETHER ESOPHAGITIS PRESENT: ICD-10-CM

## 2023-06-05 DIAGNOSIS — K59.00 CONSTIPATION, UNSPECIFIED CONSTIPATION TYPE: ICD-10-CM

## 2023-06-05 PROCEDURE — 99203 OFFICE O/P NEW LOW 30 MIN: CPT | Performed by: INTERNAL MEDICINE

## 2023-06-05 NOTE — PATIENT INSTRUCTIONS
High Fiber Diet   WHAT YOU NEED TO KNOW:   A high-fiber diet includes foods that have a high amount of fiber  Fiber is the part of fruits, vegetables, and grains that is not broken down by your body  Fiber keeps your bowel movements regular  Fiber can also help lower your cholesterol level, control blood sugar in people with diabetes, and relieve constipation  Fiber can also help you control your weight because it helps you feel full faster  Most adults should eat 25 to 35 grams of fiber each day  Talk to your dietitian or healthcare provider about the amount of fiber you need  DISCHARGE INSTRUCTIONS:   Good sources of fiber:       Foods with at least 4 grams of fiber per serving:      ? to ½ cup of high-fiber cereal (check the nutrition label on the box)    ½ cup of blackberries or raspberries    4 dried prunes    1 cooked artichoke    ½ cup of cooked legumes, such as lentils, or red, kidney, and pacheco beans    Foods with 1 to 3 grams of fiber per servin slice of whole-wheat, pumpernickel, or rye bread    ½ cup of cooked brown rice    4 whole-wheat crackers    1 cup of oatmeal    ½ cup of cereal with 1 to 3 grams of fiber per serving (check the nutrition label on the box)    1 small piece of fruit, such as an apple, banana, pear, kiwi, or orange    3 dates    ½ cup of canned apricots, fruit cocktail, peaches, or pears    ½ cup of raw or cooked vegetables, such as carrots, cauliflower, cabbage, spinach, squash, or corn  Ways that you can increase fiber in your diet:   Choose brown or wild rice instead of white rice  Use whole wheat flour in recipes instead of white or all-purpose flour  Add beans and peas to casseroles or soups  Choose fresh fruit and vegetables with peels or skins on instead of juices  Other diet guidelines to follow: Add fiber to your diet slowly  You may have abdominal discomfort, bloating, and gas if you add fiber to your diet too quickly       Drink plenty of liquids as you add fiber to your diet  You may have nausea or develop constipation if you do not drink enough water  Ask how much liquid to drink each day and which liquids are best for you  © Copyright Griselda Rosario 2022 Information is for End User's use only and may not be sold, redistributed or otherwise used for commercial purposes  The above information is an  only  It is not intended as medical advice for individual conditions or treatments  Talk to your doctor, nurse or pharmacist before following any medical regimen to see if it is safe and effective for you  Avila Mckinney  6/5/2023     Recommended Total Fiber Intake**    AGE  MEN  WOMAN    19-50  38 grams/day  25 grams/day    Over 50  30 grams/day  21 grams/day    Fiber Sources in Common Foods   Use this guide to find out if you have enough fiber in you diet      Food  Size of Serving  Fiber Grams/Servings  Calories/   Serving  Food  Size of Serving  The [id number]   Serving    Fruits: (raw unless otherwise noted  Vegetables: (cooked, unless otherwise noted)    Apple (w/peel)  1 medium  3 7  81  Artichoke  1 globe  6 5  60    Apricots  1 cup  3 7  74  Asparagus  ½ cup  1 8  25    Banana  1 medium  2 7  105  Beans:    Blackberries  1 cup  7 2  75  Green (canned)  ½ cup  1 3  14    Blueberries  1 cup  3 9  81  Kidney  ½ cup  5 7  114    Cantaloupe  1 cup  1 3  56  Lima  ½ cup  6 1  85    Grapefruit  1 medium  2 8  82  Buckley  ½ cup  7 4  118    Grapes  1 cup  1 6  114  White  ½ cup  5 5  122    Orange  1 medium  3 1  62  Beets  ½ cup  1 6  37    Pear (with peel)  1 medium  4 0  98  Broccoli  ½ cup  2 8  26    Pineapple  1 cup  1 9  76  Cabbage, green  ½ cup  2 1  16    Plums  1 medium  1 0  36  Cabbage, green (raw)  ½ cup  0 8  9    Prunes (dried)  1 cup  11 4  386  Carrots  ½ cup  2 6  35    Raspberries  1 cup  8 4  60  Cauliflower  ½ cup  2 0  17    Strawberries  1 cup  3 4  45  Cauliflower (raw)  ½ cup  1 3  13    Watermelon  1 slice  0 8  51  Celery (raw)  ½ cup  1 0  10    GRAIN PRODUCTS AND OTHERS:  Corn  ½ cup  2 0  66    Bread:  Cucumber (raw)  ½ cup  0 4  7    Swedish  1 slice  0 8  68  Eggplant  ½ cup  1 2  13    Rye  1 slice  1 6  67  Green Peas  ½ cup  4 4  62    White  1 slice  0 6  67  Lettuce, iceberg (raw)  ½ cup  0 4  4    Whole Wheat  1 slice  2 0  70  Onions (raw)  ½ cup  1 4  30    Cereal:  Potato (baked with skin)  ½ cup  1 5  66    Bran  1 ounce  9 7  70  Spinach  ½ cup  2 7  25    Corn Flakes  1 ounce  1 0  110  Tomato  ½ cup  1 0  19    Oat Bran  1 ounce  4 3  69  Zucchini  ½ cup  1 3  14    Oatmeal  1 ounce  3 0  109  METAMUCIL:    Shredded Wheat  1 ounce  2 8  102  Capsules  6 capsules  3 0  10    Crackers:  Smooth Texture Orange (sugar free)  1 tsp  3 0  20    José Miguel  1 square  0 1  27  Smooth Texture Orange (with sugar)  1 tbsp  3 0  45    Saltine  1 regular  0 1  13  Wafers  2 wafers  3 0  120    Rice:    Brown  ½ cup  1 8  108    White  ½ cup  0 3  103    Spaghetti  2 ounces  2 1  225    Almonds (roasted)  ½ cup  6 4  351    Peanuts (roasted)  ½ cup  6 1  388      ** [employer], [facility], 2002   Track your fiber intake for five days  Use the Fiber Source Guide to find out how much fiber is in common food  If you’re not getting your recommended amount of fiber each day, talk to your doctor about how you can increase the fiber in your diet  Example  Monday Tuesday Wednesday Thursday Friday    Food  Oatmeal    Fiber Grams  2 8    Food  Blueberries    Fiber Grams  3 9    Food  W W  Bread    Fiber Grams  1 9    Food  W W  Bread    Fiber Grams  1 9    Food  Apple    Fiber Grams  3 7    Food  Spaghetti    Fiber Grams    14    Food  Corn    Fiber Grams  2 0    Food  White Bread    Fiber Grams    6    Food    Fiber Grams    Food    Fiber Grams    Food    Fiber Grams    Food    Fiber Grams    Food    Fiber Grams    Food    Fiber Grams    Food    Fiber Grams    Food    Fiber Grams Food    Fiber Grams    Food    Fiber Grams    Food    Fiber Grams    Food    Fiber Grams    Add numbers in each column to find your daily fiber intake  Total Daily Fiber Intake  18 2      Too Low - Like most Americans, this example is not enough fiber  Talk to your doctor about how to add fiber to your diet  Quick Fiber Facts    Most Americans consume only about half of the recommended fiber they need each day  Fiber helps maintain normal bowel function, and helps prevent constipation and its potential complications  Straining and pressure from constipation may lead to diverticular disease and hemorrhoids  Stool softeners or stimulant laxatives only offer short-term relief of constipation, while dietary changes or fiber therapies help break the cycle of irregularity  Diets low in saturated fat and cholesterol that include 7 grams of soluble fiber per day from psyllium husk, as in Metamucil, may reduce the risk of heart disease by lowering cholesterol  One adult dose of Metamucil has 2 4 grams of this soluable fiber      Increase fiber intake gradually, giving the body time to adjust        Scheduled date of EGD (as of today) 6/27/2023  Physician performing: [facility]   Location of procedure:  [address] [place] [address]  Instructions given to patient: EGD  Clearances: N/A

## 2023-06-05 NOTE — PROGRESS NOTES
[facility] - Outpatient Consultation  Rosalina Howard 70 y o  male MRN: [id number]  Encounter: [id number]    HPI:    Rosalina Howard is a 70 y o  male with rheumatoid arthritis, currently on certolizumab pegol (previously on tocilizumab), chronic pain on opiates, presenting with complaints of early satiety and constipation  Referred by Dr Alma Funez  He has had rheumatoid arthritis for over 30 years and is currently on certolizumab  He also has chronic back pain and has been taking oxycodone for 25 years, stable dose  He is not diabetic  He reports that he started having upper GI symptoms in April consisting of early satiety which is preventing him from finishing his meals, GERD which can be severe at times, nausea associated with early satiety, and occasional dysphagia  He also feels bloated and distended  His GERD has worsened despite taking Nexium 40 mg for nearly 30 years  He has lost 15 lbs over past 2 months, which she attributes to decreased p o  intake  He has never had EGD  He also complains of constipation and difficulty getting stools out, and   He still has 4-5 bowel movements per day but they are small and hard and incomplete  He takes magnesium supplement, but no other stool softeners  Sometimes gets cramping when straining  He has seen blood in the stool with straining once or twice  He had normal colonoscopies in 2016 and 2023  He had back surgery in 2015  He also has history of right inguinal hernia repair and 2 umbilical hernia repairs  He has rectus diastases  One of his concerns is that he has noticed a change in the shape of his abdomen and feels that it has become more concave recently, as if there is a band pulling it in; he is worried that this may be contributing to his GI symptoms, and/or that it may be related to his prior surgeries  Recent CBC from May normal, except for elevated MCV    ESR and CRP normal   LFTs and creatinine normal  CT of the abdomen and pelvis from May 24 reviewed, no abdominal abnormality  REVIEW OF SYSTEMS:  CONSTITUTIONAL: Denies any fever, chills, rigors, and weight loss  HEENT: No earache or tinnitus  Denies hearing loss or visual disturbances  CARDIOVASCULAR: No chest pain or palpitations  RESPIRATORY: Denies any cough, hemoptysis, shortness of breath or dyspnea on exertion  GASTROINTESTINAL: As noted in the History of Present Illness  GENITOURINARY: No problems with urination  Denies any hematuria or dysuria  NEUROLOGIC: No dizziness or vertigo, denies headaches  MUSCULOSKELETAL: Denies any muscle or joint pain  SKIN: Denies skin rashes or itching  ENDOCRINE: Denies excessive thirst  Denies intolerance to heat or cold  PSYCHOSOCIAL: Denies depression or anxiety  Denies any recent memory loss       Historical Information   Past Medical History:   Diagnosis Date   • Acute right lumbar radiculopathy    • Allergic rhinitis    • Anxiety    • Arthritis    • Chronic pain disorder    • CPAP (continuous positive airway pressure) dependence     Bi-Pap machine   • Depression    • Dislocation     Right hip prone to dislocations with sudden movement   • GERD (gastroesophageal reflux disease)    • Hyperlipidemia    • Insomnia    • PAF (paroxysmal atrial fibrillation) ([facility])    • Pain in both lower extremities    • Pain in left shoulder    • RA (rheumatoid arthritis) ([facility])    • Restless leg    • Rheumatoid arthritis ([facility] [address] )     last assessed 07/29/16   • Seasonal allergies    • Sleep apnea     partial per pt     Past Surgical History:   Procedure Laterality Date   • BACK SURGERY      pradeep MARTI 1-2-3-4   • COLONOSCOPY     • ELBOW SURGERY Right     tendon repair   • FINGER SURGERY Right 12/29/2021    R 4th finger   • FOOT SURGERY Bilateral    • INGUINAL HERNIA REPAIR     • JOINT REPLACEMENT Bilateral     both hips twice   • KNEE SURGERY     • NERVE BLOCK Right     peripheral nerve block wrist radial   • PALATOPHARYNGOPLASTY • IL AMPUTATION TOE METATARSOPHALANGEAL JOINT Right 4/12/2022    Procedure: 2nd toe amputation at MTPJ;  Surgeon: Daron Donaldson DPM;  [facility]: [facility];  Service: Podiatry   • IL CORRECTION CARRILLOERTOE Right 1/25/2021    Procedure: 2ND MPJ RELEASE;  Surgeon: Shagufta Calhoun DPM;  [facility];  Service: Podiatry   • Piroska U  97  W/SESMDC W/DIST Feliciavictor m Mcneal Right 1/25/2021    Procedure: Deyossi Shiloh;  Surgeon: Shagufta Calhoun DPM;  [facility];  Service: Podiatry   • IL STAB PHLEBT VARICOSE VEINS 1 XTR 10-20 STAB INCS Left 12/14/2018    Procedure: LOWER EXTREMITY MICRO PHLEBECTOMIES;  Surgeon: Quyen Katz DO;  [facility];  Service: Vascular   • SHOULDER SURGERY Left     dislocation repair of muscle   • SINUS SURGERY     • TONSILLECTOMY AND ADENOIDECTOMY     • UMBILICAL HERNIA REPAIR       Social History   Social History     Substance and Sexual Activity   Alcohol Use Not Currently    Comment: socially;      Social History     Substance and Sexual Activity   Drug Use Yes   • Types: Marijuana    Comment: CBD tincture HS     Social History     Tobacco Use   Smoking Status Never   Smokeless Tobacco Never     Family History   Problem Relation Age of Onset   • Heart disease Mother    • Heart failure Mother    • Transient ischemic attack Father    • Gout Brother    • Stroke Family         CVA       Meds/Allergies       Current Outpatient Medications:   •  ascorbic acid (VITAMIN C) 500 mg tablet  •  aspirin (ECOTRIN LOW STRENGTH) 81 mg EC tablet  •  atorvastatin (LIPITOR) 40 mg tablet  •  B-COMPLEX-C PO  •  Boswellia Sally (BOSWELLIA PO)  •  celecoxib (CeleBREX) 100 mg capsule  •  Certolizumab Pegol (CIMZIA SC)  •  Cholecalciferol (VITAMIN D3) 3000 units TABS  •  Coenzyme Q10 (CO Q 10) 100 MG CAPS  •  cyanocobalamin (VITAMIN B-12) 100 mcg tablet  •  diphenhydrAMINE (BENADRYL) 25 mg tablet  •  esomeprazole (NexIUM) 40 MG capsule  •  gabapentin (NEURONTIN) 600 MG "tablet  •  glucosamine-chondroitin 500-400 MG tablet  •  MAGNESIUM PO  •  metoprolol succinate (Toprol XL) 50 mg 24 hr tablet  •  milk thistle 175 MG tablet  •  minocycline (DYNACIN) 50 MG tablet  •  Multiple Vitamin (multivitamin) tablet  •  OIL OF OREGANO PO  •  Omega-3 Fatty Acids (FISH OIL PO)  •  orphenadrine (NORFLEX) 100 mg tablet  •  oxyCODONE (ROXICODONE) 10 MG TABS  •  pramipexole (MIRAPEX) 0 25 mg tablet  •  sildenafil (VIAGRA) 100 mg tablet  •  TURMERIC CURCUMIN PO  •  Zinc Sulfate (ZINC 15 PO)  •  zolpidem (AMBIEN) 10 mg tablet  •  Acetaminophen (Tylenol) 325 MG CAPS  •  chlorhexidine (PERIDEX) 0 12 % solution  •  itraconazole (SPORANOX) 100 mg capsule  •  predniSONE 1 mg tablet  •  predniSONE 5 mg tablet  •  Tocilizumab (ACTEMRA IV)  •  triamcinolone (KENALOG) 0 1 % ointment    Allergies   Allergen Reactions   • Penicillins Anaphylaxis     Category: Allergy;    • Morphine Nausea Only       Objective   Blood pressure 139/75, pulse 66, temperature 99 °F (37 2 °C), temperature source Tympanic, height 5' 8\" (1 727 m), weight 70 3 kg (155 lb), SpO2 91 %  Body mass index is 23 57 kg/m²  PHYSICAL EXAM:    General Appearance:   Alert, cooperative, no distress   HEENT:   Normocephalic, atraumatic, anicteric  Neck:  Supple, symmetrical, trachea midline   Lungs:   Clear to auscultation bilaterally; no rales, rhonchi or wheezing; respirations unlabored    Heart[de-identified]   Regular rate and rhythm; no murmur, rub, or gallop  Abdomen:   Soft, diastasis of rectus, bulging of rib cage,  non-distended; normal bowel sounds; no masses, no organomegaly    Genitalia:   Deferred    Rectal:   Deferred    Extremities:  No cyanosis, clubbing or edema    Pulses:  2+ and symmetric    Skin:  No jaundice, rashes, or lesions    Lymph nodes:  No palpable cervical lymphadenopathy        Lab Results:   No visits with results within 1 Day(s) from this visit     Latest known visit with results is:   Admission on 04/12/2022, Discharged on " "04/12/2022   Component Date Value   • Case Report 04/12/2022                      Value:Surgical Pathology Report                         Case: [id number]                                   Authorizing Provider:  Adam Oviedo          Collected:           04/12/2022 1106              Ordering Location:     [facility]        Received:            04/12/2022 [address] [facility]                                                     Pathologist:           Winsome Arceo MD                                                    Specimen:    Toe, Left, left second toe                                                                • Final Diagnosis 04/12/2022                      Value: This result contains rich text formatting which cannot be displayed here  • Additional Information 04/12/2022                      Value: This result contains rich text formatting which cannot be displayed here  • Gross Description 04/12/2022                      Value: This result contains rich text formatting which cannot be displayed here         Lab Results   Component Value Date    HCT 46 5 12/03/2018    HGB 15 6 12/03/2018     (H) 12/03/2018     12/03/2018    WBC 6 54 12/03/2018       Lab Results   Component Value Date    AGAP 7 12/03/2018    ALKPHOS 69 12/03/2018    ALT 40 12/03/2018    ANIONGAP 7 06/17/2015    AST 23 12/03/2018    BILITOT 0 4 06/03/2015    BUN 18 12/03/2018    CALCIUM 9 2 12/03/2018     12/03/2018    CO2 29 12/03/2018    CREATININE 0 75 12/03/2018    EGFR 97 12/03/2018    GLUC 107 10/04/2018    GLUF 100 (H) 12/03/2018    K 4 7 12/03/2018     06/17/2015    PROT 7 1 06/03/2015    SODIUM 137 12/03/2018    TBILI 0 56 12/03/2018    TP 7 1 12/03/2018       Lab Results   Component Value Date    CRP <3 0 12/03/2018       No results found for: \"TSH\", \"[id number]\"    No results found for: \"FERRITIN\", \"IRON\", \"TIBC\"    Radiology Results: " CT abdomen pelvis without contrast    Result Date: 5/27/2023  Narrative: CT ABDOMEN AND PELVIS WITHOUT IV CONTRAST INDICATION:   R10 30: Lower abdominal pain, unspecified  COMPARISON:  None  TECHNIQUE:  CT examination of the abdomen and pelvis was performed without intravenous contrast  Multiplanar 2D reformatted images were created from the source data  This examination, like all CT scans performed in the [facility], was performed utilizing techniques to minimize radiation dose exposure, including the use of iterative reconstruction and automated exposure control  Radiation dose length product (DLP) for this visit:  517 mGy-cm Enteric contrast was administered  FINDINGS: ABDOMEN LOWER CHEST:  No clinically significant abnormality identified in the visualized lower chest  LIVER/BILIARY TREE:  One or more subcentimeter sharply circumscribed low-density hepatic lesion(s) are noted, too small to accurately characterize, but statistically most likely to represent subcentimeter hepatic cysts  No suspicious solid hepatic lesion is identified  Hepatic contours are normal   No biliary dilatation  GALLBLADDER:  No calcified gallstones  No pericholecystic inflammatory change  SPLEEN:  Unremarkable  PANCREAS:  Unremarkable  ADRENAL GLANDS:  Unremarkable  KIDNEYS/URETERS:  Unremarkable  No hydronephrosis  STOMACH AND BOWEL:  Unremarkable  APPENDIX:  No findings to suggest appendicitis  ABDOMINOPELVIC CAVITY:  No ascites  No pneumoperitoneum  No lymphadenopathy  VESSELS:  Unremarkable for patient's age  PELVIS REPRODUCTIVE ORGANS:  Obscured by streak artifact from bilateral hip arthroplasty    URINARY BLADDER: Obscured by streak artifact from bilateral hip arthroplasty    ABDOMINAL WALL/INGUINAL REGIONS:  Right inguinal hernia containing fat and bowel  OSSEOUS STRUCTURES:  No acute fracture or destructive osseous lesion  Levoscoliosis  Bilateral hip arthroplasty   Superior endplate compression fracture at the T12 vertebral body with approximately 20% loss of vertebral body height anteriorly  Impression: Superior endplate compression fracture at the T12 vertebral body with approximately 20% loss of vertebral body height anteriorly  Otherwise no evidence of acute intra-abdominal or pelvic pathology Workstation performed: [id number]     Colonoscopy    Result Date: 5/16/2023  Narrative: Table formatting from the original result was not included  Keith Figueroa [facility] [address] [place] [address] [phone] DATE OF SERVICE: 5/16/23 PHYSICIAN(S): Attending: Warden Romie DO Fellow: No Staff Documented INDICATION: Encounter for screening colonoscopy POST-OP DIAGNOSIS: See the impression below  HISTORY: Prior colonoscopy: 10 years ago  BOWEL PREPARATION: Miralax/Dulcolax PREPROCEDURE: Informed consent was obtained for the procedure, including sedation  Risks including but not limited to bleeding, infection, perforation, adverse drug reaction and aspiration were explained in detail  Also explained about less than 100% sensitivity with the exam and other alternatives  The patient was placed in the left lateral decubitus position  Procedure: Colonoscopy DETAILS OF PROCEDURE: Patient was taken to the procedure room where a time out was performed to confirm correct patient and correct procedure  The patient underwent monitored anesthesia care, which was administered by an anesthesia professional  The patient's blood pressure, heart rate, level of consciousness, oxygen and respirations were monitored throughout the procedure  A digital rectal exam was performed  The scope was introduced through the anus and advanced to the cecum  Retroflexion was performed in the rectum  The quality of bowel preparation was evaluated using the [facility] Bowel Preparation Scale with scores of: right colon = 2, transverse colon = 2, left colon = 2  The total BBPS score was 6  Bowel prep was adequate  The patient experienced no blood loss  The procedure was moderately difficult due to angulation and loops in the digestive tract  In response to procedure difficulty, counter pressure was applied  The patient tolerated the procedure well  There were no apparent complications  ANESTHESIA INFORMATION: ASA: II Anesthesia Type: IV Sedation with Anesthesia MEDICATIONS: No administrations occurring from 1305 to 1322 on 05/16/23 FINDINGS: All observed locations appeared normal, including the ileocecal valve and entire colon  EVENTS: Procedure Events Event Event Time ENDO CECUM REACHED 5/16/2023  1:16 PM ENDO SCOPE OUT TIME 5/16/2023  1:22 PM SPECIMENS: * No specimens in log * EQUIPMENT: Colonoscope -[id number]     Impression: Normal colonoscopy  RECOMMENDATION:  Repeat screening colonoscopy in 10 years  Recommend high-fiber diet  Daily fiber supplements with plenty of fluids  Belia Watson DO       ______________________________________________________________________  ASSESSMENT AND PLAN:    Alma Khalil is a 70 y o  male with longstanding rheumatoid arthritis, currently on anti-TNF therapy, chronic pain on long-term opioids, now presenting with early satiety, worsening GERD, and constipation  I suspect that his chronic opioid use is contributing to these issues, but he has been on a stable dose for many years  The fact that he has developed new upper GI symptoms, consisting of refractory GERD, early satiety, nausea warrants EGD to rule out gastric outlet obstruction, malignancy, peptic ulcer disease  He has also lost weight which is of concern  He also reports worsening constipation but had normal colonoscopy this year, therefore we will focus on symptomatic management  His constipation may also be contributing to his upper GI issues  He is very concerned that laxatives and stool softeners in the past have led him to have incontinence      We also discussed his concerns about the abnormal shape of his abdomen and rectus diastases  I did not appreciate abdominal pathology on his CAT scan and do not think that he needs surgical intervention  Moreover, he already saw Dr Jenny Serrano  We could consider a small bowel follow-through in the future if we are worried about adhesive disease from prior surgeries  1   We are going to schedule EGD  2   I would like him to increase daily fiber intake to 30 g  I think that this will help with better stool formation and more regular stools  Were going to do this before trying any laxatives  Once he has reached his target, we can consider adding low-dose MiraLAX if he is still constipated  3   If his EGD is normal and he continues to have symptoms of early satiety, we can consider gastric emptying scan  However, I feel that this study will be of limited use because he is already on opioids  Therefore, we could try empiric low-dose metoclopramide  I will discuss these considerations with him at the next visit  4   Continue daily PPI  Return in 3 months  1  Gastroesophageal reflux disease, unspecified whether esophagitis present    2   Constipation, unspecified constipation type        Orders Placed This Encounter   Procedures   • EGD

## 2023-06-05 NOTE — H&P (VIEW-ONLY)
[facility] - Outpatient Consultation  Celso Silveira 70 y o  male MRN: [id number]  Encounter: [id number]    HPI:    Celso Silveira is a 70 y o  male with rheumatoid arthritis, currently on certolizumab pegol (previously on tocilizumab), chronic pain on opiates, presenting with complaints of early satiety and constipation  Referred by Dr Ayla Rodriguez  He has had rheumatoid arthritis for over 30 years and is currently on certolizumab  He also has chronic back pain and has been taking oxycodone for 25 years, stable dose  He is not diabetic  He reports that he started having upper GI symptoms in April consisting of early satiety which is preventing him from finishing his meals, GERD which can be severe at times, nausea associated with early satiety, and occasional dysphagia  He also feels bloated and distended  His GERD has worsened despite taking Nexium 40 mg for nearly 30 years  He has lost 15 lbs over past 2 months, which she attributes to decreased p o  intake  He has never had EGD  He also complains of constipation and difficulty getting stools out, and   He still has 4-5 bowel movements per day but they are small and hard and incomplete  He takes magnesium supplement, but no other stool softeners  Sometimes gets cramping when straining  He has seen blood in the stool with straining once or twice  He had normal colonoscopies in 2016 and 2023  He had back surgery in 2015  He also has history of right inguinal hernia repair and 2 umbilical hernia repairs  He has rectus diastases  One of his concerns is that he has noticed a change in the shape of his abdomen and feels that it has become more concave recently, as if there is a band pulling it in; he is worried that this may be contributing to his GI symptoms, and/or that it may be related to his prior surgeries  Recent CBC from May normal, except for elevated MCV    ESR and CRP normal   LFTs and creatinine normal  CT of the abdomen and pelvis from May 24 reviewed, no abdominal abnormality  REVIEW OF SYSTEMS:  CONSTITUTIONAL: Denies any fever, chills, rigors, and weight loss  HEENT: No earache or tinnitus  Denies hearing loss or visual disturbances  CARDIOVASCULAR: No chest pain or palpitations  RESPIRATORY: Denies any cough, hemoptysis, shortness of breath or dyspnea on exertion  GASTROINTESTINAL: As noted in the History of Present Illness  GENITOURINARY: No problems with urination  Denies any hematuria or dysuria  NEUROLOGIC: No dizziness or vertigo, denies headaches  MUSCULOSKELETAL: Denies any muscle or joint pain  SKIN: Denies skin rashes or itching  ENDOCRINE: Denies excessive thirst  Denies intolerance to heat or cold  PSYCHOSOCIAL: Denies depression or anxiety  Denies any recent memory loss       Historical Information   Past Medical History:   Diagnosis Date   • Acute right lumbar radiculopathy    • Allergic rhinitis    • Anxiety    • Arthritis    • Chronic pain disorder    • CPAP (continuous positive airway pressure) dependence     Bi-Pap machine   • Depression    • Dislocation     Right hip prone to dislocations with sudden movement   • GERD (gastroesophageal reflux disease)    • Hyperlipidemia    • Insomnia    • PAF (paroxysmal atrial fibrillation) ([facility])    • Pain in both lower extremities    • Pain in left shoulder    • RA (rheumatoid arthritis) ([facility])    • Restless leg    • Rheumatoid arthritis ([facility] [address] )     last assessed 07/29/16   • Seasonal allergies    • Sleep apnea     partial per pt     Past Surgical History:   Procedure Laterality Date   • BACK SURGERY      pradeep MARTI 1-2-3-4   • COLONOSCOPY     • ELBOW SURGERY Right     tendon repair   • FINGER SURGERY Right 12/29/2021    R 4th finger   • FOOT SURGERY Bilateral    • INGUINAL HERNIA REPAIR     • JOINT REPLACEMENT Bilateral     both hips twice   • KNEE SURGERY     • NERVE BLOCK Right     peripheral nerve block wrist radial   • PALATOPHARYNGOPLASTY • MO AMPUTATION TOE METATARSOPHALANGEAL JOINT Right 4/12/2022    Procedure: 2nd toe amputation at MTPJ;  Surgeon: Amita Hamilton DPM;  [facility];  Service: Podiatry   • MO CORRECTION HAMMERTOE Right 1/25/2021    Procedure: 2ND MPJ RELEASE;  Surgeon: Charlene Pierce DPM;  [facility];  Service: Podiatry   • Piroska U  97  W/SESMDC W/DIST Shiloh Carbon Right 1/25/2021    Procedure: Alli Majestic;  Surgeon: Charlene Pierce DPM;  [facility];  Service: Podiatry   • MO STAB PHLEBT VARICOSE VEINS 1 XTR 10-20 STAB INCS Left 12/14/2018    Procedure: LOWER EXTREMITY MICRO PHLEBECTOMIES;  Surgeon: Abby Orozco DO;  [facility];  Service: Vascular   • SHOULDER SURGERY Left     dislocation repair of muscle   • SINUS SURGERY     • TONSILLECTOMY AND ADENOIDECTOMY     • UMBILICAL HERNIA REPAIR       Social History   Social History     Substance and Sexual Activity   Alcohol Use Not Currently    Comment: socially;      Social History     Substance and Sexual Activity   Drug Use Yes   • Types: Marijuana    Comment: CBD tincture HS     Social History     Tobacco Use   Smoking Status Never   Smokeless Tobacco Never     Family History   Problem Relation Age of Onset   • Heart disease Mother    • Heart failure Mother    • Transient ischemic attack Father    • Gout Brother    • Stroke Family         CVA       Meds/Allergies       Current Outpatient Medications:   •  ascorbic acid (VITAMIN C) 500 mg tablet  •  aspirin (ECOTRIN LOW STRENGTH) 81 mg EC tablet  •  atorvastatin (LIPITOR) 40 mg tablet  •  B-COMPLEX-C PO  •  Boswellia Sally (BOSWELLIA PO)  •  celecoxib (CeleBREX) 100 mg capsule  •  Certolizumab Pegol (CIMZIA SC)  •  Cholecalciferol (VITAMIN D3) 3000 units TABS  •  Coenzyme Q10 (CO Q 10) 100 MG CAPS  •  cyanocobalamin (VITAMIN B-12) 100 mcg tablet  •  diphenhydrAMINE (BENADRYL) 25 mg tablet  •  esomeprazole (NexIUM) 40 MG capsule  •  gabapentin (NEURONTIN) 600 MG "tablet  •  glucosamine-chondroitin 500-400 MG tablet  •  MAGNESIUM PO  •  metoprolol succinate (Toprol XL) 50 mg 24 hr tablet  •  milk thistle 175 MG tablet  •  minocycline (DYNACIN) 50 MG tablet  •  Multiple Vitamin (multivitamin) tablet  •  OIL OF OREGANO PO  •  Omega-3 Fatty Acids (FISH OIL PO)  •  orphenadrine (NORFLEX) 100 mg tablet  •  oxyCODONE (ROXICODONE) 10 MG TABS  •  pramipexole (MIRAPEX) 0 25 mg tablet  •  sildenafil (VIAGRA) 100 mg tablet  •  TURMERIC CURCUMIN PO  •  Zinc Sulfate (ZINC 15 PO)  •  zolpidem (AMBIEN) 10 mg tablet  •  Acetaminophen (Tylenol) 325 MG CAPS  •  chlorhexidine (PERIDEX) 0 12 % solution  •  itraconazole (SPORANOX) 100 mg capsule  •  predniSONE 1 mg tablet  •  predniSONE 5 mg tablet  •  Tocilizumab (ACTEMRA IV)  •  triamcinolone (KENALOG) 0 1 % ointment    Allergies   Allergen Reactions   • Penicillins Anaphylaxis     Category: Allergy;    • Morphine Nausea Only       Objective   Blood pressure 139/75, pulse 66, temperature 99 °F (37 2 °C), temperature source Tympanic, height 5' 8\" (1 727 m), weight 70 3 kg (155 lb), SpO2 91 %  Body mass index is 23 57 kg/m²  PHYSICAL EXAM:    General Appearance:   Alert, cooperative, no distress   HEENT:   Normocephalic, atraumatic, anicteric  Neck:  Supple, symmetrical, trachea midline   Lungs:   Clear to auscultation bilaterally; no rales, rhonchi or wheezing; respirations unlabored    Heart[de-identified]   Regular rate and rhythm; no murmur, rub, or gallop  Abdomen:   Soft, diastasis of rectus, bulging of rib cage,  non-distended; normal bowel sounds; no masses, no organomegaly    Genitalia:   Deferred    Rectal:   Deferred    Extremities:  No cyanosis, clubbing or edema    Pulses:  2+ and symmetric    Skin:  No jaundice, rashes, or lesions    Lymph nodes:  No palpable cervical lymphadenopathy        Lab Results:   No visits with results within 1 Day(s) from this visit     Latest known visit with results is:   Admission on 04/12/2022, Discharged on " "04/12/2022   Component Date Value   • Case Report 04/12/2022                      Value:Surgical Pathology Report                         Case: [id number]                                   Authorizing Provider:  [facility]          Collected:           04/12/2022 1106              [facility]        Received:            04/12/2022 [address]                                                     Pathologist:           Rayray Gerber MD                                                    Specimen:    Toe, Left, left second toe                                                                • Final Diagnosis 04/12/2022                      Value: This result contains rich text formatting which cannot be displayed here  • Additional Information 04/12/2022                      Value: This result contains rich text formatting which cannot be displayed here  • Gross Description 04/12/2022                      Value: This result contains rich text formatting which cannot be displayed here         Lab Results   Component Value Date    HCT 46 5 12/03/2018    HGB 15 6 12/03/2018     (H) 12/03/2018     12/03/2018    WBC 6 54 12/03/2018       Lab Results   Component Value Date    AGAP 7 12/03/2018    ALKPHOS 69 12/03/2018    ALT 40 12/03/2018    ANIONGAP 7 06/17/2015    AST 23 12/03/2018    BILITOT 0 4 06/03/2015    BUN 18 12/03/2018    CALCIUM 9 2 12/03/2018     12/03/2018    CO2 29 12/03/2018    CREATININE 0 75 12/03/2018    EGFR 97 12/03/2018    GLUC 107 10/04/2018    GLUF 100 (H) 12/03/2018    K 4 7 12/03/2018     06/17/2015    PROT 7 1 06/03/2015    SODIUM 137 12/03/2018    TBILI 0 56 12/03/2018    TP 7 1 12/03/2018       Lab Results   Component Value Date    CRP <3 0 12/03/2018       No results found for: \"TSH\", \"ZWD1WEXOTCXO\"    No results found for: \"FERRITIN\", \"IRON\", \"TIBC\"    Radiology Results: " CT abdomen pelvis without contrast    Result Date: 5/27/2023  Narrative: CT ABDOMEN AND PELVIS WITHOUT IV CONTRAST INDICATION:   R10 30: Lower abdominal pain, unspecified  COMPARISON:  None  TECHNIQUE:  CT examination of the abdomen and pelvis was performed without intravenous contrast  Multiplanar 2D reformatted images were created from the source data  This examination, like all CT scans performed in the [facility], was performed utilizing techniques to minimize radiation dose exposure, including the use of iterative reconstruction and automated exposure control  Radiation dose length product (DLP) for this visit:  517 mGy-cm Enteric contrast was administered  FINDINGS: ABDOMEN LOWER CHEST:  No clinically significant abnormality identified in the visualized lower chest  LIVER/BILIARY TREE:  One or more subcentimeter sharply circumscribed low-density hepatic lesion(s) are noted, too small to accurately characterize, but statistically most likely to represent subcentimeter hepatic cysts  No suspicious solid hepatic lesion is identified  Hepatic contours are normal   No biliary dilatation  GALLBLADDER:  No calcified gallstones  No pericholecystic inflammatory change  SPLEEN:  Unremarkable  PANCREAS:  Unremarkable  ADRENAL GLANDS:  Unremarkable  KIDNEYS/URETERS:  Unremarkable  No hydronephrosis  STOMACH AND BOWEL:  Unremarkable  APPENDIX:  No findings to suggest appendicitis  ABDOMINOPELVIC CAVITY:  No ascites  No pneumoperitoneum  No lymphadenopathy  VESSELS:  Unremarkable for patient's age  PELVIS REPRODUCTIVE ORGANS:  Obscured by streak artifact from bilateral hip arthroplasty    URINARY BLADDER: Obscured by streak artifact from bilateral hip arthroplasty    ABDOMINAL WALL/INGUINAL REGIONS:  Right inguinal hernia containing fat and bowel  OSSEOUS STRUCTURES:  No acute fracture or destructive osseous lesion  Levoscoliosis  Bilateral hip arthroplasty   Superior endplate compression fracture at the T12 vertebral body with approximately 20% loss of vertebral body height anteriorly  Impression: Superior endplate compression fracture at the T12 vertebral body with approximately 20% loss of vertebral body height anteriorly  Otherwise no evidence of acute intra-abdominal or pelvic pathology Workstation performed: [id number]     Colonoscopy    Result Date: 5/16/2023  Narrative: Table formatting from the original result was not included  Keith Vasquez [address] [facility] [phone] DATE OF SERVICE: 5/16/23 PHYSICIAN(S): Attending: Delvin Hickman DO Fellow: No Staff Documented INDICATION: Encounter for screening colonoscopy POST-OP DIAGNOSIS: See the impression below  HISTORY: Prior colonoscopy: 10 years ago  BOWEL PREPARATION: Miralax/Dulcolax PREPROCEDURE: Informed consent was obtained for the procedure, including sedation  Risks including but not limited to bleeding, infection, perforation, adverse drug reaction and aspiration were explained in detail  Also explained about less than 100% sensitivity with the exam and other alternatives  The patient was placed in the left lateral decubitus position  Procedure: Colonoscopy DETAILS OF PROCEDURE: Patient was taken to the procedure room where a time out was performed to confirm correct patient and correct procedure  The patient underwent monitored anesthesia care, which was administered by an anesthesia professional  The patient's blood pressure, heart rate, level of consciousness, oxygen and respirations were monitored throughout the procedure  A digital rectal exam was performed  The scope was introduced through the anus and advanced to the cecum  Retroflexion was performed in the rectum  The quality of bowel preparation was evaluated using the [facility] Bowel Preparation Scale with scores of: right colon = 2, transverse colon = 2, left colon = 2  The total BBPS score was 6  Bowel prep was adequate  The patient experienced no blood loss  The procedure was moderately difficult due to angulation and loops in the digestive tract  In response to procedure difficulty, counter pressure was applied  The patient tolerated the procedure well  There were no apparent complications  ANESTHESIA INFORMATION: ASA: II Anesthesia Type: IV Sedation with Anesthesia MEDICATIONS: No administrations occurring from 1305 to 1322 on 05/16/23 FINDINGS: All observed locations appeared normal, including the ileocecal valve and entire colon  EVENTS: Procedure Events Event Event Time ENDO CECUM REACHED 5/16/2023  1:16 PM ENDO SCOPE OUT TIME 5/16/2023  1:22 PM SPECIMENS: * No specimens in log * EQUIPMENT: Colonoscope -[id number]     Impression: Normal colonoscopy  RECOMMENDATION:  Repeat screening colonoscopy in 10 years  Recommend high-fiber diet  Daily fiber supplements with plenty of fluids  Hieu Watson,        ______________________________________________________________________  ASSESSMENT AND PLAN:    Denise Keller is a 70 y o  male with longstanding rheumatoid arthritis, currently on anti-TNF therapy, chronic pain on long-term opioids, now presenting with early satiety, worsening GERD, and constipation  I suspect that his chronic opioid use is contributing to these issues, but he has been on a stable dose for many years  The fact that he has developed new upper GI symptoms, consisting of refractory GERD, early satiety, nausea warrants EGD to rule out gastric outlet obstruction, malignancy, peptic ulcer disease  He has also lost weight which is of concern  He also reports worsening constipation but had normal colonoscopy this year, therefore we will focus on symptomatic management  His constipation may also be contributing to his upper GI issues  He is very concerned that laxatives and stool softeners in the past have led him to have incontinence      We also discussed his concerns about the abnormal shape of his abdomen and rectus diastases  I did not appreciate abdominal pathology on his CAT scan and do not think that he needs surgical intervention  Moreover, he already saw Dr Solo Gaytan  We could consider a small bowel follow-through in the future if we are worried about adhesive disease from prior surgeries  1   We are going to schedule EGD  2   I would like him to increase daily fiber intake to 30 g  I think that this will help with better stool formation and more regular stools  Were going to do this before trying any laxatives  Once he has reached his target, we can consider adding low-dose MiraLAX if he is still constipated  3   If his EGD is normal and he continues to have symptoms of early satiety, we can consider gastric emptying scan  However, I feel that this study will be of limited use because he is already on opioids  Therefore, we could try empiric low-dose metoclopramide  I will discuss these considerations with him at the next visit  4   Continue daily PPI  Return in 3 months  1  Gastroesophageal reflux disease, unspecified whether esophagitis present    2   Constipation, unspecified constipation type        Orders Placed This Encounter   Procedures   • EGD

## 2023-06-27 ENCOUNTER — HOSPITAL ENCOUNTER (OUTPATIENT)
Dept: GASTROENTEROLOGY | Facility: HOSPITAL | Age: 68
Setting detail: OUTPATIENT SURGERY
Discharge: HOME | End: 2023-06-27
Attending: INTERNAL MEDICINE | Admitting: INTERNAL MEDICINE
Payer: MEDICARE

## 2023-06-27 ENCOUNTER — ANESTHESIA EVENT (OUTPATIENT)
Dept: GASTROENTEROLOGY | Facility: HOSPITAL | Age: 68
End: 2023-06-27

## 2023-06-27 ENCOUNTER — ANESTHESIA (OUTPATIENT)
Dept: GASTROENTEROLOGY | Facility: HOSPITAL | Age: 68
End: 2023-06-27

## 2023-06-27 VITALS
HEIGHT: 68 IN | RESPIRATION RATE: 18 BRPM | WEIGHT: 163 LBS | HEART RATE: 61 BPM | SYSTOLIC BLOOD PRESSURE: 106 MMHG | DIASTOLIC BLOOD PRESSURE: 74 MMHG | BODY MASS INDEX: 24.71 KG/M2 | OXYGEN SATURATION: 100 % | TEMPERATURE: 99 F

## 2023-06-27 DIAGNOSIS — K21.9 GASTROESOPHAGEAL REFLUX DISEASE, UNSPECIFIED WHETHER ESOPHAGITIS PRESENT: ICD-10-CM

## 2023-06-27 PROCEDURE — 88305 TISSUE EXAM BY PATHOLOGIST: CPT | Performed by: STUDENT IN AN ORGANIZED HEALTH CARE EDUCATION/TRAINING PROGRAM

## 2023-06-27 RX ORDER — CELECOXIB 200 MG/1
CAPSULE ORAL
COMMUNITY
Start: 2023-04-15

## 2023-06-27 RX ORDER — PROPOFOL 10 MG/ML
INJECTION, EMULSION INTRAVENOUS AS NEEDED
Status: DISCONTINUED | OUTPATIENT
Start: 2023-06-27 | End: 2023-06-27

## 2023-06-27 RX ORDER — LIDOCAINE HYDROCHLORIDE 10 MG/ML
INJECTION, SOLUTION EPIDURAL; INFILTRATION; INTRACAUDAL; PERINEURAL AS NEEDED
Status: DISCONTINUED | OUTPATIENT
Start: 2023-06-27 | End: 2023-06-27

## 2023-06-27 RX ORDER — EPHEDRINE SULFATE 50 MG/ML
INJECTION INTRAVENOUS AS NEEDED
Status: DISCONTINUED | OUTPATIENT
Start: 2023-06-27 | End: 2023-06-27

## 2023-06-27 RX ORDER — SODIUM CHLORIDE, SODIUM LACTATE, POTASSIUM CHLORIDE, CALCIUM CHLORIDE 600; 310; 30; 20 MG/100ML; MG/100ML; MG/100ML; MG/100ML
INJECTION, SOLUTION INTRAVENOUS CONTINUOUS PRN
Status: DISCONTINUED | OUTPATIENT
Start: 2023-06-27 | End: 2023-06-27

## 2023-06-27 RX ORDER — ASCORBIC ACID 500 MG
50 TABLET ORAL
COMMUNITY
End: 2023-06-27 | Stop reason: DRUGHIGH

## 2023-06-27 RX ORDER — PREDNISONE 10 MG/1
TABLET ORAL
COMMUNITY
Start: 2023-05-20

## 2023-06-27 RX ORDER — SODIUM CHLORIDE, SODIUM LACTATE, POTASSIUM CHLORIDE, CALCIUM CHLORIDE 600; 310; 30; 20 MG/100ML; MG/100ML; MG/100ML; MG/100ML
100 INJECTION, SOLUTION INTRAVENOUS CONTINUOUS
Status: CANCELLED | OUTPATIENT
Start: 2023-06-27

## 2023-06-27 RX ADMIN — SODIUM CHLORIDE, SODIUM LACTATE, POTASSIUM CHLORIDE, AND CALCIUM CHLORIDE: .6; .31; .03; .02 INJECTION, SOLUTION INTRAVENOUS at 10:26

## 2023-06-27 RX ADMIN — PROPOFOL 150 MG: 10 INJECTION, EMULSION INTRAVENOUS at 10:32

## 2023-06-27 RX ADMIN — PROPOFOL 20 MG: 10 INJECTION, EMULSION INTRAVENOUS at 10:36

## 2023-06-27 RX ADMIN — LIDOCAINE HYDROCHLORIDE 50 MG: 10 INJECTION, SOLUTION EPIDURAL; INFILTRATION; INTRACAUDAL; PERINEURAL at 10:32

## 2023-06-27 RX ADMIN — PROPOFOL 20 MG: 10 INJECTION, EMULSION INTRAVENOUS at 10:35

## 2023-06-27 RX ADMIN — EPHEDRINE SULFATE 10 MG: 50 INJECTION INTRAVENOUS at 10:40

## 2023-06-27 RX ADMIN — PROPOFOL 20 MG: 10 INJECTION, EMULSION INTRAVENOUS at 10:34

## 2023-06-27 NOTE — ANESTHESIA PREPROCEDURE EVALUATION
Procedure:  EGD    Relevant Problems   CARDIO   (+) Aortic atherosclerosis (HCC)   (+) Hyperlipidemia   (+) PAF (paroxysmal atrial fibrillation) (HCC)      GI/HEPATIC   (+) GERD (gastroesophageal reflux disease)      MUSCULOSKELETAL   (+) DDD (degenerative disc disease), lumbar   (+) Rectus diastasis   (+) Rheumatoid arthritis (HCC)      NEURO/PSYCH   (+) Chronic pain disorder      PULMONARY   (+) Sleep apnea      Nervous and Auditory   (+) Opioid dependence with opioid-induced disorder (HCC)      Other   (+) CPAP (continuous positive airway pressure) dependence   (+) Right foot ulcer, with fat layer exposed ([address] )        Physical Exam    Airway    Mallampati score: II  TM Distance: >3 FB  Neck ROM: full     Dental   Comment: Multiple missing teeth,     Cardiovascular      Pulmonary      Other Findings        Anesthesia Plan  ASA Score- 3     Anesthesia Type- IV sedation with anesthesia with ASA Monitors  Additional Monitors:   Airway Plan:           Plan Factors-Exercise tolerance (METS): <4 METS  Chart reviewed  Patient summary reviewed  Patient is not a current smoker  Patient did not smoke on day of surgery  Induction- intravenous  Postoperative Plan-     Informed Consent- Anesthetic plan and risks discussed with patient  I personally reviewed this patient with the CRNA  Discussed and agreed on the Anesthesia Plan with the CRNA  Karishma Vargas

## 2023-06-27 NOTE — ANESTHESIA POSTPROCEDURE EVALUATION
Post-Op Assessment Note    CV Status:  Stable    Pain management: satisfactory to patient     Mental Status:  Alert and awake   Hydration Status:  Stable   PONV Controlled:  None   Airway Patency:  Patent      Post Op Vitals Reviewed: Yes      Staff: CRNA         No notable events documented      BP 90/53 (06/27/23 1048)    Temp 97 5 °F (36 4 °C) (06/27/23 1048)    Pulse 63 (06/27/23 1048)   Resp 16 (06/27/23 1048)    SpO2 98 % (06/27/23 1048)

## 2023-06-29 PROCEDURE — 88305 TISSUE EXAM BY PATHOLOGIST: CPT | Performed by: STUDENT IN AN ORGANIZED HEALTH CARE EDUCATION/TRAINING PROGRAM

## 2023-09-21 DIAGNOSIS — M62.838 MUSCLE SPASM: ICD-10-CM

## 2023-09-21 NOTE — TELEPHONE ENCOUNTER
[address] [place] [address]   [id number] [facility]     Phone number for pharmacy: [phone]

## 2023-09-22 RX ORDER — ORPHENADRINE CITRATE 100 MG/1
100 TABLET, EXTENDED RELEASE ORAL 3 TIMES DAILY PRN
Qty: 270 TABLET | Refills: 0 | Status: SHIPPED | OUTPATIENT
Start: 2023-09-22 | End: 2023-09-29 | Stop reason: SDUPTHER

## 2023-09-29 DIAGNOSIS — M62.838 MUSCLE SPASM: ICD-10-CM

## 2023-09-29 RX ORDER — ORPHENADRINE CITRATE 100 MG/1
100 TABLET, EXTENDED RELEASE ORAL 3 TIMES DAILY PRN
Qty: 270 TABLET | Refills: 0 | Status: SHIPPED | OUTPATIENT
Start: 2023-09-29

## 2023-09-29 NOTE — TELEPHONE ENCOUNTER
Medication was sent to the wrong pharmacy, Please send to the [facility] on [place]

## 2023-10-05 ENCOUNTER — RA CDI HCC (OUTPATIENT)
Dept: OTHER | Facility: HOSPITAL | Age: 68
End: 2023-10-05

## 2023-10-05 NOTE — PROGRESS NOTES
[address] [facility] coding opportunities       Chart reviewed, no opportunity found: CHART REVIEWED, NO OPPORTUNITY FOUND        Patients Insurance     Medicare Insurance: Medicare

## 2023-10-12 ENCOUNTER — OFFICE VISIT (OUTPATIENT)
Dept: FAMILY MEDICINE CLINIC | Facility: CLINIC | Age: 68
End: 2023-10-12
Payer: MEDICARE

## 2023-10-12 VITALS
TEMPERATURE: 97 F | HEIGHT: 67 IN | SYSTOLIC BLOOD PRESSURE: 124 MMHG | BODY MASS INDEX: 26.68 KG/M2 | DIASTOLIC BLOOD PRESSURE: 80 MMHG | OXYGEN SATURATION: 99 % | HEART RATE: 66 BPM | WEIGHT: 170 LBS | RESPIRATION RATE: 16 BRPM

## 2023-10-12 DIAGNOSIS — M62.08 RECTUS DIASTASIS: ICD-10-CM

## 2023-10-12 DIAGNOSIS — K40.90 RIGHT INGUINAL HERNIA: ICD-10-CM

## 2023-10-12 DIAGNOSIS — Z99.89 CPAP (CONTINUOUS POSITIVE AIRWAY PRESSURE) DEPENDENCE: ICD-10-CM

## 2023-10-12 DIAGNOSIS — K21.9 GASTROESOPHAGEAL REFLUX DISEASE, UNSPECIFIED WHETHER ESOPHAGITIS PRESENT: ICD-10-CM

## 2023-10-12 DIAGNOSIS — M06.9 RHEUMATOID ARTHRITIS, INVOLVING UNSPECIFIED SITE, UNSPECIFIED WHETHER RHEUMATOID FACTOR PRESENT (HCC): ICD-10-CM

## 2023-10-12 DIAGNOSIS — G89.4 CHRONIC PAIN DISORDER: ICD-10-CM

## 2023-10-12 DIAGNOSIS — I48.0 PAF (PAROXYSMAL ATRIAL FIBRILLATION) (HCC): ICD-10-CM

## 2023-10-12 DIAGNOSIS — E78.5 HYPERLIPIDEMIA, UNSPECIFIED HYPERLIPIDEMIA TYPE: ICD-10-CM

## 2023-10-12 DIAGNOSIS — Z12.5 SCREENING FOR PROSTATE CANCER: ICD-10-CM

## 2023-10-12 DIAGNOSIS — G47.00 INSOMNIA, UNSPECIFIED TYPE: ICD-10-CM

## 2023-10-12 DIAGNOSIS — M51.36 DDD (DEGENERATIVE DISC DISEASE), LUMBAR: ICD-10-CM

## 2023-10-12 DIAGNOSIS — Z23 ENCOUNTER FOR IMMUNIZATION: ICD-10-CM

## 2023-10-12 DIAGNOSIS — Z00.00 HEALTH CARE MAINTENANCE: ICD-10-CM

## 2023-10-12 DIAGNOSIS — Z00.00 MEDICARE ANNUAL WELLNESS VISIT, SUBSEQUENT: Primary | ICD-10-CM

## 2023-10-12 PROBLEM — Z89.429: Status: ACTIVE | Noted: 2023-10-12

## 2023-10-12 PROCEDURE — 90662 IIV NO PRSV INCREASED AG IM: CPT

## 2023-10-12 PROCEDURE — G0008 ADMIN INFLUENZA VIRUS VAC: HCPCS

## 2023-10-12 PROCEDURE — G0438 PPPS, INITIAL VISIT: HCPCS | Performed by: FAMILY MEDICINE

## 2023-10-12 PROCEDURE — 99214 OFFICE O/P EST MOD 30 MIN: CPT | Performed by: FAMILY MEDICINE

## 2023-10-12 RX ORDER — ZOLPIDEM TARTRATE 10 MG/1
10 TABLET ORAL
Qty: 30 TABLET | Refills: 3 | Status: SHIPPED | OUTPATIENT
Start: 2023-10-12

## 2023-10-12 RX ORDER — ASCORBIC ACID 500 MG
1 TABLET ORAL DAILY
COMMUNITY

## 2023-10-12 NOTE — PROGRESS NOTES
Assessment and Plan:     Problem List Items Addressed This Visit          Digestive    GERD (gastroesophageal reflux disease)       Cardiovascular and Mediastinum    PAF (paroxysmal atrial fibrillation) (HCC)    Relevant Orders    Lipid Panel with Direct LDL reflex    CBC and differential       Musculoskeletal and Integument    DDD (degenerative disc disease), lumbar    Rheumatoid arthritis (HCC)    Rectus diastasis       Other    Insomnia    Relevant Medications    zolpidem (AMBIEN) 10 mg tablet    Hyperlipidemia    Relevant Orders    Comprehensive metabolic panel    Lipid Panel with Direct LDL reflex    CPAP (continuous positive airway pressure) dependence    Chronic pain disorder    Right inguinal hernia     Other Visit Diagnoses       Medicare annual wellness visit, subsequent    -  Primary    Health care maintenance        Relevant Orders    Comprehensive metabolic panel    Lipid Panel with Direct LDL reflex    CBC and differential    Encounter for immunization        Relevant Orders    influenza vaccine, high-dose, PF 0.7 mL (FLUZONE HIGH-DOSE)    Screening for prostate cancer        Relevant Orders    PSA, Total Screen            Depression Screening and Follow-up Plan: Patient was screened for depression during today's encounter. They screened negative with a PHQ-2 score of 0. Preventive health issues were discussed with patient, and age appropriate screening tests were ordered as noted in patient's After Visit Summary. Personalized health advice and appropriate referrals for health education or preventive services given if needed, as noted in patient's After Visit Summary. History of Present Illness:     Patient presents for a Medicare Wellness Visit    Medicare Wellness Visit (6 month follow up), has smoke and co detector and batteries changed when needed, adl's intact and home is safe. Advanced directives and living will is UTD.         Patient Care Team:  Rosita Shin DO as PCP - General  Javier Diggs DO as PCP - PCP-[facility] Attributed-Roster  DO Zachary Suggs DO Cleta Bustle, MD     Review of Systems:     Review of Systems   Constitutional: Negative. HENT: Negative. Eyes: Negative. Respiratory: Negative. Cardiovascular: Negative. Gastrointestinal: Negative. Endocrine: Negative. Genitourinary: Negative. Musculoskeletal: Negative. Skin: Negative. Allergic/Immunologic: Negative. Neurological: Negative. Hematological: Negative. Psychiatric/Behavioral: Negative.           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    DDD (degenerative disc disease), lumbar    Rheumatoid arthritis ([facility])    Insomnia    PAF (paroxysmal atrial fibrillation) ([facility])    Open wound of right ring finger    Immunosuppression due to chronic steroid use     Hyperlipidemia    GERD (gastroesophageal reflux disease)    CPAP (continuous positive airway pressure) dependence    Sleep apnea    Chronic pain disorder    Opioid dependence with opioid-induced disorder ([address])    Right foot ulcer, with fat layer exposed ([address])    Aortic atherosclerosis ([address])    Right inguinal hernia    Rectus diastasis    Constipation    History of amputation of lesser toe, unspecified laterality ([address])      Past Medical and Surgical History:     Past Medical History:   Diagnosis Date    Acute right lumbar radiculopathy     Allergic rhinitis     Anxiety     Arthritis     Chronic pain disorder     CPAP (continuous positive airway pressure) dependence     Bi-Pap machine    Depression     Dislocation     Right hip prone to dislocations with sudden movement    GERD (gastroesophageal reflux disease)     Hyperlipidemia     Insomnia     PAF (paroxysmal atrial fibrillation) ([facility])     Pain in both lower extremities     Pain in left shoulder     RA (rheumatoid arthritis) ([facility])     Restless leg Rheumatoid arthritis ([facility])     last assessed 07/29/16    Seasonal allergies     Sleep apnea     partial per pt     Past Surgical History:   Procedure Laterality Date    BACK SURGERY      lami L 1-2-3-4    COLONOSCOPY      ELBOW SURGERY Right     tendon repair    FINGER SURGERY Right 12/29/2021    R 4th finger    FOOT SURGERY Bilateral     INGUINAL HERNIA REPAIR      JOINT REPLACEMENT Bilateral     both hips twice    KNEE SURGERY      NERVE BLOCK Right     peripheral nerve block wrist radial    PALATOPHARYNGOPLASTY      AZ AMPUTATION TOE METATARSOPHALANGEAL JOINT Right 4/12/2022    Procedure: 2nd toe amputation at MTPJ;  Surgeon: Sasha Markham DPM;  [facility];  Service: Podiatry    AZ CORRECTION HAMMERTOE Right 1/25/2021    Procedure: 2ND MPJ RELEASE;  Surgeon: Kimberly Vega DPM;  [facility];  Service[facility] [address] [place] [address] [facility] Right 1/25/2021    Procedure: Farzana Zamorano;  Surgeon: Kimberly Vega DPM;  [facility];  Service: Podiatry    AZ STAB PHLEBT VARICOSE VEINS 1 XTR 10-20 STAB INCS Left 12/14/2018    Procedure: LOWER EXTREMITY MICRO PHLEBECTOMIES;  Surgeon: Carloz Fraser DO;  [facility];  Service: Vascular    SHOULDER SURGERY Left     dislocation repair of muscle    SINUS SURGERY      TONSILLECTOMY AND ADENOIDECTOMY      UMBILICAL HERNIA REPAIR        Family History:     Family History   Problem Relation Age of Onset    Heart disease Mother     Heart failure Mother     Transient ischemic attack Father     Gout Brother     Stroke Family         CVA      Social History:     Social History     Socioeconomic History    Marital status: /Civil Union     Spouse name: None    Number of children: None    Years of education: None    Highest education level: None   Occupational History    None   Tobacco Use    Smoking status: Never    Smokeless tobacco: Never   Vaping Use    Vaping Use: Some days    Substances: THC, CBD Substance and Sexual Activity    Alcohol use: Not Currently     Comment: socially;    Drug use: Yes     Types: Marijuana     Comment: CBD tincture HS    Sexual activity: Not Currently   Other Topics Concern    None   Social History Narrative    Drinks coffee     Social Determinants of Health     Financial Resource Strain: Medium Risk (10/11/2023)    Overall Financial Resource Strain (CARDIA)     Difficulty of Paying Living Expenses: Somewhat hard   Food Insecurity: Not on file   Transportation Needs: No Transportation Needs (10/11/2023)    PRAPARE - Transportation     Lack of Transportation (Medical): No     Lack of Transportation (Non-Medical):  No   Physical Activity: Not on file   Stress: Not on file   Social Connections: Not on file   Intimate Partner Violence: Not on file   Housing Stability: Not on file      Medications and Allergies:     Current Outpatient Medications   Medication Sig Dispense Refill    ascorbic acid (VITAMIN C) 500 mg tablet Take 500 mg by mouth 4 (four) times a day      aspirin (ECOTRIN LOW STRENGTH) 81 mg EC tablet Take 81 mg by mouth every other day      atorvastatin (LIPITOR) 40 mg tablet TAKE 1 TABLET BY MOUTH EVERY DAY AT NIGHT      B-COMPLEX-C PO Take 1 tablet by mouth daily       Boswellia Sally (BOSWELLIA PO) Take by mouth 400 mg 3 x's a day      celecoxib (CeleBREX) 200 mg capsule       Certolizumab Pegol (CIMZIA SC) Inject under the skin      Chelated Zinc 50 MG TABS Take 1 tablet by mouth in the morning      Cholecalciferol (VITAMIN D3) 3000 units TABS Take 2 tablets by mouth daily      Coenzyme Q10 (CO Q 10) 100 MG CAPS Take 1 capsule by mouth daily       cyanocobalamin (VITAMIN B-12) 100 mcg tablet Take by mouth daily      diphenhydrAMINE (BENADRYL) 25 mg tablet Take 25 mg by mouth 3 (three) times a day with meals allergies      esomeprazole (NexIUM) 40 MG capsule Take 1 capsule (40 mg total) by mouth daily as needed (gerd) 90 capsule 3    gabapentin (NEURONTIN) 600 MG tablet Take 1,800 mg by mouth daily at bedtime        glucosamine-chondroitin 500-400 MG tablet Take 1 tablet by mouth 3 (three) times a day      itraconazole (SPORANOX) 100 mg capsule Take 200 mg by mouth 2 (two) times a day      MAGNESIUM PO Take 100 mg by mouth Takes 6 time daily spread throughout the day      metoprolol succinate (Toprol XL) 50 mg 24 hr tablet Take 50 mg by mouth daily at bedtime        milk thistle 175 MG tablet Take 175 mg by mouth daily      minocycline (DYNACIN) 50 MG tablet Take 50 mg by mouth 2 (two) times a day      Multiple Vitamin (multivitamin) tablet Take 1 tablet by mouth daily      OIL OF OREGANO PO Take 1 tablet by mouth daily       Omega-3 Fatty Acids (FISH OIL PO) Take 2 g by mouth 3 (three) times a day with meals       orphenadrine (NORFLEX) 100 mg tablet Take 1 tablet (100 mg total) by mouth 3 (three) times a day as needed for muscle spasms Medically necessary, and needs to state 3 times daily 270 tablet 0    oxyCODONE (ROXICODONE) 10 MG TABS Take 10 mg by mouth 4 (four) times a day   0    pramipexole (MIRAPEX) 0.25 mg tablet Take 0.25 mg by mouth in the morning        predniSONE 10 mg tablet       TURMERIC CURCUMIN PO Take 1,500 mg by mouth Takes 2 tab tid with meals      zolpidem (AMBIEN) 10 mg tablet Take 1 tablet (10 mg total) by mouth daily at bedtime as needed for sleep 30 tablet 3    sildenafil (VIAGRA) 100 mg tablet Take 100 mg by mouth       No current facility-administered medications for this visit. Allergies   Allergen Reactions    Penicillins Anaphylaxis     Category:  Allergy;     Morphine Nausea Only      Immunizations:     Immunization History   Administered Date(s) Administered    COVID-19 MODERNA VACC 0.5 ML IM 03/29/2021, 05/13/2021, 09/10/2021    Hep B, adult 07/15/2013, 08/12/2013, 01/09/2014    INFLUENZA 09/22/2009, 09/13/2010, 09/21/2011, 12/05/2012, 10/10/2013, 10/10/2013, 10/30/2013, 10/17/2014, 12/08/2016, 12/01/2017, 10/04/2018, 12/04/2019, 10/06/2020, 10/28/2021, 10/28/2022    Influenza, injectable, quadrivalent, preservative free 0.5 mL 12/04/2019    Influenza, recombinant, quadrivalent,injectable, preservative free 10/04/2018    Pneumococcal Conjugate Vaccine 20-valent (Pcv20), Polysace 10/04/2022    Pneumococcal Polysaccharide PPV23 12/21/2009    TD (adult) Preservative Free 06/15/1994    Td (adult), adsorbed 06/15/1994    Tdap 08/27/2016      Health Maintenance:         Topic Date Due    Colorectal Cancer Screening  05/13/2033    Hepatitis C Screening  Completed         Topic Date Due    COVID-19 Vaccine (4 - Booster for Moderna series) 11/05/2021    Influenza Vaccine (1) 09/01/2023      Medicare Screening Tests and Risk Assessments:     Larry Blue is here for his Subsequent Wellness visit. Health Risk Assessment:   Patient rates overall health as poor. Patient feels that their physical health rating is slightly worse. Patient is satisfied with their life. Eyesight was rated as slightly worse. Hearing was rated as slightly worse. Patient feels that their emotional and mental health rating is same. Patients states they are never, rarely angry. Patient states they are always unusually tired/fatigued. Pain experienced in the last 7 days has been a lot. Patient's pain rating has been 8/10. Patient states that he has experienced weight loss or gain in last 6 months. Depression Screening:   PHQ-2 Score: 0      Fall Risk Screening: In the past year, patient has experienced: no history of falling in past year      Home Safety:  Patient has trouble with stairs inside or outside of their home. Patient has working smoke alarms and has working carbon monoxide detector. Home safety hazards include: none. Nutrition:   Current diet is Regular. Medications:   Patient is currently taking over-the-counter supplements. OTC medications include: see medication list. Patient is able to manage medications.      Activities of Daily Living (ADLs)/Instrumental Activities of Daily Living (IADLs):   Walk and transfer into and out of bed and chair?: Yes  Dress and groom yourself?: Yes    Bathe or shower yourself?: Yes    Feed yourself? Yes  Do your laundry/housekeeping?: Yes  Manage your money, pay your bills and track your expenses?: Yes  Make your own meals?: Yes    Do your own shopping?: Yes    Previous Hospitalizations:   Any hospitalizations or ED visits within the last 12 months?: No      Advance Care Planning:   Living will: Yes    Durable POA for healthcare: No    Advanced directive: Yes      PREVENTIVE SCREENINGS      Cardiovascular Screening:    General: Screening Not Indicated and History Lipid Disorder      Colorectal Cancer Screening:     General: Screening Current      Abdominal Aortic Aneurysm (AAA) Screening:    Risk factors include: age between 70-75 yo        Lung Cancer Screening:     General: Screening Not Indicated      Hepatitis C Screening:    General: Screening Current    Screening, Brief Intervention, and Referral to Treatment (SBIRT)    Screening  Typical number of drinks in a day: 0  Typical number of drinks in a week: 0  Interpretation: Low risk drinking behavior. Single Item Drug Screening:  How often have you used an illegal drug (including marijuana) or a prescription medication for non-medical reasons in the past year? never    Single Item Drug Screen Score: 0  Interpretation: Negative screen for possible drug use disorder    Review of Current Opioid Use    Opioid Risk Tool (ORT) Interpretation: Complete Opioid Risk Tool (ORT)    No results found. Physical Exam:     /80   Pulse 66   Temp (!) 97 °F (36.1 °C) (Temporal)   Resp 16   Ht 5' 6.54" (1.69 m)   Wt 77.1 kg (170 lb)   SpO2 99%   BMI 27.00 kg/m²     Physical Exam  Vitals and nursing note reviewed. Constitutional:       General: He is not in acute distress. Appearance: He is well-developed.       Comments: Overweight     HENT:      Head: Normocephalic and atraumatic. Eyes:      Conjunctiva/sclera: Conjunctivae normal.   Cardiovascular:      Rate and Rhythm: Normal rate and regular rhythm. Pulses: Normal pulses. Heart sounds: Normal heart sounds. Pulmonary:      Effort: Pulmonary effort is normal.      Breath sounds: Normal breath sounds. Abdominal:      General: Abdomen is flat. Bowel sounds are normal.      Palpations: Abdomen is soft. Comments: Right inguinal hernia and surgery scheduled for 11/16/23,    Musculoskeletal:         General: Normal range of motion. Cervical back: Neck supple. Comments: and 12/11/23 left wrist fusion scheduled with Dr. Vernon Kong     Skin:     General: Skin is warm and dry. Capillary Refill: Capillary refill takes less than 2 seconds. Findings: No lesion. Neurological:      General: No focal deficit present. Mental Status: He is alert and oriented to person, place, and time. Psychiatric:         Mood and Affect: Mood normal.         Behavior: Behavior normal.         Thought Content:  Thought content normal.         Judgment: Judgment normal.          Mignon Leblanc,

## 2023-10-12 NOTE — PATIENT INSTRUCTIONS
Medicare Preventive Visit Patient Instructions  Thank you for completing your Welcome to Medicare Visit or Medicare Annual Wellness Visit today. Your next wellness visit will be due in one year (10/12/2024). The screening/preventive services that you may require over the next 5-10 years are detailed below. Some tests may not apply to you based off risk factors and/or age. Screening tests ordered at today's visit but not completed yet may show as past due. Also, please note that scanned in results may not display below. Preventive Screenings:  Service Recommendations Previous Testing/Comments   Colorectal Cancer Screening  Colonoscopy    Fecal Occult Blood Test (FOBT)/Fecal Immunochemical Test (FIT)  Fecal DNA/Cologuard Test  Flexible Sigmoidoscopy Age: 43-73 years old   Colonoscopy: every 10 years (May be performed more frequently if at higher risk)  OR  FOBT/FIT: every 1 year  OR  Cologuard: every 3 years  OR  Sigmoidoscopy: every 5 years  Screening may be recommended earlier than age 39 if at higher risk for colorectal cancer. Also, an individualized decision between you and your healthcare provider will decide whether screening between the ages of 77-80 would be appropriate.  Colonoscopy: 05/16/2023  FOBT/FIT: Not on file  Cologuard: 10/12/2021  Sigmoidoscopy: Not on file    Screening Current     Prostate Cancer Screening Individualized decision between patient and health care provider in men between ages of 53-66   Medicare will cover every 12 months beginning on the day after your 50th birthday PSA: 0.2 ng/mL           Hepatitis C Screening Once for adults born between 1945 and 1965  More frequently in patients at high risk for Hepatitis C Hep C Antibody: 04/19/2019    Screening Current   Diabetes Screening 1-2 times per year if you're at risk for diabetes or have pre-diabetes Fasting glucose: 100 mg/dL (12/3/2018)  A1C: No results in last 5 years (No results in last 5 years)      Cholesterol Screening Once every 5 years if you don't have a lipid disorder. May order more often based on risk factors. Lipid panel: 01/01/2022  Screening Not Indicated  History Lipid Disorder      Other Preventive Screenings Covered by Medicare:  Abdominal Aortic Aneurysm (AAA) Screening: covered once if your at risk. You're considered to be at risk if you have a family history of AAA or a male between the age of 70-76 who smoking at least 100 cigarettes in your lifetime. Lung Cancer Screening: covers low dose CT scan once per year if you meet all of the following conditions: (1) Age 48-67; (2) No signs or symptoms of lung cancer; (3) Current smoker or have quit smoking within the last 15 years; (4) You have a tobacco smoking history of at least 20 pack years (packs per day x number of years you smoked); (5) You get a written order from a healthcare provider. Glaucoma Screening: covered annually if you're considered high risk: (1) You have diabetes OR (2) Family history of glaucoma OR (3)  aged 48 and older OR (3)  American aged 72 and older  Osteoporosis Screening: covered every 2 years if you meet one of the following conditions: (1) Have a vertebral abnormality; (2) On glucocorticoid therapy for more than 3 months; (3) Have primary hyperparathyroidism; (4) On osteoporosis medications and need to assess response to drug therapy. HIV Screening: covered annually if you're between the age of 14-79. Also covered annually if you are younger than 13 and older than 72 with risk factors for HIV infection. For pregnant patients, it is covered up to 3 times per pregnancy.     Immunizations:  Immunization Recommendations   Influenza Vaccine Annual influenza vaccination during flu season is recommended for all persons aged >= 6 months who do not have contraindications   Pneumococcal Vaccine   * Pneumococcal conjugate vaccine = PCV13 (Prevnar 13), PCV15 (Vaxneuvance), PCV20 (Prevnar 20)  * Pneumococcal polysaccharide vaccine = PPSV23 (Pneumovax) Adults 36-46 yo with certain risk factors or if 69+ yo  If never received any pneumonia vaccine: recommend Prevnar 20 (PCV20)  Give PCV20 if previously received 1 dose of PCV13 or PPSV23   Hepatitis B Vaccine 3 dose series if at intermediate or high risk (ex: diabetes, end stage renal disease, liver disease)   Respiratory syncytial virus (RSV) Vaccine - COVERED BY MEDICARE PART D  * RSVPreF3 (Arexvy) CDC recommends that adults 61years of age and older may receive a single dose of RSV vaccine using shared clinical decision-making (SCDM)   Tetanus (Td) Vaccine - COST NOT COVERED BY MEDICARE PART B Following completion of primary series, a booster dose should be given every 10 years to maintain immunity against tetanus. Td may also be given as tetanus wound prophylaxis. Tdap Vaccine - COST NOT COVERED BY MEDICARE PART B Recommended at least once for all adults. For pregnant patients, recommended with each pregnancy. Shingles Vaccine (Shingrix) - COST NOT COVERED BY MEDICARE PART B  2 shot series recommended in those 19 years and older who have or will have weakened immune systems or those 50 years and older     Health Maintenance Due:      Topic Date Due    Colorectal Cancer Screening  05/13/2033    Hepatitis C Screening  Completed     Immunizations Due:      Topic Date Due    COVID-19 Vaccine (4 - Booster for Moderna series) 11/05/2021    Influenza Vaccine (1) 09/01/2023     Advance Directives   What are advance directives? Advance directives are legal documents that state your wishes and plans for medical care. These plans are made ahead of time in case you lose your ability to make decisions for yourself. Advance directives can apply to any medical decision, such as the treatments you want, and if you want to donate organs. What are the types of advance directives? There are many types of advance directives, and each state has rules about how to use them.  You may choose a combination of any of the following:  Living will: This is a written record of the treatment you want. You can also choose which treatments you do not want, which to limit, and which to stop at a certain time. This includes surgery, medicine, IV fluid, and tube feedings. Durable power of  for healthcare [facility]): This is a written record that states who you want to make healthcare choices for you when you are unable to make them for yourself. This person, called a proxy, is usually a family member or a friend. You may choose more than 1 proxy. Do not resuscitate (DNR) order:  A DNR order is used in case your heart stops beating or you stop breathing. It is a request not to have certain forms of treatment, such as CPR. A DNR order may be included in other types of advance directives. Medical directive: This covers the care that you want if you are in a coma, near death, or unable to make decisions for yourself. You can list the treatments you want for each condition. Treatment may include pain medicine, surgery, blood transfusions, dialysis, IV or tube feedings, and a ventilator (breathing machine). Values history: This document has questions about your views, beliefs, and how you feel and think about life. This information can help others choose the care that you would choose. Why are advance directives important? An advance directive helps you control your care. Although spoken wishes may be used, it is better to have your wishes written down. Spoken wishes can be misunderstood, or not followed. Treatments may be given even if you do not want them. An advance directive may make it easier for your family to make difficult choices about your care.    Narcotic (Opioid) Safety    Use narcotics safely:  Take prescribed narcotics exactly as directed  Do not give narcotics to others or take narcotics that belong to someone else  Do not mix narcotics without medicines or alcohol  Do not drive or operate heavy machinery after you take the narcotic  Monitor for side effects and notify your healthcare provider if you experienced side effects such as nausea, sleepiness, itching, or trouble thinking clearly. Manage constipation:    Constipation is the most common side effect of narcotic medicine. Constipation is when you have hard, dry bowel movements, or you go longer than usual between bowel movements. Tell your healthcare provider about all changes in your bowel movements while you are taking narcotics. He or she may recommend laxative medicine to help you have a bowel movement. He or she may also change the kind of narcotic you are taking, or change when you take it. The following are more ways you can prevent or relieve constipation:    Drink liquids as directed. You may need to drink extra liquids to help soften and move your bowels. Ask how much liquid to drink each day and which liquids are best for you. Eat high-fiber foods. This may help decrease constipation by adding bulk to your bowel movements. High-fiber foods include fruits, vegetables, whole-grain breads and cereals, and beans. Your healthcare provider or dietitian can help you create a high-fiber meal plan. Your provider may also recommend a fiber supplement if you cannot get enough fiber from food. Exercise regularly. Regular physical activity can help stimulate your intestines. Walking is a good exercise to prevent or relieve constipation. Ask which exercises are best for you. Schedule a time each day to have a bowel movement. This may help train your body to have regular bowel movements. Bend forward while you are on the toilet to help move the bowel movement out. Sit on the toilet for at least 10 minutes, even if you do not have a bowel movement. Store narcotics safely:   Store narcotics where others cannot easily get them. Keep them in a locked cabinet or secure area. Do not  keep them in a purse or other bag you carry with you.  A person may be looking for something else and find the narcotics. Make sure narcotics are stored out of the reach of children. A child can easily overdose on narcotics. Narcotics may look like candy to a small child. The best way to dispose of narcotics: The laws vary by country and area. In the [facility], the best way is to return the narcotics through a take-back program. This program is offered by the [employer]). The following are options for using the program:  Take the narcotics to a [place] collection site. The site is often a law enforcement center. Call your local law enforcement center for scheduled take-back days in your area. You will be given information on where to go if the collection site is in a different location. Take the narcotics to an approved pharmacy or hospital.  A pharmacy or hospital may be set up as a collection site. You will need to ask if it is a [place] collection site if you were not directed there. A pharmacy or doctor's office may not be able to take back narcotics unless it is a [place] site. Use a mail-back system. This means you are given containers to put the narcotics into. You will then mail them in the containers. Use a take-back drop box. This is a place to leave the narcotics at any time. People and animals will not be able to get into the box. Your local law enforcement agency can tell you where to find a drop box in your area. Other ways to manage pain:   Ask your healthcare provider about non-narcotic medicines to control pain. Nonprescription medicines include NSAIDs (such as ibuprofen) and acetaminophen. Prescription medicines include muscle relaxers, antidepressants, and steroids. Pain may be managed without any medicines. Some ways to relieve pain include massage, aromatherapy, or meditation. Physical or occupational therapy may also help.     For more information:   Drug Enforcement Administration  [address], [place] [address]  Phone: 9- 435 - 348-1982  Web Address: [employer]/drug_disposal/    [address] [place] , [facility]  Phone: 1- 493 - 392-8555  Web Address: http://YouTab/     © Copyright [address] 2018 Information is for End User's use only and may not be sold, redistributed or otherwise used for commercial purposes. All illustrations and images included in CareNotes® are the copyrighted property of [employer] or [employer]        Here for AWV and is UTD and ADL's are intact and home is safe and advanced directives and living will is UTD. Here also for recheck and BP stable and will need to see general surgery for right inguinal hernia repair 11/16/23. Take all meds as directd and f-up with Rheumatology for RA. Insomnia stable. Stable chronic pain disorder. Use CPAP as directed. Gerd and hyperlipidemia stable, monitor labs periodically. F-up with Cardiology for hx of a-fib. patient for flu shot today and has left wrist surgery with [facility]/Dr. Mariana Hill in [address] 12/11/23.

## 2023-10-18 DIAGNOSIS — K21.9 GASTROESOPHAGEAL REFLUX DISEASE WITHOUT ESOPHAGITIS: ICD-10-CM

## 2023-10-18 RX ORDER — ESOMEPRAZOLE MAGNESIUM 40 MG/1
40 CAPSULE, DELAYED RELEASE ORAL DAILY PRN
Qty: 90 CAPSULE | Refills: 10 | Status: SHIPPED | OUTPATIENT
Start: 2023-10-18

## 2023-11-07 ENCOUNTER — CONSULT (OUTPATIENT)
Dept: FAMILY MEDICINE CLINIC | Facility: CLINIC | Age: 68
End: 2023-11-07
Payer: MEDICARE

## 2023-11-07 VITALS
RESPIRATION RATE: 16 BRPM | TEMPERATURE: 96.8 F | DIASTOLIC BLOOD PRESSURE: 76 MMHG | OXYGEN SATURATION: 90 % | HEIGHT: 67 IN | WEIGHT: 173.6 LBS | BODY MASS INDEX: 27.25 KG/M2 | HEART RATE: 58 BPM | SYSTOLIC BLOOD PRESSURE: 124 MMHG

## 2023-11-07 DIAGNOSIS — G47.30 SLEEP APNEA, UNSPECIFIED TYPE: ICD-10-CM

## 2023-11-07 DIAGNOSIS — K40.90 RIGHT INGUINAL HERNIA: ICD-10-CM

## 2023-11-07 DIAGNOSIS — E78.5 HYPERLIPIDEMIA, UNSPECIFIED HYPERLIPIDEMIA TYPE: ICD-10-CM

## 2023-11-07 DIAGNOSIS — Z89.429 HISTORY OF AMPUTATION OF LESSER TOE, UNSPECIFIED LATERALITY (HCC): ICD-10-CM

## 2023-11-07 DIAGNOSIS — G47.00 INSOMNIA, UNSPECIFIED TYPE: ICD-10-CM

## 2023-11-07 DIAGNOSIS — Z01.818 PREOP EXAMINATION: Primary | ICD-10-CM

## 2023-11-07 DIAGNOSIS — I83.892 VARICOSE VEINS OF LEFT LOWER EXTREMITY WITH COMPLICATIONS: ICD-10-CM

## 2023-11-07 DIAGNOSIS — M51.36 DDD (DEGENERATIVE DISC DISEASE), LUMBAR: ICD-10-CM

## 2023-11-07 DIAGNOSIS — K21.9 GASTROESOPHAGEAL REFLUX DISEASE, UNSPECIFIED WHETHER ESOPHAGITIS PRESENT: ICD-10-CM

## 2023-11-07 DIAGNOSIS — Z99.89 CPAP (CONTINUOUS POSITIVE AIRWAY PRESSURE) DEPENDENCE: ICD-10-CM

## 2023-11-07 DIAGNOSIS — I48.0 PAF (PAROXYSMAL ATRIAL FIBRILLATION) (HCC): ICD-10-CM

## 2023-11-07 DIAGNOSIS — M06.9 RHEUMATOID ARTHRITIS, INVOLVING UNSPECIFIED SITE, UNSPECIFIED WHETHER RHEUMATOID FACTOR PRESENT (HCC): ICD-10-CM

## 2023-11-07 PROCEDURE — 99213 OFFICE O/P EST LOW 20 MIN: CPT | Performed by: FAMILY MEDICINE

## 2023-11-07 RX ORDER — OXYCODONE AND ACETAMINOPHEN 10; 325 MG/1; MG/1
1 TABLET ORAL EVERY 6 HOURS
COMMUNITY
Start: 2023-10-30

## 2023-11-07 NOTE — PATIENT INSTRUCTIONS
Patient is medically cleared but will need cardiac clearance for hx of PAF and hyperlipidemia and sinus bradycardia at 59 BPM. Rec Cardiac clearance prior to Right inguinal hernia. Awaiting labs and EKG done and will await cardiac clearance.

## 2023-11-07 NOTE — PROGRESS NOTES
Chief Complaint   Patient presents with    Pre-op Exam     Repair of recurrent right inguinal hernia with mesh. Pt had labs and EKG done today      Patient Instructions   Patient is medically cleared but will need cardiac clearance for hx of PAF and hyperlipidemia and sinus bradycardia at 59 BPM. Rec Cardiac clearance prior to Right inguinal hernia. Awaiting labs and EKG done and will await cardiac clearance. Assessment/Plan:    No problem-specific Assessment & Plan notes found for this encounter. 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 male. Pre-op Exam (Repair of recurrent right inguinal hernia with mesh. Pt had labs and EKG done today )        The following portions of the patient's history were reviewed and updated as appropriate: allergies, current medications, past family history, past medical history, past social history, past surgical history, and problem list.    Review of Systems   Constitutional: Negative. HENT: Negative. Eyes: Negative. Respiratory: Negative. Cardiovascular: Negative. Gastrointestinal: Negative. Endocrine: Negative. Genitourinary: Negative. Musculoskeletal: Negative. Skin: Negative. Allergic/Immunologic: Negative. Neurological: Negative. Hematological: Negative. Psychiatric/Behavioral: Negative. Objective:      /76 (BP Location: Left arm, Patient Position: Sitting, Cuff Size: Adult)   Pulse 58   Temp (!) 96.8 °F (36 °C) (Temporal)   Resp 16   Ht 5' 6.8" (1.697 m)   Wt 78.7 kg (173 lb 9.6 oz)   SpO2 90%   BMI 27.35 kg/m²          Physical Exam  Constitutional:       Appearance: He is well-developed. Comments: overweight   HENT:      Head: Normocephalic and atraumatic. Right Ear: External ear normal.      Left Ear: External ear normal.      Nose: Nose normal.      Mouth/Throat:      Mouth: Mucous membranes are moist.   Eyes:      Conjunctiva/sclera: Conjunctivae normal.      Pupils: Pupils are equal, round, and reactive to light. Cardiovascular:      Rate and Rhythm: Normal rate and regular rhythm. Heart sounds: Normal heart sounds. Pulmonary:      Effort: Pulmonary effort is normal.      Breath sounds: Normal breath sounds. Abdominal:      General: Abdomen is flat. Bowel sounds are normal.      Palpations: Abdomen is soft. Genitourinary:     Comments: right inguinal hernia   Musculoskeletal:         General: Normal range of motion. Cervical back: Normal range of motion and neck supple. Skin:     General: Skin is warm and dry. Capillary Refill: Capillary refill takes less than 2 seconds. Neurological:      General: No focal deficit present. Mental Status: He is alert and oriented to person, place, and time. Mental status is at baseline. Deep Tendon Reflexes: Reflexes are normal and symmetric. Psychiatric:         Mood and Affect: Mood normal.         Behavior: Behavior normal.         Thought Content:  Thought content normal.         Judgment: Judgment normal.

## 2023-12-11 DIAGNOSIS — L85.3 DRY SKIN DERMATITIS: Primary | ICD-10-CM

## 2023-12-11 DIAGNOSIS — R23.4 CRACKED SKIN: ICD-10-CM

## 2023-12-11 RX ORDER — TRIAMCINOLONE ACETONIDE 1 MG/G
OINTMENT TOPICAL 2 TIMES DAILY PRN
Qty: 80 G | Refills: 1 | Status: SHIPPED | OUTPATIENT
Start: 2023-12-11

## 2024-01-02 DIAGNOSIS — M62.838 MUSCLE SPASM: ICD-10-CM

## 2024-01-02 RX ORDER — ORPHENADRINE CITRATE 100 MG/1
100 TABLET, EXTENDED RELEASE ORAL 3 TIMES DAILY PRN
Qty: 270 TABLET | Refills: 0 | Status: SHIPPED | OUTPATIENT
Start: 2024-01-02

## 2024-02-21 ENCOUNTER — TELEPHONE (OUTPATIENT)
Age: 69
End: 2024-02-21

## 2024-02-21 NOTE — TELEPHONE ENCOUNTER
Patients wife called in asking what labs her  needs done and what fasting he has to follow. Provided patient with information .

## 2024-02-29 ENCOUNTER — TELEPHONE (OUTPATIENT)
Dept: FAMILY MEDICINE CLINIC | Facility: CLINIC | Age: 69
End: 2024-02-29

## 2024-02-29 ENCOUNTER — TELEPHONE (OUTPATIENT)
Age: 69
End: 2024-02-29

## 2024-02-29 DIAGNOSIS — Z00.00 HEALTH CARE MAINTENANCE: ICD-10-CM

## 2024-02-29 DIAGNOSIS — E78.5 HYPERLIPIDEMIA, UNSPECIFIED HYPERLIPIDEMIA TYPE: Primary | ICD-10-CM

## 2024-02-29 DIAGNOSIS — Z12.5 SCREENING FOR PROSTATE CANCER: ICD-10-CM

## 2024-02-29 NOTE — TELEPHONE ENCOUNTER
Patient is at the lab now to get his labs done but they are dated 4/12/24. He was wondering if it can be change cause he is fasting?

## 2024-02-29 NOTE — TELEPHONE ENCOUNTER
Left detailed message for pt, pt must wait for date on script. Date on script is when he is due for labs due to insurance if we switch to sooner pt at risk for a large bill

## 2024-02-29 NOTE — TELEPHONE ENCOUNTER
Matilde from [facility] called; patient was there to complete labs but lab scripts are dated 4/12/24; requested if date can be changed as patient had fasted. Warm transfer to office; Dr Orellana was in with a patient; will call patient as soon as it's been changed.

## 2024-04-04 DIAGNOSIS — M62.838 MUSCLE SPASM: ICD-10-CM

## 2024-04-04 RX ORDER — ORPHENADRINE CITRATE 100 MG/1
100 TABLET, EXTENDED RELEASE ORAL 3 TIMES DAILY PRN
Qty: 270 TABLET | Refills: 0 | Status: SHIPPED | OUTPATIENT
Start: 2024-04-04

## 2024-04-16 ENCOUNTER — NURSE TRIAGE (OUTPATIENT)
Age: 69
End: 2024-04-16

## 2024-04-16 NOTE — TELEPHONE ENCOUNTER
"Patients wife calling in regarding abdominal pain, vomiting, and diarrhea. Patient has been traveling and eating diner food however states this feels nothing like when he has had food poisoning. Unable to keep liquids down. Throwing up bile and having diarrhea. Advised per protocol to be seen in the ED as patient is at increased risk of diarrhea. Patient verbalized understanding. Unsure if he will go.         Reason for Disposition   MODERATE vomiting (e.g., 3 - 5 times/day) and age > 60 years    Answer Assessment - Initial Assessment Questions  1. VOMITING SEVERITY: \"How many times have you vomited in the past 24 hours?\"      - MILD:  1 - 2 times/day     - MODERATE: 3 - 5 times/day, decreased oral intake without significant weight loss or symptoms of dehydration     - SEVERE: 6 or more times/day, vomits everything or nearly everything, with significant weight loss, symptoms of dehydration       moderate  2. ONSET: \"When did the vomiting begin?\"       Past hour  3. FLUIDS: \"What fluids or food have you vomited up today?\" \"Have you been able to keep any fluids down?\"      bile  4. ABDOMINAL PAIN: \"Are your having any abdominal pain?\" If yes : \"How bad is it and what does it feel like?\" (e.g., crampy, dull, intermittent, constant)       Upper abdomen pain   5. DIARRHEA: \"Is there any diarrhea?\" If Yes, ask: \"How many times today?\"       yes  6. CONTACTS: \"Is there anyone else in the family with the same symptoms?\"       denies  7. CAUSE: \"What do you think is causing your vomiting?\"      Unknown- possible food poisoning  8. HYDRATION STATUS: \"Any signs of dehydration?\" (e.g., dry mouth [not only dry lips], too weak to stand) \"When did you last urinate?\"   denies  9. OTHER SYMPTOMS: \"Do you have any other symptoms?\" (e.g., fever, headache, vertigo, vomiting blood or coffee grounds, recent head injury)      Burning in throat  10. PREGNANCY: \"Is there any chance you are pregnant?\" \"When was your last menstrual period?\"   "      N.a    Protocols used: Vomiting-ADULT-[place]

## 2024-05-16 ENCOUNTER — NURSE TRIAGE (OUTPATIENT)
Age: 69
End: 2024-05-16

## 2024-05-16 NOTE — TELEPHONE ENCOUNTER
"Appointment made for 1750 today. His wife would like a call back if his PCP or Dr. Franco is willing to see him.     Reason for Disposition   Patient wants to be seen (or caregiver requests)    Answer Assessment - Initial Assessment Questions  1. LEVEL OF CONSCIOUSNESS: \"How is he (she, the patient) acting right now?\" (e.g., alert-oriented, confused, lethargic, stuporous, comatose)      Alert with confusion   2. ONSET: \"When did the confusion start?\"  (minutes, hours, days)      1 week ago   3. PATTERN \"Does this come and go, or has it been constant since it started?\"  \"Is it present now?\"      Constant, worsening   4. ALCOHOL or DRUGS: \"Has he been drinking alcohol or taking any drugs?\"       Denies   5. NARCOTIC MEDICATIONS: \"Has he been receiving any narcotic medications?\" (e.g., morphine, Vicodin)      Denies   6. CAUSE: \"What do you think is causing the confusion?\"       Recent hospitalization with norovirus    7. OTHER SYMPTOMS: \"Are there any other symptoms?\" (e.g., difficulty breathing, headache, fever, weakness)      Denies    Protocols used: Confusion - Delirium-ADULT-[place]    "

## 2024-05-16 NOTE — TELEPHONE ENCOUNTER
Spoke with pt and wife, he is going to [facility] now, I advised we can't do ADT but she decided she will just go to the er as that is the closest for her and he was admitted there previously for this

## 2024-05-23 ENCOUNTER — OFFICE VISIT (OUTPATIENT)
Dept: FAMILY MEDICINE CLINIC | Facility: CLINIC | Age: 69
End: 2024-05-23
Payer: MEDICARE

## 2024-05-23 VITALS
TEMPERATURE: 97.5 F | OXYGEN SATURATION: 98 % | WEIGHT: 152.6 LBS | HEART RATE: 60 BPM | DIASTOLIC BLOOD PRESSURE: 72 MMHG | BODY MASS INDEX: 23.95 KG/M2 | HEIGHT: 67 IN | SYSTOLIC BLOOD PRESSURE: 124 MMHG

## 2024-05-23 DIAGNOSIS — K21.9 GASTROESOPHAGEAL REFLUX DISEASE, UNSPECIFIED WHETHER ESOPHAGITIS PRESENT: ICD-10-CM

## 2024-05-23 DIAGNOSIS — G47.00 INSOMNIA, UNSPECIFIED TYPE: ICD-10-CM

## 2024-05-23 DIAGNOSIS — R13.12 OROPHARYNGEAL DYSPHAGIA: ICD-10-CM

## 2024-05-23 DIAGNOSIS — H26.9 CATARACT OF RIGHT EYE, UNSPECIFIED CATARACT TYPE: ICD-10-CM

## 2024-05-23 DIAGNOSIS — M06.9 RHEUMATOID ARTHRITIS, INVOLVING UNSPECIFIED SITE, UNSPECIFIED WHETHER RHEUMATOID FACTOR PRESENT (HCC): ICD-10-CM

## 2024-05-23 DIAGNOSIS — Z01.818 PREOP EXAMINATION: Primary | ICD-10-CM

## 2024-05-23 DIAGNOSIS — E78.5 HYPERLIPIDEMIA, UNSPECIFIED HYPERLIPIDEMIA TYPE: ICD-10-CM

## 2024-05-23 PROBLEM — D69.6 THROMBOCYTOPENIA (HCC): Status: ACTIVE | Noted: 2024-05-23

## 2024-05-23 PROCEDURE — 99212 OFFICE O/P EST SF 10 MIN: CPT | Performed by: FAMILY MEDICINE

## 2024-05-23 NOTE — PATIENT INSTRUCTIONS
Here for preop and is medically cleared for right cataract surgery by Dr. Hairston on 6/17/24. Call if any problems. Gerd stable, low cholesterol diet encouraged, takes sleeping med when needed for insomnia. Will recommend ENT for problems with breathing while chewing food since uvulectomy in 2000  
Skin normal color for race, warm, dry and intact. No evidence of rash.

## 2024-05-23 NOTE — PROGRESS NOTES
"Ambulatory Visit  Name: Kanu Potts      : 1955      MRN: [id number]  Encounter Provider: Uzair Orellana DO  Encounter Date: 2024   Encounter department: [facility] PRIMARY CARE  Chief Complaint   Patient presents with    Pre-op Exam     Cataract 2024 right eye   DR. Hairston      Patient Instructions   Here for preop and is medically cleared for right cataract surgery by Dr. Hairston on 24. Call if any problems. Gerd stable, low cholesterol diet encouraged, takes sleeping med when needed for insomnia. Will recommend ENT for problems with breathing while chewing food since uvulectomy in     Assessment & Plan   30  [unfilled]  History of Present Illness     Pre-op Exam (Cataract 2024 right eye  DR. Hairston )  No cp or sob, or ha and needs medical clearance for right eye cataract surgery. Has some issues with breathing while chewing food. He would like this evaluated.         Review of Systems   Constitutional: Negative.    HENT: Negative.     Eyes:         Right eye cataract   Respiratory: Negative.     Cardiovascular: Negative.    Gastrointestinal: Negative.    Endocrine: Negative.    Genitourinary: Negative.    Musculoskeletal: Negative.    Skin: Negative.    Allergic/Immunologic: Negative.    Neurological: Negative.    Hematological: Negative.    Psychiatric/Behavioral: Negative.         Objective     /72   Pulse 60   Temp 97.5 °F (36.4 °C)   Ht 5' 6.8\" (1.697 m)   Wt 69.2 kg (152 lb 9.6 oz)   SpO2 98%   BMI 24.04 kg/m²     Physical Exam  Constitutional:       Appearance: Normal appearance. He is well-developed.   HENT:      Head: Normocephalic and atraumatic.      Right Ear: Tympanic membrane, ear canal and external ear normal.      Left Ear: Tympanic membrane, ear canal and external ear normal.      Nose: Nose normal.      Mouth/Throat:      Mouth: Mucous membranes are moist.   Eyes:      Extraocular Movements: Extraocular movements intact.      " Conjunctiva/sclera: Conjunctivae normal.      Pupils: Pupils are equal, round, and reactive to light.      Comments: Right eye cataract   Cardiovascular:      Rate and Rhythm: Normal rate and regular rhythm.      Pulses: Normal pulses.      Heart sounds: Normal heart sounds.   Pulmonary:      Effort: Pulmonary effort is normal.      Breath sounds: Normal breath sounds.   Abdominal:      General: Abdomen is flat. Bowel sounds are normal.      Palpations: Abdomen is soft.   Musculoskeletal:         General: Normal range of motion.      Cervical back: Normal range of motion and neck supple.   Skin:     General: Skin is warm and dry.      Capillary Refill: Capillary refill takes less than 2 seconds.   Neurological:      General: No focal deficit present.      Mental Status: He is alert and oriented to person, place, and time. Mental status is at baseline.      Deep Tendon Reflexes: Reflexes are normal and symmetric.   Psychiatric:         Mood and Affect: Mood normal.         Behavior: Behavior normal.         Thought Content: Thought content normal.         Judgment: Judgment normal.       Administrative Statements   I have spent a total time of 30 minutes on 05/23/24 In caring for this patient including Diagnostic results, Prognosis, Risks and benefits of tx options, Instructions for management, Patient and family education, Importance of tx compliance, Risk factor reductions, Impressions, Counseling / Coordination of care, Documenting in the medical record, Reviewing / ordering tests, medicine, procedures  , and Obtaining or reviewing history  .      01-Nov-2022 00:12

## 2024-05-30 ENCOUNTER — TELEPHONE (OUTPATIENT)
Age: 69
End: 2024-05-30

## 2024-05-30 NOTE — TELEPHONE ENCOUNTER
rheumatology is called and Dr. Clara Sandra is requesting to speak with  please call [phone]. Patient comes in monthly for injections and today she noticed a major behavior change since his last visit 4 weeks ago and may 16 th-19 th patient was admitted for behavioral changes and would like to discuss further. I did call the office 2 times and was unable to connect with someone in the office

## 2024-06-06 ENCOUNTER — TELEPHONE (OUTPATIENT)
Age: 69
End: 2024-06-06

## 2024-06-06 NOTE — TELEPHONE ENCOUNTER
Pt called would like to get a referral for inspire evaluation sleep study.    [facility] and they stated forms need to be filled out.  Pt states call [phone] for more information of what is needed for the referral.    Please advise pt [phone]

## 2024-06-06 NOTE — TELEPHONE ENCOUNTER
Clive for rheumo doctor on her voicemail. I believe she stated last week she would be on vacation part of this week. Will await her call back to speak to dr barajas

## 2024-06-24 ENCOUNTER — OFFICE VISIT (OUTPATIENT)
Dept: FAMILY MEDICINE CLINIC | Facility: CLINIC | Age: 69
End: 2024-06-24
Payer: MEDICARE

## 2024-06-24 VITALS
HEIGHT: 67 IN | BODY MASS INDEX: 23.54 KG/M2 | DIASTOLIC BLOOD PRESSURE: 82 MMHG | TEMPERATURE: 96.9 F | OXYGEN SATURATION: 98 % | SYSTOLIC BLOOD PRESSURE: 122 MMHG | WEIGHT: 150 LBS | HEART RATE: 84 BPM

## 2024-06-24 DIAGNOSIS — Z72.820 POOR SLEEP: ICD-10-CM

## 2024-06-24 DIAGNOSIS — R41.3 MEMORY CHANGES: Primary | ICD-10-CM

## 2024-06-24 DIAGNOSIS — Z99.89 CPAP (CONTINUOUS POSITIVE AIRWAY PRESSURE) DEPENDENCE: ICD-10-CM

## 2024-06-24 DIAGNOSIS — M06.9 RHEUMATOID ARTHRITIS, INVOLVING UNSPECIFIED SITE, UNSPECIFIED WHETHER RHEUMATOID FACTOR PRESENT (HCC): ICD-10-CM

## 2024-06-24 DIAGNOSIS — G89.4 CHRONIC PAIN DISORDER: ICD-10-CM

## 2024-06-24 PROCEDURE — G2211 COMPLEX E/M VISIT ADD ON: HCPCS | Performed by: FAMILY MEDICINE

## 2024-06-24 PROCEDURE — 99214 OFFICE O/P EST MOD 30 MIN: CPT | Performed by: FAMILY MEDICINE

## 2024-06-24 NOTE — PROGRESS NOTES
Ambulatory Visit  Name: Kanu Potts      : 1955      MRN: [id number]  Encounter Provider: Uzair Orellana DO  Encounter Date: 2024   Encounter department: [facility] PRIMARY CARE  Chief Complaint   Patient presents with    Memory Loss     medical issues/memory problems/sleep apnea.        Patient Instructions   Follow up with sleep specialist and rec proper sleep habits and also cpap machine as directed for MOUNIKA. Rec also no driving if tired. See rheumatology as directed for RA and chronic pain. Patient is here with wife and allows wife to drive if any issues with safety and tiredness. Discussed no benzodiazepines with hx of MOUNIKA. Monitor labs.     Assessment & Plan   1. Memory changes  Comments:  likely related to poor sleep  2. Poor sleep  Comments:  from sleep apnea  3. CPAP (continuous positive airway pressure) dependence  Comments:  sees sleep specialist  4. Rheumatoid arthritis, involving unspecified site, unspecified whether rheumatoid factor present (HCC)  Comments:  sees rheumatology as directed q 3 months.  5. Chronic pain disorder  Comments:  this is stable and may lead to poor sleep    [unfilled]  History of Present Illness     Memory Loss (medical issues/memory problems/sleep apnea.  )          Review of Systems   Constitutional: Negative.    HENT: Negative.     Eyes: Negative.    Respiratory: Negative.     Cardiovascular: Negative.    Gastrointestinal: Negative.    Endocrine: Negative.    Genitourinary: Negative.    Musculoskeletal: Negative.    Skin: Negative.    Allergic/Immunologic: Negative.    Neurological: Negative.    Hematological: Negative.    Psychiatric/Behavioral:          Memory issues. Related to poor sleep. And is already on Bipap.      Current Outpatient Medications on File Prior to Visit   Medication Sig Dispense Refill    ascorbic acid (VITAMIN C) 500 mg tablet Take 500 mg by mouth 4 (four) times a day      aspirin (ECOTRIN LOW STRENGTH) 81 mg EC  tablet Take 81 mg by mouth every other day      atorvastatin (LIPITOR) 40 mg tablet TAKE 1 TABLET BY MOUTH EVERY DAY AT NIGHT      B-COMPLEX-C PO Take 1 tablet by mouth daily       Boswellia Sally (BOSWELLIA PO) Take by mouth 400 mg 3 x's a day      celecoxib (CeleBREX) 200 mg capsule       Certolizumab Pegol (CIMZIA SC) Inject under the skin      Chelated Zinc 50 MG TABS Take 1 tablet by mouth in the morning      Cholecalciferol (VITAMIN D3) 3000 units TABS Take 2 tablets by mouth daily      Coenzyme Q10 (CO Q 10) 100 MG CAPS Take 1 capsule by mouth daily       cyanocobalamin (VITAMIN B-12) 100 mcg tablet Take by mouth daily      diphenhydrAMINE (BENADRYL) 25 mg tablet Take 25 mg by mouth 3 (three) times a day with meals allergies      esomeprazole (NexIUM) 40 MG capsule TAKE 1 CAPSULE (40 MG TOTAL) BY MOUTH DAILY AS NEEDED (GERD) 90 capsule 10    gabapentin (NEURONTIN) 600 MG tablet Take 1,800 mg by mouth daily at bedtime        glucosamine-chondroitin 500-400 MG tablet Take 1 tablet by mouth 3 (three) times a day      itraconazole (SPORANOX) 100 mg capsule Take 200 mg by mouth 2 (two) times a day      MAGNESIUM PO Take 100 mg by mouth Takes 6 time daily spread throughout the day      metoprolol succinate (Toprol XL) 50 mg 24 hr tablet Take 50 mg by mouth daily at bedtime        milk thistle 175 MG tablet Take 175 mg by mouth daily      minocycline (DYNACIN) 50 MG tablet Take 50 mg by mouth 2 (two) times a day      Multiple Vitamin (multivitamin) tablet Take 1 tablet by mouth daily      OIL OF OREGANO PO Take 1 tablet by mouth daily       Omega-3 Fatty Acids (FISH OIL PO) Take 2 g by mouth 3 (three) times a day with meals       orphenadrine (NORFLEX) 100 mg tablet Take 1 tablet (100 mg total) by mouth 3 (three) times a day as needed for muscle spasms Medically necessary, and needs to state 3 times daily 270 tablet 0    oxyCODONE (ROXICODONE) 10 MG TABS Take 10 mg by mouth 4 (four) times a day   0    pramipexole  "(MIRAPEX) 0.25 mg tablet Take 0.25 mg by mouth in the morning        predniSONE 10 mg tablet       triamcinolone (KENALOG) 0.1 % ointment Apply topically 2 (two) times a day as needed for irritation or rash 80 g 1    TURMERIC CURCUMIN PO Take 1,500 mg by mouth Takes 2 tab tid with meals      zolpidem (AMBIEN) 10 mg tablet Take 1 tablet (10 mg total) by mouth daily at bedtime as needed for sleep 30 tablet 3    oxyCODONE-acetaminophen (PERCOCET)  mg per tablet Take 1 tablet by mouth every 6 (six) hours (Patient not taking: Reported on 5/23/2024)      sildenafil (VIAGRA) 100 mg tablet Take 100 mg by mouth       No current facility-administered medications on file prior to visit.      Objective     /82   Pulse 84   Temp (!) 96.9 °F (36.1 °C) (Temporal)   Ht 5' 6.8\" (1.697 m)   Wt 68 kg (150 lb)   SpO2 98%   BMI 23.63 kg/m²     Physical Exam  Constitutional:       Appearance: He is well-developed.   HENT:      Head: Normocephalic and atraumatic.      Right Ear: External ear normal.      Left Ear: External ear normal.      Nose: Nose normal.   Eyes:      Conjunctiva/sclera: Conjunctivae normal.      Pupils: Pupils are equal, round, and reactive to light.   Cardiovascular:      Rate and Rhythm: Normal rate and regular rhythm.      Heart sounds: Normal heart sounds.   Pulmonary:      Effort: Pulmonary effort is normal.      Breath sounds: Normal breath sounds.   Musculoskeletal:         General: Normal range of motion.      Cervical back: Normal range of motion and neck supple.   Skin:     General: Skin is warm and dry.   Neurological:      General: No focal deficit present.      Mental Status: He is alert and oriented to person, place, and time. Mental status is at baseline.      Deep Tendon Reflexes: Reflexes are normal and symmetric.   Psychiatric:         Mood and Affect: Mood normal.         Behavior: Behavior normal.         Thought Content: Thought content normal.         Judgment: Judgment normal. "      Comments: Sleep problems causing memory issues       Administrative Statements   I have spent a total time of 30 minutes on 06/24/24 In caring for this patient including Diagnostic results, Prognosis, Risks and benefits of tx options, Instructions for management, Patient and family education, Importance of tx compliance, Risk factor reductions, Impressions, Counseling / Coordination of care, Documenting in the medical record, Reviewing / ordering tests, medicine, procedures  , and Obtaining or reviewing history  .

## 2024-06-24 NOTE — PATIENT INSTRUCTIONS
Follow up with sleep specialist and rec proper sleep habits and also cpap machine as directed for MOUNIKA. Rec also no driving if tired. See rheumatology as directed for RA and chronic pain. Patient is here with wife and allows wife to drive if any issues with safety and tiredness. Discussed no benzodiazepines with hx of MOUNIKA. Monitor labs.

## 2024-06-29 DIAGNOSIS — M62.838 MUSCLE SPASM: ICD-10-CM

## 2024-07-01 RX ORDER — ORPHENADRINE CITRATE 100 MG/1
TABLET, EXTENDED RELEASE ORAL
Qty: 270 TABLET | Refills: 0 | Status: SHIPPED | OUTPATIENT
Start: 2024-07-01

## 2024-07-10 ENCOUNTER — NURSE TRIAGE (OUTPATIENT)
Age: 69
End: 2024-07-10

## 2024-07-10 NOTE — TELEPHONE ENCOUNTER
"Reason for Disposition   Looks like a boil, infected sore, deep ulcer, or other infected rash (spreading redness, pus)    Answer Assessment - Initial Assessment Questions  1. ONSET: \"When did the swelling start?\" (e.g., minutes, hours, days)      Yesterday am  2. LOCATION: \"What part of the leg is swollen?\"  \"Are both legs swollen or just one leg?\"      Right leg from ankle to knee  3. SEVERITY: \"How bad is the swelling?\" (e.g., localized; mild, moderate, severe)   - Localized - small area of swelling localized to one leg   - MILD pedal edema - swelling limited to foot and ankle, pitting edema < 1/4 inch (6 mm) deep, rest and elevation eliminate most or all swelling   - MODERATE edema - swelling of lower leg to knee, pitting edema > 1/4 inch (6 mm) deep, rest and elevation only partially reduce swelling   - SEVERE edema - swelling extends above knee, facial or hand swelling present       moderate  4. REDNESS: \"Does the swelling look red or infected?\"      yes  5. PAIN: \"Is the swelling painful to touch?\" If Yes, ask: \"How painful is it?\"   (Scale 1-10; mild, moderate or severe)      mild  6. FEVER: \"Do you have a fever?\" If Yes, ask: \"What is it, how was it measured, and when did it start?\"       101.6  7. CAUSE: \"What do you think is causing the leg swelling?\"      Cellulitis  8. MEDICAL HISTORY: \"Do you have a history of heart failure, kidney disease, liver failure, or cancer?\"      yes  9. RECURRENT SYMPTOM: \"Have you had leg swelling before?\" If Yes, ask: \"When was the last time?\" \"What happened that time?\"      Cellulitis  10. OTHER SYMPTOMS: \"Do you have any other symptoms?\" (e.g., chest pain, difficulty breathing)        no    Protocols used: Leg Swelling and Edema-ADULT-[place]    "

## 2024-07-10 NOTE — TELEPHONE ENCOUNTER
I called and spoke with the patient's wife and he is scheduled for tomorrow. The patient is doing better today and she will alternate tylenol and ibuprofen

## 2024-07-10 NOTE — TELEPHONE ENCOUNTER
Responded to patient's [email or web address] message, call placed to patient. Patient states he has swelling, redness and warmth of right lower leg. No open areas. Is running a fever of 101.2. Declined to go to Urgent Care or the ED. He has an appt scheduled for hesham but spouse thinks he should be seen today. Call placed to the office, they will speak with the Dr and call him back.

## 2024-07-11 ENCOUNTER — OFFICE VISIT (OUTPATIENT)
Dept: FAMILY MEDICINE CLINIC | Facility: CLINIC | Age: 69
End: 2024-07-11
Payer: MEDICARE

## 2024-07-11 VITALS
HEIGHT: 67 IN | BODY MASS INDEX: 24.33 KG/M2 | WEIGHT: 155 LBS | SYSTOLIC BLOOD PRESSURE: 120 MMHG | TEMPERATURE: 97.5 F | OXYGEN SATURATION: 94 % | DIASTOLIC BLOOD PRESSURE: 70 MMHG | HEART RATE: 72 BPM

## 2024-07-11 DIAGNOSIS — D84.821 IMMUNOSUPPRESSION DUE TO CHRONIC STEROID USE (HCC): ICD-10-CM

## 2024-07-11 DIAGNOSIS — L03.115 CELLULITIS OF RIGHT LOWER EXTREMITY: Primary | ICD-10-CM

## 2024-07-11 DIAGNOSIS — R60.0 EDEMA OF RIGHT LOWER EXTREMITY: ICD-10-CM

## 2024-07-11 DIAGNOSIS — Z79.52 IMMUNOSUPPRESSION DUE TO CHRONIC STEROID USE (HCC): ICD-10-CM

## 2024-07-11 DIAGNOSIS — M06.9 RHEUMATOID ARTHRITIS, INVOLVING UNSPECIFIED SITE, UNSPECIFIED WHETHER RHEUMATOID FACTOR PRESENT (HCC): ICD-10-CM

## 2024-07-11 DIAGNOSIS — T38.0X5A IMMUNOSUPPRESSION DUE TO CHRONIC STEROID USE (HCC): ICD-10-CM

## 2024-07-11 PROCEDURE — 99215 OFFICE O/P EST HI 40 MIN: CPT | Performed by: FAMILY MEDICINE

## 2024-07-11 PROCEDURE — G2211 COMPLEX E/M VISIT ADD ON: HCPCS | Performed by: FAMILY MEDICINE

## 2024-07-11 NOTE — PATIENT INSTRUCTIONS
Rec ER for right leg edema and redness and weeping and r/o cellulitis and DVT. Patient is going to [facility].

## 2024-07-11 NOTE — PROGRESS NOTES
Ambulatory Visit  Name: Kanu Potts      : 1955      MRN: [id number]  Encounter Provider: Uzair Orellana DO  Encounter Date: 2024   Encounter department: [facility]  Chief Complaint   Patient presents with    Follow-up     Poss cellulitus , red ,hot to touch, started Tuesday      Patient Instructions   Rec ER for right leg edema and redness and weeping and r/o cellulitis and DVT. Patient is going to [facility].     Assessment & Plan   1. Cellulitis of right lower extremity  Comments:  ER for evaluation  2. Edema of right lower extremity  Comments:  rec ER for evaluation  3. Rheumatoid arthritis, involving unspecified site, unspecified whether rheumatoid factor present (HCC)  Comments:  on meds and sees Rheumatology  4. Immunosuppression due to chronic steroid use (HCC)  Comments:  has been on this med for RA    [unfilled]  History of Present Illness     Follow-up (Poss cellulitus , red ,hot to touch, started Tuesday ) Much worse today than today.         Review of Systems   Constitutional: Negative.    HENT: Negative.     Eyes: Negative.    Respiratory: Negative.     Cardiovascular: Negative.    Gastrointestinal: Negative.    Endocrine: Negative.    Genitourinary: Negative.    Musculoskeletal: Negative.    Skin:  Positive for rash.   Allergic/Immunologic: Negative.    Neurological: Negative.    Hematological: Negative.    Psychiatric/Behavioral: Negative.       Current Outpatient Medications on File Prior to Visit   Medication Sig Dispense Refill    ascorbic acid (VITAMIN C) 500 mg tablet Take 500 mg by mouth 4 (four) times a day      aspirin (ECOTRIN LOW STRENGTH) 81 mg EC tablet Take 81 mg by mouth every other day      atorvastatin (LIPITOR) 40 mg tablet TAKE 1 TABLET BY MOUTH EVERY DAY AT NIGHT      B-COMPLEX-C PO Take 1 tablet by mouth daily       Boswellia Sally (BOSWELLIA PO) Take by mouth 400 mg 3 x's a day      celecoxib (CeleBREX) 200 mg capsule        Certolizumab Pegol (CIMZIA SC) Inject under the skin      Chelated Zinc 50 MG TABS Take 1 tablet by mouth in the morning      Cholecalciferol (VITAMIN D3) 3000 units TABS Take 2 tablets by mouth daily      Coenzyme Q10 (CO Q 10) 100 MG CAPS Take 1 capsule by mouth daily       cyanocobalamin (VITAMIN B-12) 100 mcg tablet Take by mouth daily      diphenhydrAMINE (BENADRYL) 25 mg tablet Take 25 mg by mouth 3 (three) times a day with meals allergies      esomeprazole (NexIUM) 40 MG capsule TAKE 1 CAPSULE (40 MG TOTAL) BY MOUTH DAILY AS NEEDED (GERD) 90 capsule 10    gabapentin (NEURONTIN) 600 MG tablet Take 1,800 mg by mouth daily at bedtime        glucosamine-chondroitin 500-400 MG tablet Take 1 tablet by mouth 3 (three) times a day      itraconazole (SPORANOX) 100 mg capsule Take 200 mg by mouth 2 (two) times a day      MAGNESIUM PO Take 100 mg by mouth Takes 6 time daily spread throughout the day      metoprolol succinate (Toprol XL) 50 mg 24 hr tablet Take 50 mg by mouth daily at bedtime        milk thistle 175 MG tablet Take 175 mg by mouth daily      minocycline (DYNACIN) 50 MG tablet Take 50 mg by mouth 2 (two) times a day      Multiple Vitamin (multivitamin) tablet Take 1 tablet by mouth daily      OIL OF OREGANO PO Take 1 tablet by mouth daily       Omega-3 Fatty Acids (FISH OIL PO) Take 2 g by mouth 3 (three) times a day with meals       orphenadrine (NORFLEX) 100 mg tablet Take 1 tablet (100 mg total) by mouth 3 (three) times a day as needed for muscle spasms 270 tablet 0    oxyCODONE (ROXICODONE) 10 MG TABS Take 10 mg by mouth 4 (four) times a day   0    pramipexole (MIRAPEX) 0.25 mg tablet Take 0.25 mg by mouth in the morning        predniSONE 10 mg tablet       triamcinolone (KENALOG) 0.1 % ointment Apply topically 2 (two) times a day as needed for irritation or rash 80 g 1    TURMERIC CURCUMIN PO Take 1,500 mg by mouth Takes 2 tab tid with meals      zolpidem (AMBIEN) 10 mg tablet Take 1 tablet (10 mg  "total) by mouth daily at bedtime as needed for sleep 30 tablet 3    oxyCODONE-acetaminophen (PERCOCET)  mg per tablet Take 1 tablet by mouth every 6 (six) hours (Patient not taking: Reported on 2024)      sildenafil (VIAGRA) 100 mg tablet Take 100 mg by mouth       No current facility-administered medications on file prior to visit.      Objective     /70   Pulse 72   Temp 97.5 °F (36.4 °C) (Temporal)   Ht 5' 6.8\" (1.697 m)   Wt 70.3 kg (155 lb)   SpO2 94%   BMI 24.42 kg/m²     Physical Exam  Constitutional:       Appearance: He is well-developed.   HENT:      Head: Normocephalic and atraumatic.      Right Ear: External ear normal.      Left Ear: External ear normal.      Nose: Nose normal.   Eyes:      Conjunctiva/sclera: Conjunctivae normal.      Pupils: Pupils are equal, round, and reactive to light.   Cardiovascular:      Rate and Rhythm: Normal rate and regular rhythm.      Pulses: Normal pulses.      Heart sounds: Normal heart sounds.   Pulmonary:      Effort: Pulmonary effort is normal.      Breath sounds: Normal breath sounds.   Musculoskeletal:         General: Normal range of motion.      Cervical back: Normal range of motion and neck supple.      Right lower leg: Edema (weeping drainage right lower leg) present.   Skin:     General: Skin is warm and dry.      Capillary Refill: Capillary refill takes less than 2 seconds.      Findings: Rash (right lower extremity redness and cellulitis and drainage) present.   Neurological:      General: No focal deficit present.      Mental Status: He is alert and oriented to person, place, and time. Mental status is at baseline.      Deep Tendon Reflexes: Reflexes are normal and symmetric.   Psychiatric:         Mood and Affect: Mood normal.         Behavior: Behavior normal.         Thought Content: Thought content normal.         Judgment: Judgment normal.     Ambulatory Visit  Name: Kanu Potts      : 1955      MRN: " [id number]  Encounter Provider: Uzair Orellana DO  Encounter Date: 7/11/2024   Encounter department: [facility]    Assessment & Plan   1. Cellulitis of right lower extremity  Comments:  ER for evaluation  2. Edema of right lower extremity  Comments:  rec ER for evaluation  3. Rheumatoid arthritis, involving unspecified site, unspecified whether rheumatoid factor present (HCC)  Comments:  on meds and sees Rheumatology  4. Immunosuppression due to chronic steroid use (HCC)  Comments:  has been on this med for RA    [unfilled]  History of Present Illness     [unfilled]    [unfilled]  Current Outpatient Medications on File Prior to Visit   Medication Sig Dispense Refill    ascorbic acid (VITAMIN C) 500 mg tablet Take 500 mg by mouth 4 (four) times a day      aspirin (ECOTRIN LOW STRENGTH) 81 mg EC tablet Take 81 mg by mouth every other day      atorvastatin (LIPITOR) 40 mg tablet TAKE 1 TABLET BY MOUTH EVERY DAY AT NIGHT      B-COMPLEX-C PO Take 1 tablet by mouth daily       Boswellia Sally (BOSWELLIA PO) Take by mouth 400 mg 3 x's a day      celecoxib (CeleBREX) 200 mg capsule       Certolizumab Pegol (CIMZIA SC) Inject under the skin      Chelated Zinc 50 MG TABS Take 1 tablet by mouth in the morning      Cholecalciferol (VITAMIN D3) 3000 units TABS Take 2 tablets by mouth daily      Coenzyme Q10 (CO Q 10) 100 MG CAPS Take 1 capsule by mouth daily       cyanocobalamin (VITAMIN B-12) 100 mcg tablet Take by mouth daily      diphenhydrAMINE (BENADRYL) 25 mg tablet Take 25 mg by mouth 3 (three) times a day with meals allergies      esomeprazole (NexIUM) 40 MG capsule TAKE 1 CAPSULE (40 MG TOTAL) BY MOUTH DAILY AS NEEDED (GERD) 90 capsule 10    gabapentin (NEURONTIN) 600 MG tablet Take 1,800 mg by mouth daily at bedtime        glucosamine-chondroitin 500-400 MG tablet Take 1 tablet by mouth 3 (three) times a day      itraconazole (SPORANOX) 100 mg capsule Take 200 mg by mouth 2 (two) times a  "day      MAGNESIUM PO Take 100 mg by mouth Takes 6 time daily spread throughout the day      metoprolol succinate (Toprol XL) 50 mg 24 hr tablet Take 50 mg by mouth daily at bedtime        milk thistle 175 MG tablet Take 175 mg by mouth daily      minocycline (DYNACIN) 50 MG tablet Take 50 mg by mouth 2 (two) times a day      Multiple Vitamin (multivitamin) tablet Take 1 tablet by mouth daily      OIL OF OREGANO PO Take 1 tablet by mouth daily       Omega-3 Fatty Acids (FISH OIL PO) Take 2 g by mouth 3 (three) times a day with meals       orphenadrine (NORFLEX) 100 mg tablet Take 1 tablet (100 mg total) by mouth 3 (three) times a day as needed for muscle spasms 270 tablet 0    oxyCODONE (ROXICODONE) 10 MG TABS Take 10 mg by mouth 4 (four) times a day   0    pramipexole (MIRAPEX) 0.25 mg tablet Take 0.25 mg by mouth in the morning        predniSONE 10 mg tablet       triamcinolone (KENALOG) 0.1 % ointment Apply topically 2 (two) times a day as needed for irritation or rash 80 g 1    TURMERIC CURCUMIN PO Take 1,500 mg by mouth Takes 2 tab tid with meals      zolpidem (AMBIEN) 10 mg tablet Take 1 tablet (10 mg total) by mouth daily at bedtime as needed for sleep 30 tablet 3    oxyCODONE-acetaminophen (PERCOCET)  mg per tablet Take 1 tablet by mouth every 6 (six) hours (Patient not taking: Reported on 5/23/2024)      sildenafil (VIAGRA) 100 mg tablet Take 100 mg by mouth       No current facility-administered medications on file prior to visit.      Objective     /70   Pulse 72   Temp 97.5 °F (36.4 °C) (Temporal)   Ht 5' 6.8\" (1.697 m)   Wt 70.3 kg (155 lb)   SpO2 94%   BMI 24.42 kg/m²     [unfilled]  Administrative Statements   I have spent a total time of 30 minutes in caring for this patient on the day of the visit/encounter including Diagnostic results, Prognosis, Risks and benefits of tx options, Instructions for management, Patient and family education, Importance of tx compliance, Risk factor " reductions, Impressions, Counseling / Coordination of care, Documenting in the medical record, Reviewing / ordering tests, medicine, procedures  , and Obtaining or reviewing history  .        Administrative Statements   I have spent a total time of 30 minutes in caring for this patient on the day of the visit/encounter including Diagnostic results, Prognosis, Risks and benefits of tx options, Instructions for management, Patient and family education, Importance of tx compliance, Risk factor reductions, Impressions, Counseling / Coordination of care, Documenting in the medical record, Reviewing / ordering tests, medicine, procedures  , and Obtaining or reviewing history  .

## 2024-07-12 ENCOUNTER — TELEPHONE (OUTPATIENT)
Age: 69
End: 2024-07-12

## 2024-07-12 NOTE — TELEPHONE ENCOUNTER
Patient's wife called stating that while patient was at the hospital she found a tick embedded in his shoulder.  Wife would like to know if the tick can be sent out to be tested for Lyme disease.    Please call wife back at [phone]

## 2024-07-22 ENCOUNTER — OFFICE VISIT (OUTPATIENT)
Dept: FAMILY MEDICINE CLINIC | Facility: CLINIC | Age: 69
End: 2024-07-22
Payer: MEDICARE

## 2024-07-22 VITALS
HEIGHT: 67 IN | WEIGHT: 161 LBS | SYSTOLIC BLOOD PRESSURE: 124 MMHG | TEMPERATURE: 96.3 F | RESPIRATION RATE: 16 BRPM | BODY MASS INDEX: 25.27 KG/M2 | DIASTOLIC BLOOD PRESSURE: 70 MMHG

## 2024-07-22 DIAGNOSIS — K21.9 GASTROESOPHAGEAL REFLUX DISEASE, UNSPECIFIED WHETHER ESOPHAGITIS PRESENT: ICD-10-CM

## 2024-07-22 DIAGNOSIS — I48.0 PAF (PAROXYSMAL ATRIAL FIBRILLATION) (HCC): ICD-10-CM

## 2024-07-22 DIAGNOSIS — L03.115 CELLULITIS OF RIGHT LOWER EXTREMITY: ICD-10-CM

## 2024-07-22 DIAGNOSIS — M06.9 RHEUMATOID ARTHRITIS, INVOLVING UNSPECIFIED SITE, UNSPECIFIED WHETHER RHEUMATOID FACTOR PRESENT (HCC): ICD-10-CM

## 2024-07-22 DIAGNOSIS — E78.5 HYPERLIPIDEMIA, UNSPECIFIED HYPERLIPIDEMIA TYPE: ICD-10-CM

## 2024-07-22 DIAGNOSIS — Z76.89 ENCOUNTER FOR SUPPORT AND COORDINATION OF TRANSITION OF CARE: Primary | ICD-10-CM

## 2024-07-22 PROCEDURE — 99496 TRANSJ CARE MGMT HIGH F2F 7D: CPT | Performed by: FAMILY MEDICINE

## 2024-07-22 RX ORDER — TAMSULOSIN HYDROCHLORIDE 0.4 MG/1
0.4 CAPSULE ORAL
COMMUNITY
Start: 2024-02-27 | End: 2025-02-26

## 2024-07-22 NOTE — PATIENT INSTRUCTIONS
Here for recheck and TCM discharged last Sunday 8 days ago. Patient here with wife and doing much better with rle cellulitis. Patient to get labs as directed bmp and cbc with diff.

## 2024-07-22 NOTE — PROGRESS NOTES
Ambulatory Visit  Name: Kanu Potts      : 1955      MRN: [id number]  Encounter Provider: Uzair Orellana DO  Encounter Date: 2024   Encounter department: [facility]  Chief Complaint   Patient presents with    Follow-up     Hospital follow up      Patient Instructions   Here for recheck and TCM discharged last  8 days ago. Patient here with wife and doing much better with rle cellulitis. Patient to get labs as directed bmp and cbc with diff.     Assessment & Plan   1. Encounter for support and coordination of transition of care  2. Cellulitis of right lower extremity  -     CBC and differential; Future; Expected date: 2024  -     Basic metabolic panel; Future; Expected date: 2024  -     CBC and differential  -     Basic metabolic panel  3. PAF (paroxysmal atrial fibrillation) ([facility])  Comments:  stable  4. Rheumatoid arthritis, involving unspecified site, unspecified whether rheumatoid factor present ([facility])  Comments:  stable  5. Hyperlipidemia, unspecified hyperlipidemia type  Comments:  stable  6. Gastroesophageal reflux disease, unspecified whether esophagitis present  Comments:  stable    [unfilled]  History of Present Illness     Follow-up (Hospital follow up )    Here for follow up and doing well.     Discharge summary reviewed:    History of present illness and Hospital Course    This is a brief description of the patient's hospital stay; please refer to medical chart for further details.     69 y.o. male with past medical history as mentioned below. He presented to [facility] with complaint of right lower extremity redness and swelling. He and his wife report that this has been present for approximately 2 days. He did have swelling in his   Right lower extremity about 2 weeks ago and there was weeping noted but that has stopped. He does tend to have dryness to both of his lower extremities but does not really do well job at keeping  "those areas moisturized. He does note some fever and chills at home and has been sleeping approximately 20 hours a day which he knows is an indication that he is sick as he states his body\" shuts down in these cases\" he went to his primary care physician today for evaluation who recommended he present to the ED for further evaluation and management which she proceeded with. While in the ED he continues to complain of right leg swelling, redness, warmth and pain. He does report having generalized diffuse pain secondary to his history of rheumatoid arthritis. Typically he is able to ambulate around his home without an assistive device and has been able to still do this without issue. He has been referred by the ED for admission to hospital medicine will be admitted to the medical surgical unit with infectious disease consulted.    In the ED patient's vitals significant for Tmax of 100.5 otherwise vital stable. Lactate 1.8. CMP significant for glucose 130 and sodium 134. CBC with differential significant for platelet count 124. Venous duplex of the right lower extremity with no evidence of DVT. Patient received 1000 mL LR bolus, cefepime and vancomycin as well as oxycodone in the ED.    Patient is started on iv abx for right leg cellulitis. Venous doppler us negative for DVT. A tick was removed from left upper chest wall. Started on doxycyline, ID consulted. Abx changed to ancef and continue doxycyline. Tick born illness lab with ehrlichia and anaplasma were ordered, pt was re evaluated by ID and low suspicious for tick born illness , advise to switch abx to cefadroxil x 5 more days. Patient is on chronic joe cyclin at home     The patient's PCP, Uzair Orellana, , information is not readily available, pt can follow within 1-2 weeks of discharge.     Discharge Instructions and Follow-Ups  Discharge Instructions:   Activity: activity as tolerated.  Diet: Regular Diet..  Recommended outpatient tests:Complete blood " count with differential in in 1 week and Basic metabolic panel in 1 week.    Recommended follow ups:  The patient should follow up with PCP within 1 week of discharge.   Future Appointments   Date Time Provider Department Center   9/26/2024 1:10 PM DO ROGELIO Gonzales  [id number]   11/22/2024 3:00 PM JASON Khanna [facility]           Review of Systems   Constitutional: Negative.    HENT: Negative.     Eyes: Negative.    Respiratory: Negative.     Cardiovascular: Negative.    Gastrointestinal: Negative.    Endocrine: Negative.    Genitourinary: Negative.    Musculoskeletal: Negative.    Skin:  Negative for rash.        Cellulitis resolved   Allergic/Immunologic: Negative.    Neurological: Negative.    Hematological: Negative.    Psychiatric/Behavioral: Negative.       Current Outpatient Medications on File Prior to Visit   Medication Sig Dispense Refill    ascorbic acid (VITAMIN C) 500 mg tablet Take 500 mg by mouth 4 (four) times a day      aspirin (ECOTRIN LOW STRENGTH) 81 mg EC tablet Take 81 mg by mouth every other day      atorvastatin (LIPITOR) 40 mg tablet TAKE 1 TABLET BY MOUTH EVERY DAY AT NIGHT      B-COMPLEX-C PO Take 1 tablet by mouth daily       Boswellia Sally (BOSWELLIA PO) Take by mouth 400 mg 3 x's a day      celecoxib (CeleBREX) 200 mg capsule       Certolizumab Pegol (CIMZIA SC) Inject under the skin      Chelated Zinc 50 MG TABS Take 1 tablet by mouth in the morning      Cholecalciferol (VITAMIN D3) 3000 units TABS Take 2 tablets by mouth daily      Coenzyme Q10 (CO Q 10) 100 MG CAPS Take 1 capsule by mouth daily       cyanocobalamin (VITAMIN B-12) 100 mcg tablet Take by mouth daily      diphenhydrAMINE (BENADRYL) 25 mg tablet Take 25 mg by mouth 3 (three) times a day with meals allergies      esomeprazole (NexIUM) 40 MG capsule TAKE 1 CAPSULE (40 MG TOTAL) BY MOUTH DAILY AS NEEDED (GERD) 90 capsule 10    gabapentin (NEURONTIN) 600 MG tablet Take 1,800 mg by mouth daily at bedtime     "    glucosamine-chondroitin 500-400 MG tablet Take 1 tablet by mouth 3 (three) times a day      itraconazole (SPORANOX) 100 mg capsule Take 200 mg by mouth 2 (two) times a day      MAGNESIUM PO Take 100 mg by mouth Takes 6 time daily spread throughout the day      metoprolol succinate (Toprol XL) 50 mg 24 hr tablet Take 50 mg by mouth daily at bedtime        milk thistle 175 MG tablet Take 175 mg by mouth daily      minocycline (DYNACIN) 50 MG tablet Take 50 mg by mouth 2 (two) times a day      Multiple Vitamin (multivitamin) tablet Take 1 tablet by mouth daily      OIL OF OREGANO PO Take 1 tablet by mouth daily       Omega-3 Fatty Acids (FISH OIL PO) Take 2 g by mouth 3 (three) times a day with meals       orphenadrine (NORFLEX) 100 mg tablet Take 1 tablet (100 mg total) by mouth 3 (three) times a day as needed for muscle spasms 270 tablet 0    oxyCODONE (ROXICODONE) 10 MG TABS Take 10 mg by mouth 4 (four) times a day   0    pramipexole (MIRAPEX) 0.25 mg tablet Take 0.25 mg by mouth in the morning        predniSONE 10 mg tablet       tamsulosin (FLOMAX) 0.4 mg Take 0.4 mg by mouth      TURMERIC CURCUMIN PO Take 1,500 mg by mouth Takes 2 tab tid with meals      zolpidem (AMBIEN) 10 mg tablet Take 1 tablet (10 mg total) by mouth daily at bedtime as needed for sleep 30 tablet 3    oxyCODONE-acetaminophen (PERCOCET)  mg per tablet Take 1 tablet by mouth every 6 (six) hours (Patient not taking: Reported on 5/23/2024)      sildenafil (VIAGRA) 100 mg tablet Take 100 mg by mouth      triamcinolone (KENALOG) 0.1 % ointment Apply topically 2 (two) times a day as needed for irritation or rash (Patient not taking: Reported on 7/22/2024) 80 g 1     No current facility-administered medications on file prior to visit.      Objective     /70   Temp (!) 96.3 °F (35.7 °C) (Temporal)   Resp 16   Ht 5' 6.8\" (1.697 m)   Wt 73 kg (161 lb)   BMI 25.37 kg/m²     Physical Exam  Constitutional:       Appearance: He is " well-developed.   HENT:      Head: Normocephalic and atraumatic.      Right Ear: External ear normal.      Left Ear: External ear normal.      Nose: Nose normal.   Eyes:      Conjunctiva/sclera: Conjunctivae normal.      Pupils: Pupils are equal, round, and reactive to light.   Cardiovascular:      Rate and Rhythm: Normal rate and regular rhythm.      Pulses: Normal pulses.      Heart sounds: Normal heart sounds.   Pulmonary:      Effort: Pulmonary effort is normal.      Breath sounds: Normal breath sounds.   Musculoskeletal:         General: Normal range of motion.      Cervical back: Normal range of motion and neck supple.   Skin:     General: Skin is warm and dry.      Capillary Refill: Capillary refill takes less than 2 seconds.      Findings: No bruising, erythema or rash.      Comments: Cellulitis resolved RLE and edema much improved   Neurological:      General: No focal deficit present.      Mental Status: He is alert and oriented to person, place, and time. Mental status is at baseline.      Deep Tendon Reflexes: Reflexes are normal and symmetric.   Psychiatric:         Mood and Affect: Mood normal.         Behavior: Behavior normal.         Thought Content: Thought content normal.         Judgment: Judgment normal.       Administrative Statements   I have spent a total time of 30 minutes in caring for this patient on the day of the visit/encounter including Diagnostic results, Prognosis, Risks and benefits of tx options, Instructions for management, Patient and family education, Importance of tx compliance, Risk factor reductions, Impressions, Counseling / Coordination of care, Documenting in the medical record, Reviewing / ordering tests, medicine, procedures  , and Obtaining or reviewing history  .

## 2024-07-26 LAB
ANION GAP SERPL CALCULATED.3IONS-SCNC: 11 MMOL/L (ref 3–11)
BASOPHILS # BLD AUTO: 0.1 THOU/CMM (ref 0–0.1)
BASOPHILS NFR BLD AUTO: 1 %
BUN SERPL-MCNC: 19 MG/DL (ref 7–28)
CALCIUM SERPL-MCNC: 9.3 MG/DL (ref 8.5–10.1)
CHLORIDE SERPL-SCNC: 100 MMOL/L (ref 100–109)
CO2 SERPL-SCNC: 28 MMOL/L (ref 21–31)
CREAT SERPL-MCNC: 0.75 MG/DL (ref 0.53–1.3)
CYTOLOGY CMNT CVX/VAG CYTO-IMP: NORMAL
DIFFERENTIAL METHOD BLD: ABNORMAL
EOSINOPHIL # BLD AUTO: 0.5 THOU/CMM (ref 0–0.5)
EOSINOPHIL NFR BLD AUTO: 6 %
ERYTHROCYTE [DISTWIDTH] IN BLOOD BY AUTOMATED COUNT: 14 % (ref 12–16)
GFR/BSA.PRED SERPLBLD CYS-BASED-ARV: 97 ML/MIN/{1.73_M2}
GLUCOSE SERPL-MCNC: 84 MG/DL (ref 65–99)
HCT VFR BLD AUTO: 43.4 % (ref 37–48)
HGB BLD-MCNC: 14.4 G/DL (ref 12.5–17)
LYMPHOCYTES # BLD AUTO: 2.7 THOU/CMM (ref 1–3)
LYMPHOCYTES NFR BLD AUTO: 35 %
MCH RBC QN AUTO: 34.6 PG (ref 27–36)
MCHC RBC AUTO-ENTMCNC: 33.2 G/DL (ref 32–37)
MCV RBC AUTO: 104 FL (ref 80–100)
MONOCYTES # BLD AUTO: 1 THOU/CMM (ref 0.3–1)
MONOCYTES NFR BLD AUTO: 12 %
NEUTROPHILS # BLD AUTO: 3.5 THOU/CMM (ref 1.8–7.8)
NEUTROPHILS NFR BLD AUTO: 46 %
PLATELET # BLD AUTO: 203 THOU/CMM (ref 140–350)
PMV BLD REES-ECKER: 7.8 FL (ref 7.5–11.3)
POTASSIUM SERPL-SCNC: 4.9 MMOL/L (ref 3.5–5.2)
RBC # BLD AUTO: 4.16 MILL/CMM (ref 4–5.4)
SODIUM SERPL-SCNC: 139 MMOL/L (ref 135–145)
WBC # BLD AUTO: 7.7 THOU/CMM (ref 4–10.5)

## 2024-08-07 ENCOUNTER — NURSE TRIAGE (OUTPATIENT)
Age: 69
End: 2024-08-07

## 2024-08-07 DIAGNOSIS — L03.115 CELLULITIS OF RIGHT LOWER EXTREMITY: Primary | ICD-10-CM

## 2024-08-07 RX ORDER — CEFADROXIL 500 MG/1
500 CAPSULE ORAL EVERY 12 HOURS SCHEDULED
Qty: 14 CAPSULE | Refills: 0 | Status: SHIPPED | OUTPATIENT
Start: 2024-08-07 | End: 2024-08-08 | Stop reason: SDUPTHER

## 2024-08-07 NOTE — TELEPHONE ENCOUNTER
"CG called with concerns of R LE cellulitis. Patient has fever 101 orally last PM 99.7 this AM, RLE is warm, red, swelling. Patient has some discomfort. Patient has a recent hx of cellulitis and required an inpatient hospital stay 7/11/24-7/14/24. Patient was seen for a f/u (TCM) on 7/22/24. Patient was treated with cefadroxil 500 mg po 2x day x 7 days previously. No visits were available. Patient was schedule for 8/8/24. Patient is asking if an antibiotic can be prescribed today with a visit arranged for 8/8/24. [facility]acy is [facility] [phone]  Reason for Disposition   Looks like a boil, infected sore, deep ulcer, or other infected rash (spreading redness, pus)    Answer Assessment - Initial Assessment Questions  1. ONSET: \"When did the swelling start?\" (e.g., minutes, hours, days)      Last night  2. LOCATION: \"What part of the leg is swollen?\"  \"Are both legs swollen or just one leg?\"      RLE  3. SEVERITY: \"How bad is the swelling?\" (e.g., localized; mild, moderate, severe)   - Localized - small area of swelling localized to one leg   - MILD pedal edema - swelling limited to foot and ankle, pitting edema < 1/4 inch (6 mm) deep, rest and elevation eliminate most or all swelling   - MODERATE edema - swelling of lower leg to knee, pitting edema > 1/4 inch (6 mm) deep, rest and elevation only partially reduce swelling   - SEVERE edema - swelling extends above knee, facial or hand swelling present       moderate  4. REDNESS: \"Does the swelling look red or infected?\"      red  5. PAIN: \"Is the swelling painful to touch?\" If Yes, ask: \"How painful is it?\"   (Scale 1-10; mild, moderate or severe)      3-4/10  6. FEVER: \"Do you have a fever?\" If Yes, ask: \"What is it, how was it measured, and when did it start?\"       Yes-last night 101 oral: 99.7 oral this AM  7. CAUSE: \"What do you think is causing the leg swelling?\"      cellulitis  8. MEDICAL HISTORY: \"Do you have a history of heart failure, kidney disease, liver " "failure, or cancer?\"      Recent hx RLE cellulitis with inpatient hospital stay  9. RECURRENT SYMPTOM: \"Have you had leg swelling before?\" If Yes, ask: \"When was the last time?\" \"What happened that time?\"      7/11/24  10. OTHER SYMPTOMS: \"Do you have any other symptoms?\" (e.g., chest pain, difficulty breathing)        Red, uncomfortable, edematous RLE suspect cellulitis  11. PREGNANCY: \"Is there any chance you are pregnant?\" \"When was your last menstrual period?\"        N/A    Protocols used: Leg Swelling and Edema-ADULT-[place]    "

## 2024-08-08 ENCOUNTER — OFFICE VISIT (OUTPATIENT)
Dept: FAMILY MEDICINE CLINIC | Facility: CLINIC | Age: 69
End: 2024-08-08
Payer: MEDICARE

## 2024-08-08 VITALS
TEMPERATURE: 97.6 F | SYSTOLIC BLOOD PRESSURE: 110 MMHG | HEIGHT: 66 IN | HEART RATE: 83 BPM | WEIGHT: 161.8 LBS | OXYGEN SATURATION: 98 % | BODY MASS INDEX: 26 KG/M2 | DIASTOLIC BLOOD PRESSURE: 60 MMHG | RESPIRATION RATE: 16 BRPM

## 2024-08-08 DIAGNOSIS — T38.0X5A IMMUNOSUPPRESSION DUE TO CHRONIC STEROID USE (HCC): ICD-10-CM

## 2024-08-08 DIAGNOSIS — M06.9 RHEUMATOID ARTHRITIS, INVOLVING UNSPECIFIED SITE, UNSPECIFIED WHETHER RHEUMATOID FACTOR PRESENT (HCC): Primary | ICD-10-CM

## 2024-08-08 DIAGNOSIS — Z79.52 IMMUNOSUPPRESSION DUE TO CHRONIC STEROID USE (HCC): ICD-10-CM

## 2024-08-08 DIAGNOSIS — L03.115 CELLULITIS OF RIGHT LOWER EXTREMITY: ICD-10-CM

## 2024-08-08 DIAGNOSIS — D84.821 IMMUNOSUPPRESSION DUE TO CHRONIC STEROID USE (HCC): ICD-10-CM

## 2024-08-08 PROCEDURE — 99214 OFFICE O/P EST MOD 30 MIN: CPT | Performed by: FAMILY MEDICINE

## 2024-08-08 PROCEDURE — G2211 COMPLEX E/M VISIT ADD ON: HCPCS | Performed by: FAMILY MEDICINE

## 2024-08-08 RX ORDER — CEFADROXIL 500 MG/1
500 CAPSULE ORAL EVERY 12 HOURS SCHEDULED
Qty: 8 CAPSULE | Refills: 0 | Status: SHIPPED | OUTPATIENT
Start: 2024-08-08 | End: 2024-08-12

## 2024-08-08 NOTE — PROGRESS NOTES
Ambulatory Visit  Name: Kanu Potts      : 1955      MRN: [id number]  Encounter Provider: Uzair Orellana DO  Encounter Date: 2024   Encounter department: [facility] PRIMARY CARE  Chief Complaint   Patient presents with    Leg Swelling     Pt is here for cellulitis of right leg. Leg is red and swollen      Patient Instructions   Take abx and states he knows to go to ER if not better and will call if worse. No fever or pain today but has redness in last 24 hours. Elevate legs and call if not better or go to ER as directed.     Assessment & Plan   1. Rheumatoid arthritis, involving unspecified site, unspecified whether rheumatoid factor present (HCC)  Comments:  immunosupressed and sees Rheumatology as directed  2. Cellulitis of right lower extremity  Comments:  right lower leg cellulitis, rec taking abx and if not improving or worse rec ER  Orders:  -     cefadroxil (DURICEF) 500 mg capsule; Take 1 capsule (500 mg total) by mouth every 12 (twelve) hours for 4 days  3. Immunosuppression due to chronic steroid use (HCC)  Comments:  take abx for cellulitis and go to ER if not better in next 24 hours, patient understands    [unfilled]  History of Present Illness     Leg Swelling (Pt is here for cellulitis of right leg. Leg is red and swollen ). Here with wife and has right lower extremity swelling and redness and started abx and is elevating leg and states no fever and states no pain in right leg. Rash is red and knows to go to ER if worsening redness and not better or develops fever or pain and if spreading. Took 2 doses of abx already.         Review of Systems   Constitutional: Negative.    HENT: Negative.     Eyes: Negative.    Respiratory: Negative.     Cardiovascular: Negative.    Gastrointestinal: Negative.    Endocrine: Negative.    Genitourinary: Negative.    Musculoskeletal: Negative.    Skin:  Positive for rash (redness right lower extremity/cellulitis).    Allergic/Immunologic: Negative.    Neurological: Negative.    Hematological: Negative.    Psychiatric/Behavioral: Negative.       Current Outpatient Medications on File Prior to Visit   Medication Sig Dispense Refill    ascorbic acid (VITAMIN C) 500 mg tablet Take 500 mg by mouth 4 (four) times a day      aspirin (ECOTRIN LOW STRENGTH) 81 mg EC tablet Take 81 mg by mouth every other day      atorvastatin (LIPITOR) 40 mg tablet TAKE 1 TABLET BY MOUTH EVERY DAY AT NIGHT      B-COMPLEX-C PO Take 1 tablet by mouth daily       Boswellia Sally (BOSWELLIA PO) Take by mouth 400 mg 3 x's a day      celecoxib (CeleBREX) 200 mg capsule       Certolizumab Pegol (CIMZIA SC) Inject under the skin      Chelated Zinc 50 MG TABS Take 1 tablet by mouth in the morning      Cholecalciferol (VITAMIN D3) 3000 units TABS Take 2 tablets by mouth daily      Coenzyme Q10 (CO Q 10) 100 MG CAPS Take 1 capsule by mouth daily       cyanocobalamin (VITAMIN B-12) 100 mcg tablet Take by mouth daily      diphenhydrAMINE (BENADRYL) 25 mg tablet Take 25 mg by mouth 3 (three) times a day with meals allergies      esomeprazole (NexIUM) 40 MG capsule TAKE 1 CAPSULE (40 MG TOTAL) BY MOUTH DAILY AS NEEDED (GERD) 90 capsule 10    gabapentin (NEURONTIN) 600 MG tablet Take 1,800 mg by mouth daily at bedtime        glucosamine-chondroitin 500-400 MG tablet Take 1 tablet by mouth 3 (three) times a day      itraconazole (SPORANOX) 100 mg capsule Take 200 mg by mouth 2 (two) times a day      MAGNESIUM PO Take 100 mg by mouth Takes 6 time daily spread throughout the day      metoprolol succinate (Toprol XL) 50 mg 24 hr tablet Take 50 mg by mouth daily at bedtime        milk thistle 175 MG tablet Take 175 mg by mouth daily      minocycline (DYNACIN) 50 MG tablet Take 50 mg by mouth 2 (two) times a day      Multiple Vitamin (multivitamin) tablet Take 1 tablet by mouth daily      OIL OF OREGANO PO Take 1 tablet by mouth daily       Omega-3 Fatty Acids (FISH OIL  "PO) Take 2 g by mouth 3 (three) times a day with meals       orphenadrine (NORFLEX) 100 mg tablet Take 1 tablet (100 mg total) by mouth 3 (three) times a day as needed for muscle spasms 270 tablet 0    oxyCODONE (ROXICODONE) 10 MG TABS Take 10 mg by mouth 4 (four) times a day   0    pramipexole (MIRAPEX) 0.25 mg tablet Take 0.25 mg by mouth in the morning        predniSONE 10 mg tablet       tamsulosin (FLOMAX) 0.4 mg Take 0.4 mg by mouth      TURMERIC CURCUMIN PO Take 1,500 mg by mouth Takes 2 tab tid with meals      zolpidem (AMBIEN) 10 mg tablet Take 1 tablet (10 mg total) by mouth daily at bedtime as needed for sleep 30 tablet 3    [DISCONTINUED] cefadroxil (DURICEF) 500 mg capsule Take 1 capsule (500 mg total) by mouth every 12 (twelve) hours for 7 days 14 capsule 0    oxyCODONE-acetaminophen (PERCOCET)  mg per tablet Take 1 tablet by mouth every 6 (six) hours (Patient not taking: Reported on 5/23/2024)      sildenafil (VIAGRA) 100 mg tablet Take 100 mg by mouth      triamcinolone (KENALOG) 0.1 % ointment Apply topically 2 (two) times a day as needed for irritation or rash (Patient not taking: Reported on 7/22/2024) 80 g 1     No current facility-administered medications on file prior to visit.      Objective     /60   Pulse 83   Temp 97.6 °F (36.4 °C) (Temporal)   Resp 16   Ht 5' 6\" (1.676 m)   Wt 73.4 kg (161 lb 12.8 oz)   SpO2 98%   BMI 26.12 kg/m²     Physical Exam  Constitutional:       Appearance: He is well-developed.   HENT:      Head: Normocephalic and atraumatic.      Right Ear: External ear normal.      Left Ear: External ear normal.      Nose: Nose normal.   Eyes:      Conjunctiva/sclera: Conjunctivae normal.      Pupils: Pupils are equal, round, and reactive to light.   Cardiovascular:      Rate and Rhythm: Normal rate and regular rhythm.      Pulses: Normal pulses.      Heart sounds: Normal heart sounds.   Pulmonary:      Effort: Pulmonary effort is normal.      Breath sounds: " Normal breath sounds.   Musculoskeletal:         General: Normal range of motion.      Cervical back: Normal range of motion and neck supple.   Skin:     General: Skin is warm and dry.      Capillary Refill: Capillary refill takes less than 2 seconds.      Findings: Rash (right lower leg cellulitis) present.   Neurological:      General: No focal deficit present.      Mental Status: He is alert and oriented to person, place, and time. Mental status is at baseline.      Deep Tendon Reflexes: Reflexes are normal and symmetric.   Psychiatric:         Mood and Affect: Mood normal.         Behavior: Behavior normal.         Thought Content: Thought content normal.         Judgment: Judgment normal.       Administrative Statements   I have spent a total time of 30 minutes in caring for this patient on the day of the visit/encounter including Diagnostic results, Prognosis, Risks and benefits of tx options, Instructions for management, Patient and family education, Importance of tx compliance, Risk factor reductions, Impressions, Counseling / Coordination of care, Documenting in the medical record, Reviewing / ordering tests, medicine, procedures  , and Obtaining or reviewing history  .

## 2024-08-08 NOTE — PATIENT INSTRUCTIONS
Take abx and states he knows to go to ER if not better and will call if worse. No fever or pain today but has redness in last 24 hours. Elevate legs and call if not better or go to ER as directed.

## 2024-09-18 ENCOUNTER — TELEPHONE (OUTPATIENT)
Dept: FAMILY MEDICINE CLINIC | Facility: CLINIC | Age: 69
End: 2024-09-18

## 2024-09-18 ENCOUNTER — TELEPHONE (OUTPATIENT)
Age: 69
End: 2024-09-18

## 2024-09-18 DIAGNOSIS — S80.819A ABRASION OF LOWER LEG, UNSPECIFIED LATERALITY, INITIAL ENCOUNTER: Primary | ICD-10-CM

## 2024-09-18 RX ORDER — LEVOFLOXACIN 500 MG/1
500 TABLET, FILM COATED ORAL EVERY 24 HOURS
Qty: 7 TABLET | Refills: 0 | Status: SHIPPED | OUTPATIENT
Start: 2024-09-18 | End: 2024-09-25

## 2024-09-18 NOTE — TELEPHONE ENCOUNTER
----- Message from Uzair Orellana DO sent at 9/18/2024  5:44 PM EDT -----  Regarding: FW: Pt asking for antibiotics  I called in abx to his pharmacy for leg wound and if worse call or go to ER  ----- Message -----  From: Radha Cruz  Sent: 9/18/2024   4:48 PM EDT  To: Uzair Orellana DO  Subject: Pt asking for antibiotics                        Pt called in to ask about you putting in an order of antibiotics for him. He was pulling on his sock and his fingernail snagged on his shin, leaving about an inch long and eighth of an inch deep scratch. Pt does not want to go to the ER to get it checked out, but said that it was looking a little off.

## 2024-09-18 NOTE — TELEPHONE ENCOUNTER
"Patient called with \"a pretty deep cut on his shin\". He was looking for an antibiotic but I transferred him to the practice. They stated he probably should go to ER. They took the call over.  "

## 2024-09-18 NOTE — TELEPHONE ENCOUNTER
Pt called in to ask about Dr. Orellana putting in an order of antibiotics for him. He was pulling on his sock and his fingernail snagged on his shin, leaving about an inch long and eighth of an inch deep scratch. Pt does not want to go to the ER to get it checked out, but said that it was looking a little off. Sent a message to Dr. Orellana to inform him.

## 2024-09-27 DIAGNOSIS — M62.838 MUSCLE SPASM: ICD-10-CM

## 2024-09-30 RX ORDER — ORPHENADRINE CITRATE 100 MG/1
TABLET, EXTENDED RELEASE ORAL
Qty: 270 TABLET | Refills: 0 | Status: SHIPPED | OUTPATIENT
Start: 2024-09-30

## 2024-09-30 NOTE — TELEPHONE ENCOUNTER
Requested medication(s) are due for refill today: Yes  Patient has already received a courtesy refill: No  Other reason request has been forwarded to provider:    Awake/Alert

## 2024-10-09 DIAGNOSIS — S80.819A ABRASION OF LOWER LEG, UNSPECIFIED LATERALITY, INITIAL ENCOUNTER: Primary | ICD-10-CM

## 2024-10-09 RX ORDER — CEFADROXIL 500 MG/1
500 CAPSULE ORAL EVERY 12 HOURS SCHEDULED
Qty: 20 CAPSULE | Refills: 0 | Status: SHIPPED | OUTPATIENT
Start: 2024-10-09 | End: 2024-10-19

## 2024-10-22 ENCOUNTER — TELEPHONE (OUTPATIENT)
Dept: ADMINISTRATIVE | Facility: OTHER | Age: 69
End: 2024-10-22

## 2024-10-22 NOTE — TELEPHONE ENCOUNTER
----- Message from Criselda LEWIS sent at 10/22/2024  7:56 AM EDT -----  10/22/24 7:56 AM    Hello, our patient Kanu Potts has had CRC: Cologuard completed/performed. Please assist in updating the patient chart by pulling the Care Everywhere (CE) document. The date of service is 10/22/2024.     Thank you,  Criselda Brandt  [facility] PRIMARY [facility]

## 2024-10-22 NOTE — TELEPHONE ENCOUNTER
Upon review of the In Basket request we  FOUND THIS REPORT IS LINKED IN  DOS IS 10/2021    Any additional questions or concerns should be emailed to the Practice Liaisons via the appropriate education email address, please do not reply via In Basket.    Thank you  Jennifer Pop MA   [facility]

## 2025-01-02 DIAGNOSIS — M62.838 MUSCLE SPASM: ICD-10-CM

## 2025-01-02 RX ORDER — ORPHENADRINE CITRATE 100 MG/1
100 TABLET ORAL 3 TIMES DAILY
Qty: 270 TABLET | Refills: 0 | Status: SHIPPED | OUTPATIENT
Start: 2025-01-02

## 2025-01-02 NOTE — TELEPHONE ENCOUNTER
Reason for call:   [x] Refill   [] Prior Auth  [] Other:     Office: [facility]   [x] PCP/Provider - Uzair Orellana   [] Specialty/Provider -     Medication: orphenadrine (NORFLEX)     Dose/Frequency: 100 mg/ 1 tab 3 times daily     Quantity: 90 day supply     Pharmacy: [facility]     Does the patient have enough for 3 days?   [] Yes   [x] No - Send as HP to POD

## 2025-01-09 ENCOUNTER — TELEPHONE (OUTPATIENT)
Age: 70
End: 2025-01-09

## 2025-01-09 DIAGNOSIS — K21.9 GASTROESOPHAGEAL REFLUX DISEASE WITHOUT ESOPHAGITIS: ICD-10-CM

## 2025-01-09 RX ORDER — ESOMEPRAZOLE MAGNESIUM 40 MG/1
40 CAPSULE, DELAYED RELEASE ORAL DAILY PRN
Qty: 90 CAPSULE | Refills: 1 | Status: SHIPPED | OUTPATIENT
Start: 2025-01-09

## 2025-01-09 NOTE — TELEPHONE ENCOUNTER
[facility] pharmacist at [facility] patient has been started on a 42 day course of Rx: Itraconazole and is on day 31-42 day regimen.    Rx: Esomeprazole interact with Rx: Itraconazole    Please review and advice  Thank you

## 2025-01-09 NOTE — TELEPHONE ENCOUNTER
Per Dr. Orellana:  I would avoid the esomeprazole if on this med as it can interact and can take otc pepcid instead for any gerd issues.   ___________________________________    Called pharmacy, spoke with pharmacist. She will make note to not start till after finished with first medication.     Call patient and left message.

## 2025-01-09 NOTE — TELEPHONE ENCOUNTER
Pt wife called in for the following med refill to be called in [facility] pharmacy. Thanks

## 2025-01-24 ENCOUNTER — TELEPHONE (OUTPATIENT)
Age: 70
End: 2025-01-24

## 2025-01-24 DIAGNOSIS — K21.9 GASTROESOPHAGEAL REFLUX DISEASE WITHOUT ESOPHAGITIS: ICD-10-CM

## 2025-01-24 RX ORDER — ESOMEPRAZOLE MAGNESIUM 40 MG/1
40 CAPSULE, DELAYED RELEASE ORAL DAILY PRN
Qty: 90 CAPSULE | Refills: 1 | Status: SHIPPED | OUTPATIENT
Start: 2025-01-24

## 2025-02-06 DIAGNOSIS — K21.9 GASTROESOPHAGEAL REFLUX DISEASE WITHOUT ESOPHAGITIS: ICD-10-CM

## 2025-02-06 RX ORDER — ESOMEPRAZOLE MAGNESIUM 40 MG/1
40 CAPSULE, DELAYED RELEASE ORAL DAILY PRN
Qty: 90 CAPSULE | Refills: 1 | Status: SHIPPED | OUTPATIENT
Start: 2025-02-06

## 2025-03-24 DIAGNOSIS — M62.838 MUSCLE SPASM: ICD-10-CM

## 2025-03-24 NOTE — TELEPHONE ENCOUNTER
Medication: orphenadrine (NORFLEX) 100 mg tablet     Dose/Frequency:     Take 1 tablet (100 mg total) by mouth 3 (three) times a day       Quantity: 270    Pharmacy: [id number] [place] [address]    Office:   [x] PCP/Provider -   [] Speciality/Provider -     Does the patient have enough for 3 days?   [x] Yes   [] No - Send as HP to POD

## 2025-03-25 RX ORDER — ORPHENADRINE CITRATE 100 MG/1
100 TABLET ORAL 3 TIMES DAILY
Qty: 270 TABLET | Refills: 0 | Status: SHIPPED | OUTPATIENT
Start: 2025-03-25

## 2025-03-25 NOTE — TELEPHONE ENCOUNTER
Requested medication(s) are due for refill today: Yes  **If antibiotic or given during sick visit, contact patient to discuss current symptoms.   **Confirm prescribing provider    LOV:  8/8/24  **If longer then 1 year, contact patient to schedule annual PRIOR to refilling. Once scheduled, adjust refill for 30 days, no refills.  **Update CareEverywhere to confirm not being seen elsewhere    NOV:  none at this time     Is patient due for annual visit: No  **If future appointment, adjust to annual/follow up.  ** No appointment call to schedule annual/follow up.    Route to PCP, unless PCP no longer here, then physician they are seeing next.

## 2025-03-28 ENCOUNTER — NURSE TRIAGE (OUTPATIENT)
Age: 70
End: 2025-03-28

## 2025-03-28 ENCOUNTER — TELEMEDICINE (OUTPATIENT)
Age: 70
End: 2025-03-28
Payer: MEDICARE

## 2025-03-28 DIAGNOSIS — D84.821 IMMUNOSUPPRESSION DUE TO CHRONIC STEROID USE (HCC): ICD-10-CM

## 2025-03-28 DIAGNOSIS — Z79.52 IMMUNOSUPPRESSION DUE TO CHRONIC STEROID USE (HCC): ICD-10-CM

## 2025-03-28 DIAGNOSIS — M06.9 RHEUMATOID ARTHRITIS, INVOLVING UNSPECIFIED SITE, UNSPECIFIED WHETHER RHEUMATOID FACTOR PRESENT (HCC): ICD-10-CM

## 2025-03-28 DIAGNOSIS — L03.115 CELLULITIS OF RIGHT LOWER EXTREMITY: Primary | ICD-10-CM

## 2025-03-28 DIAGNOSIS — B35.1 ONYCHOMYCOSIS: ICD-10-CM

## 2025-03-28 DIAGNOSIS — T38.0X5A IMMUNOSUPPRESSION DUE TO CHRONIC STEROID USE (HCC): ICD-10-CM

## 2025-03-28 PROCEDURE — 99422 OL DIG E/M SVC 11-20 MIN: CPT | Performed by: INTERNAL MEDICINE

## 2025-03-28 RX ORDER — CEPHALEXIN 500 MG/1
500 CAPSULE ORAL EVERY 6 HOURS SCHEDULED
Qty: 40 CAPSULE | Refills: 0 | Status: SHIPPED | OUTPATIENT
Start: 2025-03-28 | End: 2025-04-07

## 2025-03-28 NOTE — TELEPHONE ENCOUNTER
"FOLLOW UP: No available appointments in PCP office.  Patient refused urgent care.  Scheduled for virtual visit with Dr. López    REASON FOR CONVERSATION: Cellulitis    SYMPTOMS: RLE erythema and swelling.  Reports pain to right leg.  Denies fever or any open areas at this time    OTHER: States that he has had recurring cellulitis in his right leg for the past 4-5 years and is usually prescribed Keflex for this.      DISPOSITION: See Today in Office      Reason for Disposition   Looks like a boil, infected sore, deep ulcer, or other infected rash (spreading redness, pus)    Answer Assessment - Initial Assessment Questions  1. ONSET: \"When did the swelling start?\" (e.g., minutes, hours, days)      Yesterday  2. LOCATION: \"What part of the leg is swollen?\"  \"Are both legs swollen or just one leg?\"      Right leg from mid shin to ankle  3. SEVERITY: \"How bad is the swelling?\" (e.g., localized; mild, moderate, severe)      mild  4. REDNESS: \"Does the swelling look red or infected?\"      Yes, area is very red and warm to touch  5. PAIN: \"Is the swelling painful to touch?\" If Yes, ask: \"How painful is it?\"   (Scale 1-10; mild, moderate or severe)      Yes moderate pain  6. FEVER: \"Do you have a fever?\" If Yes, ask: \"What is it, how was it measured, and when did it start?\"       Denies  7. CAUSE: \"What do you think is causing the leg swelling?\"      Has history of recurring cellulitis in that leg.  8. MEDICAL HISTORY: \"Do you have a history of blood clots (e.g., DVT), cancer, heart failure, kidney disease, or liver failure?\"      History of of cellulitis in rght leg  9. RECURRENT SYMPTOM: \"Have you had leg swelling before?\" If Yes, ask: \"When was the last time?\" \"What happened that time?\"      Yes, usually gets prescribed Keflex for cellulitis  10. OTHER SYMPTOMS: \"Do you have any other symptoms?\" (e.g., chest pain, difficulty breathing)        erythema    Protocols used: Leg Swelling and Edema-Adult-[place]    "

## 2025-03-28 NOTE — TELEPHONE ENCOUNTER
Patient's spouse calling with patient. Complaining of recurrent cellulitis in R LE. Sx began appx 24 hours ago rapidly changing color from light pink to bright red and hot to the touch. Asking for message to PCP to request ABX be ordered. Offered warm transfer to nurse triage to discuss sx and advise as PCP is not in office today. She is agreeable. Warm transferred patient and spouse to Adela at [facility] for further assistance.

## 2025-03-28 NOTE — PROGRESS NOTES
Virtual Regular VisitName: Kanu Potts      : 1955      MRN: [id number]  Encounter Provider: Rick López MD  Encounter Date: 3/28/2025   Encounter department: [facility] PRIMARY CARE  :  Assessment & Plan  Cellulitis of right lower extremity  Patient reports a previous good response to cephalexin, but he needed 10 days course.  Recommend limb elevation, warm compress to help with the symptom improvement.  Continue to monitor symptoms, follow-up with primary provider for persistent or worsening symptoms  Orders:    cephalexin (KEFLEX) 500 mg capsule; Take 1 capsule (500 mg total) by mouth every 6 (six) hours for 10 days    Rheumatoid arthritis, involving unspecified site, unspecified whether rheumatoid factor present (HCC)  Continue current medications per rheumatology       Immunosuppression due to chronic steroid use (HCC)  Currently on Cimzia and steroids for rheumatoid arthritis       Onychomycosis  Involvement of bilateral multiple toenails and  Patient recently seen by [facility] and initiated on itraconazole.           History of Present Illness     Patient reports the redness of the right lower extremity, with some dependent edema since yesterday.  He has had multiple chronic comorbidities, including onychomycosis of her both feet and is on itraconazole.  Patient is also on Cimzia for rheumatoid arthritis.  He also takes chronic steroids for arthritis.      Review of Systems   Constitutional:  Negative for activity change, appetite change, chills, diaphoresis, fatigue, fever and unexpected weight change.   HENT:  Negative for congestion and sore throat.    Respiratory:  Negative for apnea, cough, choking, chest tightness, shortness of breath, wheezing and stridor.    Cardiovascular:  Negative for chest pain, palpitations and leg swelling.   Gastrointestinal:  Negative for abdominal distention, abdominal pain, blood in stool, constipation, nausea and rectal pain.   Genitourinary:   Negative for dysuria, flank pain, frequency and urgency.   Musculoskeletal:  Negative for arthralgias, back pain, gait problem, joint swelling and myalgias.   Skin:  Positive for rash. Negative for color change and pallor.   Neurological:  Negative for headaches.       Objective   There were no vitals taken for this visit.    Physical Exam  Constitutional:       General: He is not in acute distress.     Appearance: Normal appearance. He is normal weight. He is not ill-appearing, toxic-appearing or diaphoretic.   Pulmonary:      Effort: Pulmonary effort is normal.   Musculoskeletal:      Right lower leg: No edema.      Left lower leg: No edema.   Skin:     Comments: Redness on the skin of the right anterior leg, no obvious swelling or purulence seen   Neurological:      Mental Status: He is alert and oriented to person, place, and time.         Administrative Statements   Encounter provider Rick López MD    The Patient is located at Home and in the following state in which I hold an active license [place].    The patient was identified by name and date of birth. Kanu Potts was informed that this is a telemedicine visit and that the visit is being conducted through the Epic Embedded platform. He agrees to proceed..  My office door was closed. No one else was in the room.  He acknowledged consent and understanding of privacy and security of the video platform. The patient has agreed to participate and understands they can discontinue the visit at any time.    I have spent a total time of 20 minutes in caring for this patient on the day of the visit/encounter including Diagnostic results, Prognosis, Risks and benefits of tx options, Instructions for management, Patient and family education, Importance of tx compliance, Risk factor reductions, Impressions, Counseling / Coordination of care, Documenting in the medical record, Reviewing/placing orders in the medical record (including tests, medications, and/or  procedures), and Obtaining or reviewing history  , not including the time spent for establishing the audio/video connection.

## 2025-04-02 ENCOUNTER — TELEPHONE (OUTPATIENT)
Age: 70
End: 2025-04-02

## 2025-04-02 NOTE — TELEPHONE ENCOUNTER
Patient scheduled 4/3  
Spouse called and patient is having cataract surgery on 4/9/25 and needs a pre op..    Dr. Keyes  [facility] E[facility]      I called clerical and everyone was helping other patients.  Pls call patient back and let them know if we can get them in prior to surgery.    [phone]  
Yes

## 2025-04-03 ENCOUNTER — CONSULT (OUTPATIENT)
Dept: FAMILY MEDICINE CLINIC | Facility: CLINIC | Age: 70
End: 2025-04-03
Payer: MEDICARE

## 2025-04-03 VITALS
SYSTOLIC BLOOD PRESSURE: 139 MMHG | WEIGHT: 163 LBS | HEART RATE: 84 BPM | HEIGHT: 66 IN | TEMPERATURE: 99.1 F | RESPIRATION RATE: 16 BRPM | BODY MASS INDEX: 26.2 KG/M2 | DIASTOLIC BLOOD PRESSURE: 80 MMHG | OXYGEN SATURATION: 96 %

## 2025-04-03 DIAGNOSIS — Z01.818 PREOP EXAMINATION: Primary | ICD-10-CM

## 2025-04-03 DIAGNOSIS — M06.9 RHEUMATOID ARTHRITIS, INVOLVING UNSPECIFIED SITE, UNSPECIFIED WHETHER RHEUMATOID FACTOR PRESENT (HCC): ICD-10-CM

## 2025-04-03 DIAGNOSIS — G47.30 SLEEP APNEA, UNSPECIFIED TYPE: ICD-10-CM

## 2025-04-03 DIAGNOSIS — K21.9 GASTROESOPHAGEAL REFLUX DISEASE, UNSPECIFIED WHETHER ESOPHAGITIS PRESENT: ICD-10-CM

## 2025-04-03 DIAGNOSIS — H26.9 CATARACT OF LEFT EYE, UNSPECIFIED CATARACT TYPE: ICD-10-CM

## 2025-04-03 DIAGNOSIS — I48.0 PAF (PAROXYSMAL ATRIAL FIBRILLATION) (HCC): ICD-10-CM

## 2025-04-03 DIAGNOSIS — G47.00 INSOMNIA, UNSPECIFIED TYPE: ICD-10-CM

## 2025-04-03 DIAGNOSIS — E78.5 HYPERLIPIDEMIA, UNSPECIFIED HYPERLIPIDEMIA TYPE: ICD-10-CM

## 2025-04-03 PROCEDURE — 99214 OFFICE O/P EST MOD 30 MIN: CPT | Performed by: FAMILY MEDICINE

## 2025-04-03 RX ORDER — TAMSULOSIN HYDROCHLORIDE 0.4 MG/1
0.8 CAPSULE ORAL
COMMUNITY
Start: 2025-01-02 | End: 2026-01-02

## 2025-04-03 RX ORDER — SULFASALAZINE 500 MG/1
500 TABLET ORAL 2 TIMES DAILY
COMMUNITY

## 2025-04-03 NOTE — PATIENT INSTRUCTIONS
Here for left eye cataract surgery preop by Dr. Hairston on 4/9/25. Patient is medically cleared for surgery at [facility].     Here for preop and is medically cleared for left cataract surgery by Dr. Hairston on 4/9/25. Call if any problems. Gerd stable, low cholesterol diet encouraged, takes sleeping med when needed for insomnia.

## 2025-04-03 NOTE — PROGRESS NOTES
Pre-operative Clearance  Name: Kanu Potts      : 1955      MRN: [id number]  Encounter Provider: Uzair Orellana DO  Encounter Date: 4/3/2025   Encounter department: [facility] PRIMARY CARE  :  Chief Complaint   Patient presents with    Pre-op Exam     cataract surgery on left eye on 25.      Dr. Keyes  [facility]        Patient Instructions   Here for left eye cataract surgery preop by Dr. Hairston on 25. Patient is medically cleared for surgery at  [facility].     Here for preop and is medically cleared for left cataract surgery by Dr. Hairston on 25. Call if any problems. Gerd stable, low cholesterol diet encouraged, takes sleeping med when needed for insomnia.    Assessment & Plan  Preop examination  Medically cleared for surgery for left eye cataract surgery.        Cataract of left eye, unspecified cataract type  Medically cleared for surgery on 25 by Dr. Hairston       Rheumatoid arthritis, involving unspecified site, unspecified whether rheumatoid factor present (HCC)  Stable and sees rheumatology as directed       Insomnia, unspecified type  Stable on med prn       Hyperlipidemia, unspecified hyperlipidemia type  Low cholesterol diet encouraged       Gastroesophageal reflux disease, unspecified whether esophagitis present  stable       Sleep apnea, unspecified type  Stable on CPAP       PAF (paroxysmal atrial fibrillation) (HCC)  Stable and sees cardiology as directed and on baby aspirin daily       Pre-operative Clearance:     Clearance:  Patient is medically optimized (CLEARED) for proposed surgery without any additional cardiac testing.      Medication Instructions:   - Avoid herbs or non-directed vitamins one week prior to surgery    - Alpha-1 Adrenergic Blocker (ie, tamsulosin, doxazosin, alfusozin): Continue to take this medication on your normal schedule.  - Antiepileptic meds: Continue to take this medication on your normal schedule.  - Antiparkinson  meds (ie, carbidopa-levodopa, amantadine, pramipexole, ropinirole): Continue to take this medication on your normal schedule.  - Beta blockers:  Continue to take this medication on your normal schedule.  - Biologic Response Modifiers: Stop taking this medication one week before surgery after talking to prescribing physician/surgeon about the risk of flareup.  - Corticosteroids: Continue to take this medication on your normal schedule.  - Hyperlipidemia meds: Continue to take this medication on your normal schedule.  - Opioids: Continue to take this medication on your normal schedule.  - Sleep meds (zolpidem, zaleplon, eszopiclone): Continue taking medication up to the evening before procedure/surgery, but do not take this medication on the morning of procedure/surgery.  - Sulfasalazine/Azathioprine: Stop taking medication 7 days prior to procedure/surgery.      Depression Screening and Follow-up Plan: Patient was screened for depression during today's encounter. They screened negative with a PHQ-2 score of 0.      Tobacco Cessation Counseling: Tobacco cessation counseling was not provided. The patient is sincerely urged to quit consumption of tobacco. He is not ready to quit tobacco. Patient does not smoke      History of Present Illness     Pre-op Exam (cataract surgery on left eye on 4/9/25.     [facility]  )    No cp or sob, or ha and needs medical clearance for left eye cataract surgery. No cp or sob, or ha. RA stable and sees rheumatology as directed. No fever. No other complaints. Has decreased vision left eye for years.     Pre-op Exam    Pre-operative Risk Factors:    History of cerebrovascular disease: No    History of ischemic heart disease: No  Pre-operative treatment with insulin: No  Pre-operative creatinine >2 mg/dL: No    History of congestive heart failure: No    Review of Systems   Constitutional: Negative.    HENT: Negative.     Eyes:         Left eye cataract   Respiratory:  Negative.     Cardiovascular: Negative.    Gastrointestinal: Negative.    Endocrine: Negative.    Genitourinary: Negative.    Musculoskeletal: Negative.    Skin: Negative.    Allergic/Immunologic: Negative.    Neurological: Negative.    Hematological: Negative.    Psychiatric/Behavioral: Negative.       Past Medical History   Past Medical History:   Diagnosis Date    Acute right lumbar radiculopathy     Allergic rhinitis     Anxiety     Arthritis     Chronic pain disorder     CPAP (continuous positive airway pressure) dependence     Bi-Pap machine    Depression     Dislocation     Right hip prone to dislocations with sudden movement    GERD (gastroesophageal reflux disease)     Hyperlipidemia     Insomnia     PAF (paroxysmal atrial fibrillation) ([facility])     Pain in both lower extremities     Pain in left shoulder     RA (rheumatoid arthritis) ([facility])     Restless leg     Rheumatoid arthritis ([facility])     last assessed 07/29/16    Seasonal allergies     Sleep apnea     partial per pt     Past Surgical History:   Procedure Laterality Date    BACK SURGERY      lami L 1-2-3-4    COLONOSCOPY      ELBOW SURGERY Right     tendon repair    FINGER SURGERY Right 12/29/2021    R 4th finger    FOOT SURGERY Bilateral     INGUINAL HERNIA REPAIR      JOINT REPLACEMENT Bilateral     both hips twice    KNEE SURGERY      NERVE BLOCK Right     peripheral nerve block wrist radial    PALATOPHARYNGOPLASTY      CO AMPUTATION TOE METATARSOPHALANGEAL JOINT Right 4/12/2022    Procedure: 2nd toe amputation at MTPJ;  Surgeon: Mahin Larkin DPM;  Location: [facility];  Service: Podiatry    CO CORRECTION HAMMERTOE Right 1/25/2021    Procedure: 2ND MPJ RELEASE;  Surgeon: Xavi Ellington DPM;  [facility];  Service: Podiatry    CO CORRJ HLX VLGS BNCTY [facility] DSTL METAR OSTEOT Right 1/25/2021    Procedure: BUNIONECTOMY [place];  Surgeon: Xavi Ellington DPM;  Location: [facility];  Service: Podiatry    CO STAB PHLEBT VARICOSE VEINS 1 XTR 10-20 STAB  INCS Left 12/14/2018    Procedure: LOWER EXTREMITY MICRO PHLEBECTOMIES;  Surgeon: Praveen Lyons DO;  [facility];  Service: Vascular    SHOULDER SURGERY Left     dislocation repair of muscle    SINUS SURGERY      TONSILLECTOMY AND ADENOIDECTOMY      UMBILICAL HERNIA REPAIR       Family History   Problem Relation Age of Onset    Heart disease Mother     Heart failure Mother     Transient ischemic attack Father     Gout Brother     Stroke Family         CVA     Social History     Tobacco Use    Smoking status: Never    Smokeless tobacco: Never   Vaping Use    Vaping status: Some Days    Substances: THC, CBD   Substance and Sexual Activity    Alcohol use: Not Currently     Comment: socially;    Drug use: Yes     Types: Marijuana     Comment: CBD tincture HS    Sexual activity: Not Currently     Current Outpatient Medications on File Prior to Visit   Medication Sig    ascorbic acid (VITAMIN C) 500 mg tablet Take 500 mg by mouth 4 (four) times a day    atorvastatin (LIPITOR) 40 mg tablet TAKE 1 TABLET BY MOUTH EVERY DAY AT NIGHT    B-COMPLEX-C PO Take 1 tablet by mouth daily     Boswellia Sally (BOSWELLIA PO) Take by mouth 400 mg 3 x's a day    celecoxib (CeleBREX) 200 mg capsule     cephalexin (KEFLEX) 500 mg capsule Take 1 capsule (500 mg total) by mouth every 6 (six) hours for 10 days    Certolizumab Pegol (CIMZIA SC) Inject under the skin    Chelated Zinc 50 MG TABS Take 1 tablet by mouth in the morning    Cholecalciferol (VITAMIN D3) 3000 units TABS Take 2 tablets by mouth daily    Coenzyme Q10 (CO Q 10) 100 MG CAPS Take 1 capsule by mouth daily     cyanocobalamin (VITAMIN B-12) 100 mcg tablet Take by mouth daily    diphenhydrAMINE (BENADRYL) 25 mg tablet Take 25 mg by mouth 3 (three) times a day with meals allergies    esomeprazole (NexIUM) 40 MG capsule Take 1 capsule (40 mg total) by mouth daily as needed (gerd)    gabapentin (NEURONTIN) 600 MG tablet Take 1,800 mg by mouth daily at bedtime       glucosamine-chondroitin 500-400 MG tablet Take 1 tablet by mouth 3 (three) times a day    itraconazole (SPORANOX) 100 mg capsule Take 200 mg by mouth 2 (two) times a day    MAGNESIUM PO Take 100 mg by mouth Takes 6 time daily spread throughout the day    metoprolol succinate (Toprol XL) 50 mg 24 hr tablet Take 50 mg by mouth daily at bedtime      milk thistle 175 MG tablet Take 175 mg by mouth daily    Multiple Vitamin (multivitamin) tablet Take 1 tablet by mouth daily    Omega-3 Fatty Acids (FISH OIL PO) Take 2 g by mouth 3 (three) times a day with meals     orphenadrine (NORFLEX) 100 mg tablet Take 1 tablet (100 mg total) by mouth 3 (three) times a day    oxyCODONE (ROXICODONE) 10 MG TABS Take 10 mg by mouth 4 (four) times a day     pramipexole (MIRAPEX) 0.25 mg tablet Take 0.25 mg by mouth in the morning      predniSONE 10 mg tablet     sulfaSALAzine (AZULFIDINE) 500 mg tablet Take 500 mg by mouth 2 (two) times a day    tamsulosin (FLOMAX) 0.4 mg Take 0.8 mg by mouth    TURMERIC CURCUMIN PO Take 1,500 mg by mouth Takes 2 tab tid with meals    zolpidem (AMBIEN) 10 mg tablet Take 1 tablet (10 mg total) by mouth daily at bedtime as needed for sleep    [DISCONTINUED] aspirin (ECOTRIN LOW STRENGTH) 81 mg EC tablet Take 81 mg by mouth every other day    oxyCODONE-acetaminophen (PERCOCET)  mg per tablet Take 1 tablet by mouth every 6 (six) hours (Patient not taking: Reported on 4/3/2025)    [DISCONTINUED] minocycline (DYNACIN) 50 MG tablet Take 50 mg by mouth 2 (two) times a day    [DISCONTINUED] OIL OF OREGANO PO Take 1 tablet by mouth daily     [DISCONTINUED] sildenafil (VIAGRA) 100 mg tablet Take 100 mg by mouth    [DISCONTINUED] triamcinolone (KENALOG) 0.1 % ointment Apply topically 2 (two) times a day as needed for irritation or rash (Patient not taking: Reported on 7/22/2024)     Allergies   Allergen Reactions    Penicillins Anaphylaxis     Category: Allergy;     Morphine Nausea Only, Headache and  "Vomiting     morphine     Objective   /80 (BP Location: Right arm)   Pulse 84   Temp 99.1 °F (37.3 °C) (Tympanic)   Resp 16   Ht 5' 6\" (1.676 m)   Wt 73.9 kg (163 lb)   SpO2 96%   BMI 26.31 kg/m²     Physical Exam  Constitutional:       Appearance: He is well-developed.      Comments: overweight   HENT:      Head: Normocephalic and atraumatic.      Right Ear: External ear normal.      Left Ear: External ear normal.      Nose: Nose normal.      Mouth/Throat:      Mouth: Mucous membranes are moist.   Eyes:      Extraocular Movements: Extraocular movements intact.      Conjunctiva/sclera: Conjunctivae normal.      Pupils: Pupils are equal, round, and reactive to light.      Comments: Left eye catract    Cardiovascular:      Rate and Rhythm: Normal rate and regular rhythm.      Pulses: Normal pulses.      Heart sounds: Normal heart sounds.   Pulmonary:      Effort: Pulmonary effort is normal.      Breath sounds: Normal breath sounds.   Abdominal:      General: Abdomen is flat. Bowel sounds are normal.      Palpations: Abdomen is soft.   Musculoskeletal:         General: Normal range of motion.      Cervical back: Normal range of motion and neck supple.   Skin:     General: Skin is warm and dry.      Capillary Refill: Capillary refill takes less than 2 seconds.   Neurological:      General: No focal deficit present.      Mental Status: He is alert and oriented to person, place, and time. Mental status is at baseline.      Deep Tendon Reflexes: Reflexes are normal and symmetric.   Psychiatric:         Mood and Affect: Mood normal.         Behavior: Behavior normal.         Thought Content: Thought content normal.         Judgment: Judgment normal.       Administrative Statements   I have spent a total time of 40 minutes in caring for this patient on the day of the visit/encounter including Diagnostic results, Prognosis, Risks and benefits of tx options, Instructions for management, Patient and family " education, Importance of tx compliance, Risk factor reductions, Impressions, Counseling / Coordination of care, Documenting in the medical record, Reviewing/placing orders in the medical record (including tests, medications, and/or procedures), and Obtaining or reviewing history  .    Uzair Orellana,

## 2025-04-08 ENCOUNTER — TELEPHONE (OUTPATIENT)
Dept: FAMILY MEDICINE CLINIC | Facility: CLINIC | Age: 70
End: 2025-04-08

## 2025-04-08 NOTE — TELEPHONE ENCOUNTER
Called and spoke with patient to schedule AWV.     Patient states he will call back and schedule at another time.

## 2025-04-08 NOTE — TELEPHONE ENCOUNTER
Patient was here for preop, patient is past due to AWV. No appt made on discharge. Please call to schedule Medicare Wellness in 2-3 months.

## 2025-04-25 ENCOUNTER — DOCUMENTATION (OUTPATIENT)
Dept: ADMINISTRATIVE | Facility: OTHER | Age: 70
End: 2025-04-25

## 2025-04-25 NOTE — PROGRESS NOTES
04/25/25 11:42 AM    CRC outreach is not required, Colonoscopy/ FIT/ other screening completed.    Thank you.  Jon Ku MA  [facility]

## 2025-04-25 NOTE — PROGRESS NOTES
04/25/25 11:22 AM    Annual Wellness Visit outreach is not required, the practice has sent a reminder message/ mail for AWV to the patient.    Thank you.  Macy Pascual MA  [facility]

## 2025-06-17 ENCOUNTER — OFFICE VISIT (OUTPATIENT)
Dept: FAMILY MEDICINE CLINIC | Facility: CLINIC | Age: 70
End: 2025-06-17
Payer: MEDICARE

## 2025-06-17 VITALS
OXYGEN SATURATION: 92 % | WEIGHT: 166 LBS | DIASTOLIC BLOOD PRESSURE: 90 MMHG | BODY MASS INDEX: 26.06 KG/M2 | SYSTOLIC BLOOD PRESSURE: 156 MMHG | HEIGHT: 67 IN | TEMPERATURE: 98.3 F | HEART RATE: 86 BPM

## 2025-06-17 DIAGNOSIS — Z00.00 MEDICARE ANNUAL WELLNESS VISIT, SUBSEQUENT: Primary | ICD-10-CM

## 2025-06-17 DIAGNOSIS — G47.00 INSOMNIA, UNSPECIFIED TYPE: ICD-10-CM

## 2025-06-17 DIAGNOSIS — Z12.5 SCREENING FOR PROSTATE CANCER: ICD-10-CM

## 2025-06-17 DIAGNOSIS — E78.5 HYPERLIPIDEMIA, UNSPECIFIED HYPERLIPIDEMIA TYPE: ICD-10-CM

## 2025-06-17 DIAGNOSIS — M79.642 LEFT HAND PAIN: ICD-10-CM

## 2025-06-17 DIAGNOSIS — M06.9 RHEUMATOID ARTHRITIS, INVOLVING UNSPECIFIED SITE, UNSPECIFIED WHETHER RHEUMATOID FACTOR PRESENT (HCC): ICD-10-CM

## 2025-06-17 DIAGNOSIS — Z99.89 CPAP (CONTINUOUS POSITIVE AIRWAY PRESSURE) DEPENDENCE: ICD-10-CM

## 2025-06-17 DIAGNOSIS — Z00.00 HEALTH CARE MAINTENANCE: ICD-10-CM

## 2025-06-17 DIAGNOSIS — K21.9 GASTROESOPHAGEAL REFLUX DISEASE, UNSPECIFIED WHETHER ESOPHAGITIS PRESENT: ICD-10-CM

## 2025-06-17 PROBLEM — L97.913 NON-PRESSURE CHRONIC ULCER OF RIGHT LOWER LEG WITH NECROSIS OF MUSCLE (HCC): Status: ACTIVE | Noted: 2025-06-17

## 2025-06-17 PROCEDURE — G2211 COMPLEX E/M VISIT ADD ON: HCPCS | Performed by: FAMILY MEDICINE

## 2025-06-17 PROCEDURE — 99214 OFFICE O/P EST MOD 30 MIN: CPT | Performed by: FAMILY MEDICINE

## 2025-06-17 PROCEDURE — G0439 PPPS, SUBSEQ VISIT: HCPCS | Performed by: FAMILY MEDICINE

## 2025-06-17 RX ORDER — CLINDAMYCIN HYDROCHLORIDE 300 MG/1
CAPSULE ORAL
COMMUNITY
Start: 2025-06-16

## 2025-06-17 RX ORDER — LIDOCAINE 50 MG/G
1 PATCH TOPICAL EVERY 12 HOURS
COMMUNITY
Start: 2025-06-01 | End: 2026-06-01

## 2025-06-17 NOTE — PATIENT INSTRUCTIONS
Medicare Preventive Visit Patient Instructions  Thank you for completing your Welcome to Medicare Visit or Medicare Annual Wellness Visit today. Your next wellness visit will be due in one year (6/18/2026).  The screening/preventive services that you may require over the next 5-10 years are detailed below. Some tests may not apply to you based off risk factors and/or age. Screening tests ordered at today's visit but not completed yet may show as past due. Also, please note that scanned in results may not display below.  Preventive Screenings:  Service Recommendations Previous Testing/Comments   Colorectal Cancer Screening  Colonoscopy    Fecal Occult Blood Test (FOBT)/Fecal Immunochemical Test (FIT)  Fecal DNA/Cologuard Test  Flexible Sigmoidoscopy Age: 45-75 years old   Colonoscopy: every 10 years (May be performed more frequently if at higher risk)  OR  FOBT/FIT: every 1 year  OR  Cologuard: every 3 years  OR  Sigmoidoscopy: every 5 years  Screening may be recommended earlier than age 45 if at higher risk for colorectal cancer. Also, an individualized decision between you and your healthcare provider will decide whether screening between the ages of 76-85 would be appropriate. Colonoscopy: 05/16/2023  FOBT/FIT: Not on file  Cologuard: Not on file  Sigmoidoscopy: Not on file    Screening Current     Prostate Cancer Screening Individualized decision between patient and health care provider in men between ages of 55-69   Medicare will cover every 12 months beginning on the day after your 50th birthday PSA: No results in last 5 years           Hepatitis C Screening Once for adults born between 1945 and 1965  More frequently in patients at high risk for Hepatitis C Hep C Antibody: 04/19/2019    Screening Current   Diabetes Screening 1-2 times per year if you're at risk for diabetes or have pre-diabetes Fasting glucose: No results in last 5 years (No results in last 5 years)  A1C: No results in last 5 years (No results  in last 5 years)  Screening Current   Cholesterol Screening Once every 5 years if you don't have a lipid disorder. May order more often based on risk factors. Lipid panel: 07/25/2024  Screening Not Indicated  History Lipid Disorder      Other Preventive Screenings Covered by Medicare:  Abdominal Aortic Aneurysm (AAA) Screening: covered once if your at risk. You're considered to be at risk if you have a family history of AAA or a male between the age of 65-75 who smoking at least 100 cigarettes in your lifetime.  Lung Cancer Screening: covers low dose CT scan once per year if you meet all of the following conditions: (1) Age 55-77; (2) No signs or symptoms of lung cancer; (3) Current smoker or have quit smoking within the last 15 years; (4) You have a tobacco smoking history of at least 20 pack years (packs per day x number of years you smoked); (5) You get a written order from a healthcare provider.  Glaucoma Screening: covered annually if you're considered high risk: (1) You have diabetes OR (2) Family history of glaucoma OR (3)  aged 50 and older OR (4)  American aged 65 and older  Osteoporosis Screening: covered every 2 years if you meet one of the following conditions: (1) Have a vertebral abnormality; (2) On glucocorticoid therapy for more than 3 months; (3) Have primary hyperparathyroidism; (4) On osteoporosis medications and need to assess response to drug therapy.  HIV Screening: covered annually if you're between the age of 15-65. Also covered annually if you are younger than 15 and older than 65 with risk factors for HIV infection. For pregnant patients, it is covered up to 3 times per pregnancy.    Immunizations:  Immunization Recommendations   Influenza Vaccine Annual influenza vaccination during flu season is recommended for all persons aged >= 6 months who do not have contraindications   Pneumococcal Vaccine   * Pneumococcal conjugate vaccine = PCV13 (Prevnar 13), PCV15  (Vaxneuvance), PCV20 (Prevnar 20)  * Pneumococcal polysaccharide vaccine = PPSV23 (Pneumovax) Adults 19-63 yo with certain risk factors or if 65+ yo  If never received any pneumonia vaccine: recommend Prevnar 20 (PCV20)  Give PCV20 if previously received 1 dose of PCV13 or PPSV23   Hepatitis B Vaccine 3 dose series if at intermediate or high risk (ex: diabetes, end stage renal disease, liver disease)   Respiratory syncytial virus (RSV) Vaccine - COVERED BY MEDICARE PART D  * RSVPreF3 (Arexvy) CDC recommends that adults 60 years of age and older may receive a single dose of RSV vaccine using shared clinical decision-making (SCDM)   Tetanus (Td) Vaccine - COST NOT COVERED BY MEDICARE PART B Following completion of primary series, a booster dose should be given every 10 years to maintain immunity against tetanus. Td may also be given as tetanus wound prophylaxis.   Tdap Vaccine - COST NOT COVERED BY MEDICARE PART B Recommended at least once for all adults. For pregnant patients, recommended with each pregnancy.   Shingles Vaccine (Shingrix) - COST NOT COVERED BY MEDICARE PART B  2 shot series recommended in those 19 years and older who have or will have weakened immune systems or those 50 years and older     Health Maintenance Due:      Topic Date Due    Colorectal Cancer Screening  05/13/2033    Hepatitis C Screening  Completed     Immunizations Due:      Topic Date Due    COVID-19 Vaccine (4 - 2024-25 season) 09/01/2024    Influenza Vaccine (Season Ended) 09/01/2025     Advance Directives   What are advance directives?  Advance directives are legal documents that state your wishes and plans for medical care. These plans are made ahead of time in case you lose your ability to make decisions for yourself. Advance directives can apply to any medical decision, such as the treatments you want, and if you want to donate organs.   What are the types of advance directives?  There are many types of advance directives, and  each state has rules about how to use them. You may choose a combination of any of the following:  Living will:  This is a written record of the treatment you want. You can also choose which treatments you do not want, which to limit, and which to stop at a certain time. This includes surgery, medicine, IV fluid, and tube feedings.   Durable power of  for healthcare (DPAHC):  This is a written record that states who you want to make healthcare choices for you when you are unable to make them for yourself. This person, called a proxy, is usually a family member or a friend. You may choose more than 1 proxy.  Do not resuscitate (DNR) order:  A DNR order is used in case your heart stops beating or you stop breathing. It is a request not to have certain forms of treatment, such as CPR. A DNR order may be included in other types of advance directives.  Medical directive:  This covers the care that you want if you are in a coma, near death, or unable to make decisions for yourself. You can list the treatments you want for each condition. Treatment may include pain medicine, surgery, blood transfusions, dialysis, IV or tube feedings, and a ventilator (breathing machine).  Values history:  This document has questions about your views, beliefs, and how you feel and think about life. This information can help others choose the care that you would choose.  Why are advance directives important?  An advance directive helps you control your care. Although spoken wishes may be used, it is better to have your wishes written down. Spoken wishes can be misunderstood, or not followed. Treatments may be given even if you do not want them. An advance directive may make it easier for your family to make difficult choices about your care.   Weight Management   Why it is important to manage your weight:  Being overweight increases your risk of health conditions such as heart disease, high blood pressure, type 2 diabetes, and  certain types of cancer. It can also increase your risk for osteoarthritis, sleep apnea, and other respiratory problems. Aim for a slow, steady weight loss. Even a small amount of weight loss can lower your risk of health problems.  How to lose weight safely:  A safe and healthy way to lose weight is to eat fewer calories and get regular exercise. You can lose up about 1 pound a week by decreasing the number of calories you eat by 500 calories each day.   Healthy meal plan for weight management:  A healthy meal plan includes a variety of foods, contains fewer calories, and helps you stay healthy. A healthy meal plan includes the following:  Eat whole-grain foods more often.  A healthy meal plan should contain fiber. Fiber is the part of grains, fruits, and vegetables that is not broken down by your body. Whole-grain foods are healthy and provide extra fiber in your diet. Some examples of whole-grain foods are whole-wheat breads and pastas, oatmeal, brown rice, and bulgur.  Eat a variety of vegetables every day.  Include dark, leafy greens such as spinach, kale, maren greens, and mustard greens. Eat yellow and orange vegetables such as carrots, sweet potatoes, and winter squash.   Eat a variety of fruits every day.  Choose fresh or canned fruit (canned in its own juice or light syrup) instead of juice. Fruit juice has very little or no fiber.  Eat low-fat dairy foods.  Drink fat-free (skim) milk or 1% milk. Eat fat-free yogurt and low-fat cottage cheese. Try low-fat cheeses such as mozzarella and other reduced-fat cheeses.  Choose meat and other protein foods that are low in fat.  Choose beans or other legumes such as split peas or lentils. Choose fish, skinless poultry (chicken or turkey), or lean cuts of red meat (beef or pork). Before you cook meat or poultry, cut off any visible fat.   Use less fat and oil.  Try baking foods instead of frying them. Add less fat, such as margarine, sour cream, regular salad  dressing and mayonnaise to foods. Eat fewer high-fat foods. Some examples of high-fat foods include french fries, doughnuts, ice cream, and cakes.  Eat fewer sweets.  Limit foods and drinks that are high in sugar. This includes candy, cookies, regular soda, and sweetened drinks.  Exercise:  Exercise at least 30 minutes per day on most days of the week. Some examples of exercise include walking, biking, dancing, and swimming. You can also fit in more physical activity by taking the stairs instead of the elevator or parking farther away from stores. Ask your healthcare provider about the best exercise plan for you.   Narcotic (Opioid) Safety    Use narcotics safely:  Take prescribed narcotics exactly as directed  Do not give narcotics to others or take narcotics that belong to someone else  Do not mix narcotics without medicines or alcohol  Do not drive or operate heavy machinery after you take the narcotic  Monitor for side effects and notify your healthcare provider if you experienced side effects such as nausea, sleepiness, itching, or trouble thinking clearly.    Manage constipation:    Constipation is the most common side effect of narcotic medicine. Constipation is when you have hard, dry bowel movements, or you go longer than usual between bowel movements. Tell your healthcare provider about all changes in your bowel movements while you are taking narcotics. He or she may recommend laxative medicine to help you have a bowel movement. He or she may also change the kind of narcotic you are taking, or change when you take it. The following are more ways you can prevent or relieve constipation:    Drink liquids as directed.  You may need to drink extra liquids to help soften and move your bowels. Ask how much liquid to drink each day and which liquids are best for you.  Eat high-fiber foods.  This may help decrease constipation by adding bulk to your bowel movements. High-fiber foods include fruits, vegetables,  whole-grain breads and cereals, and beans. Your healthcare provider or dietitian can help you create a high-fiber meal plan. Your provider may also recommend a fiber supplement if you cannot get enough fiber from food.  Exercise regularly.  Regular physical activity can help stimulate your intestines. Walking is a good exercise to prevent or relieve constipation. Ask which exercises are best for you.  Schedule a time each day to have a bowel movement.  This may help train your body to have regular bowel movements. Bend forward while you are on the toilet to help move the bowel movement out. Sit on the toilet for at least 10 minutes, even if you do not have a bowel movement.    Store narcotics safely:   Store narcotics where others cannot easily get them.  Keep them in a locked cabinet or secure area. Do not  keep them in a purse or other bag you carry with you. A person may be looking for something else and find the narcotics.  Make sure narcotics are stored out of the reach of children.  A child can easily overdose on narcotics. Narcotics may look like candy to a small child.    The best way to dispose of narcotics:      The laws vary by country and area. In the [place], the best way is to return the narcotics through a take-back program. This program is offered by the [place] Drug Enforcement Agency (CANDIDA). The following are options for using the program:  Take the narcotics to a [employer]IDA collection site.  The site is often a law enforcement center. Call your local law enforcement center for scheduled take-back days in your area. You will be given information on where to go if the collection site is in a different location.  Take the narcotics to an approved pharmacy or hospital.  A pharmacy or hospital may be set up as a collection site. You will need to ask if it is a [employer]IDA collection site if you were not directed there. A pharmacy or doctor's office may not be able to take back narcotics unless it is a [employer]IDA site.  Use  a mail-back system.  This means you are given containers to put the narcotics into. You will then mail them in the containers.  Use a take-back drop box.  This is a place to leave the narcotics at any time. People and animals will not be able to get into the box. Your local law enforcement agency can tell you where to find a drop box in your area.    Other ways to manage pain:   Ask your healthcare provider about non-narcotic medicines to control pain.  Nonprescription medicines include NSAIDs (such as ibuprofen) and acetaminophen. Prescription medicines include muscle relaxers, antidepressants, and steroids.  Pain may be managed without any medicines.  Some ways to relieve pain include massage, aromatherapy, or meditation. Physical or occupational therapy may also help.    For more information:   Drug Enforcement Administration  [address] [place] [id number]  Phone: 7- 540 - 812-9504  Web Address: https://www.deadiversion.[employer].gov/drug_disposal/     Food and Drug Administration  [address] [place] [address]  Phone: 2- 087 - 148-8917  Web Address: http://www.fda.gov   © Copyright [employer] 2018 Information is for End User's use only and may not be sold, redistributed or otherwise used for commercial purposes. All illustrations and images included in CareNotes® are the copyrighted property of [employer] or [employer]        See rheumatology and urology as directed and check left hand xray for left hand pain and rec also to eat healthy and also take all meds as directed for RA, insomnia, hyperlipidemia, gerd and use CPAP for hx of MOUNIKA. Blue folder for AWV given to patient. Labs reviewed and patient stated PSA was also ordered and is pending. Rest left hand. Call if any problems.

## 2025-06-17 NOTE — PROGRESS NOTES
Name: Kanu Potts      : 1955      MRN: [id number]  Encounter Provider: Uzair Orellana DO  Encounter Date: 2025   Encounter department: [facility] PRIMARY CARE  :  Chief Complaint   Patient presents with   • Medicare Wellness Visit     Patient Instructions   Medicare Preventive Visit Patient Instructions  Thank you for completing your Welcome to Medicare Visit or Medicare Annual Wellness Visit today. Your next wellness visit will be due in one year (2026).  The screening/preventive services that you may require over the next 5-10 years are detailed below. Some tests may not apply to you based off risk factors and/or age. Screening tests ordered at today's visit but not completed yet may show as past due. Also, please note that scanned in results may not display below.  Preventive Screenings:  Service Recommendations Previous Testing/Comments   Colorectal Cancer Screening  Colonoscopy    Fecal Occult Blood Test (FOBT)/Fecal Immunochemical Test (FIT)  Fecal DNA/Cologuard Test  Flexible Sigmoidoscopy Age: 45-75 years old   Colonoscopy: every 10 years (May be performed more frequently if at higher risk)  OR  FOBT/FIT: every 1 year  OR  Cologuard: every 3 years  OR  Sigmoidoscopy: every 5 years  Screening may be recommended earlier than age 45 if at higher risk for colorectal cancer. Also, an individualized decision between you and your healthcare provider will decide whether screening between the ages of 76-85 would be appropriate. Colonoscopy: 2023  FOBT/FIT: Not on file  Cologuard: Not on file  Sigmoidoscopy: Not on file    Screening Current     Prostate Cancer Screening Individualized decision between patient and health care provider in men between ages of 55-69   Medicare will cover every 12 months beginning on the day after your 50th birthday PSA: No results in last 5 years           Hepatitis C Screening Once for adults born between 1945 and 1965  More frequently in  patients at high risk for Hepatitis C Hep C Antibody: 04/19/2019    Screening Current   Diabetes Screening 1-2 times per year if you're at risk for diabetes or have pre-diabetes Fasting glucose: No results in last 5 years (No results in last 5 years)  A1C: No results in last 5 years (No results in last 5 years)  Screening Current   Cholesterol Screening Once every 5 years if you don't have a lipid disorder. May order more often based on risk factors. Lipid panel: 07/25/2024  Screening Not Indicated  History Lipid Disorder      Other Preventive Screenings Covered by Medicare:  Abdominal Aortic Aneurysm (AAA) Screening: covered once if your at risk. You're considered to be at risk if you have a family history of AAA or a male between the age of 65-75 who smoking at least 100 cigarettes in your lifetime.  Lung Cancer Screening: covers low dose CT scan once per year if you meet all of the following conditions: (1) Age 55-77; (2) No signs or symptoms of lung cancer; (3) Current smoker or have quit smoking within the last 15 years; (4) You have a tobacco smoking history of at least 20 pack years (packs per day x number of years you smoked); (5) You get a written order from a healthcare provider.  Glaucoma Screening: covered annually if you're considered high risk: (1) You have diabetes OR (2) Family history of glaucoma OR (3)  aged 50 and older OR (4)  American aged 65 and older  Osteoporosis Screening: covered every 2 years if you meet one of the following conditions: (1) Have a vertebral abnormality; (2) On glucocorticoid therapy for more than 3 months; (3) Have primary hyperparathyroidism; (4) On osteoporosis medications and need to assess response to drug therapy.  HIV Screening: covered annually if you're between the age of 15-65. Also covered annually if you are younger than 15 and older than 65 with risk factors for HIV infection. For pregnant patients, it is covered up to 3 times per  pregnancy.    Immunizations:  Immunization Recommendations   Influenza Vaccine Annual influenza vaccination during flu season is recommended for all persons aged >= 6 months who do not have contraindications   Pneumococcal Vaccine   * Pneumococcal conjugate vaccine = PCV13 (Prevnar 13), PCV15 (Vaxneuvance), PCV20 (Prevnar 20)  * Pneumococcal polysaccharide vaccine = PPSV23 (Pneumovax) Adults 19-63 yo with certain risk factors or if 65+ yo  If never received any pneumonia vaccine: recommend Prevnar 20 (PCV20)  Give PCV20 if previously received 1 dose of PCV13 or PPSV23   Hepatitis B Vaccine 3 dose series if at intermediate or high risk (ex: diabetes, end stage renal disease, liver disease)   Respiratory syncytial virus (RSV) Vaccine - COVERED BY MEDICARE PART D  * RSVPreF3 (Arexvy) CDC recommends that adults 60 years of age and older may receive a single dose of RSV vaccine using shared clinical decision-making (SCDM)   Tetanus (Td) Vaccine - COST NOT COVERED BY MEDICARE PART B Following completion of primary series, a booster dose should be given every 10 years to maintain immunity against tetanus. Td may also be given as tetanus wound prophylaxis.   Tdap Vaccine - COST NOT COVERED BY MEDICARE PART B Recommended at least once for all adults. For pregnant patients, recommended with each pregnancy.   Shingles Vaccine (Shingrix) - COST NOT COVERED BY MEDICARE PART B  2 shot series recommended in those 19 years and older who have or will have weakened immune systems or those 50 years and older     Health Maintenance Due:      Topic Date Due   • Colorectal Cancer Screening  05/13/2033   • Hepatitis C Screening  Completed     Immunizations Due:      Topic Date Due   • COVID-19 Vaccine (4 - 2024-25 season) 09/01/2024   • Influenza Vaccine (Season Ended) 09/01/2025     Advance Directives   What are advance directives?  Advance directives are legal documents that state your wishes and plans for medical care. These plans are  made ahead of time in case you lose your ability to make decisions for yourself. Advance directives can apply to any medical decision, such as the treatments you want, and if you want to donate organs.   What are the types of advance directives?  There are many types of advance directives, and each state has rules about how to use them. You may choose a combination of any of the following:  Living will:  This is a written record of the treatment you want. You can also choose which treatments you do not want, which to limit, and which to stop at a certain time. This includes surgery, medicine, IV fluid, and tube feedings.   Durable power of  for healthcare (DPAHC):  This is a written record that states who you want to make healthcare choices for you when you are unable to make them for yourself. This person, called a proxy, is usually a family member or a friend. You may choose more than 1 proxy.  Do not resuscitate (DNR) order:  A DNR order is used in case your heart stops beating or you stop breathing. It is a request not to have certain forms of treatment, such as CPR. A DNR order may be included in other types of advance directives.  Medical directive:  This covers the care that you want if you are in a coma, near death, or unable to make decisions for yourself. You can list the treatments you want for each condition. Treatment may include pain medicine, surgery, blood transfusions, dialysis, IV or tube feedings, and a ventilator (breathing machine).  Values history:  This document has questions about your views, beliefs, and how you feel and think about life. This information can help others choose the care that you would choose.  Why are advance directives important?  An advance directive helps you control your care. Although spoken wishes may be used, it is better to have your wishes written down. Spoken wishes can be misunderstood, or not followed. Treatments may be given even if you do not want them.  An advance directive may make it easier for your family to make difficult choices about your care.   Weight Management   Why it is important to manage your weight:  Being overweight increases your risk of health conditions such as heart disease, high blood pressure, type 2 diabetes, and certain types of cancer. It can also increase your risk for osteoarthritis, sleep apnea, and other respiratory problems. Aim for a slow, steady weight loss. Even a small amount of weight loss can lower your risk of health problems.  How to lose weight safely:  A safe and healthy way to lose weight is to eat fewer calories and get regular exercise. You can lose up about 1 pound a week by decreasing the number of calories you eat by 500 calories each day.   Healthy meal plan for weight management:  A healthy meal plan includes a variety of foods, contains fewer calories, and helps you stay healthy. A healthy meal plan includes the following:  Eat whole-grain foods more often.  A healthy meal plan should contain fiber. Fiber is the part of grains, fruits, and vegetables that is not broken down by your body. Whole-grain foods are healthy and provide extra fiber in your diet. Some examples of whole-grain foods are whole-wheat breads and pastas, oatmeal, brown rice, and bulgur.  Eat a variety of vegetables every day.  Include dark, leafy greens such as spinach, kale, maren greens, and mustard greens. Eat yellow and orange vegetables such as carrots, sweet potatoes, and winter squash.   Eat a variety of fruits every day.  Choose fresh or canned fruit (canned in its own juice or light syrup) instead of juice. Fruit juice has very little or no fiber.  Eat low-fat dairy foods.  Drink fat-free (skim) milk or 1% milk. Eat fat-free yogurt and low-fat cottage cheese. Try low-fat cheeses such as mozzarella and other reduced-fat cheeses.  Choose meat and other protein foods that are low in fat.  Choose beans or other legumes such as split peas or  lentils. Choose fish, skinless poultry (chicken or turkey), or lean cuts of red meat (beef or pork). Before you cook meat or poultry, cut off any visible fat.   Use less fat and oil.  Try baking foods instead of frying them. Add less fat, such as margarine, sour cream, regular salad dressing and mayonnaise to foods. Eat fewer high-fat foods. Some examples of high-fat foods include french fries, doughnuts, ice cream, and cakes.  Eat fewer sweets.  Limit foods and drinks that are high in sugar. This includes candy, cookies, regular soda, and sweetened drinks.  Exercise:  Exercise at least 30 minutes per day on most days of the week. Some examples of exercise include walking, biking, dancing, and swimming. You can also fit in more physical activity by taking the stairs instead of the elevator or parking farther away from stores. Ask your healthcare provider about the best exercise plan for you.   Narcotic (Opioid) Safety    Use narcotics safely:  Take prescribed narcotics exactly as directed  Do not give narcotics to others or take narcotics that belong to someone else  Do not mix narcotics without medicines or alcohol  Do not drive or operate heavy machinery after you take the narcotic  Monitor for side effects and notify your healthcare provider if you experienced side effects such as nausea, sleepiness, itching, or trouble thinking clearly.    Manage constipation:    Constipation is the most common side effect of narcotic medicine. Constipation is when you have hard, dry bowel movements, or you go longer than usual between bowel movements. Tell your healthcare provider about all changes in your bowel movements while you are taking narcotics. He or she may recommend laxative medicine to help you have a bowel movement. He or she may also change the kind of narcotic you are taking, or change when you take it. The following are more ways you can prevent or relieve constipation:    Drink liquids as directed.  You may  need to drink extra liquids to help soften and move your bowels. Ask how much liquid to drink each day and which liquids are best for you.  Eat high-fiber foods.  This may help decrease constipation by adding bulk to your bowel movements. High-fiber foods include fruits, vegetables, whole-grain breads and cereals, and beans. Your healthcare provider or dietitian can help you create a high-fiber meal plan. Your provider may also recommend a fiber supplement if you cannot get enough fiber from food.  Exercise regularly.  Regular physical activity can help stimulate your intestines. Walking is a good exercise to prevent or relieve constipation. Ask which exercises are best for you.  Schedule a time each day to have a bowel movement.  This may help train your body to have regular bowel movements. Bend forward while you are on the toilet to help move the bowel movement out. Sit on the toilet for at least 10 minutes, even if you do not have a bowel movement.    Store narcotics safely:   Store narcotics where others cannot easily get them.  Keep them in a locked cabinet or secure area. Do not  keep them in a purse or other bag you carry with you. A person may be looking for something else and find the narcotics.  Make sure narcotics are stored out of the reach of children.  A child can easily overdose on narcotics. Narcotics may look like candy to a small child.    The best way to dispose of narcotics:      The laws vary by country and area. In the [place], the best way is to return the narcotics through a take-back program. This program is offered by the [place] Drug Enforcement Agency (CANDIDA). The following are options for using the program:  Take the narcotics to a CANDIDA collection site.  The site is often a law enforcement center. Call your local law enforcement center for scheduled take-back days in your area. You will be given information on where to go if the collection site is in a different location.  Take the  narcotics to an approved pharmacy or hospital.  A pharmacy or hospital may be set up as a collection site. You will need to ask if it is a CANDIDA collection site if you were not directed there. A pharmacy or doctor's office may not be able to take back narcotics unless it is a CANDIDA site.  Use a mail-back system.  This means you are given containers to put the narcotics into. You will then mail them in the containers.  Use a take-back drop box.  This is a place to leave the narcotics at any time. People and animals will not be able to get into the box. Your local law enforcement agency can tell you where to find a drop box in your area.    Other ways to manage pain:   Ask your healthcare provider about non-narcotic medicines to control pain.  Nonprescription medicines include NSAIDs (such as ibuprofen) and acetaminophen. Prescription medicines include muscle relaxers, antidepressants, and steroids.  Pain may be managed without any medicines.  Some ways to relieve pain include massage, aromatherapy, or meditation. Physical or occupational therapy may also help.    For more information:   Drug Enforcement Administration  [address] [place] [address]  Phone: 7- 172 - 202-7702  Web Address: https://www.deadiversion.vitalclipoChip Estimate.gov/drug_disposal/    [place] Food and Drug Administration  [address] [place] [address]  Phone: 9- 233 - 750-6278  Web Address: http://www.fda.gov   © Copyright [employer] 2018 Information is for End User's use only and may not be sold, redistributed or otherwise used for commercial purposes. All illustrations and images included in CareNotes® are the copyrighted property of A.D.A.M., Inc. or [employer]        See rheumatology and urology as directed and check left hand xray for left hand pain and rec also to eat healthy and also take all meds as directed for RA, insomnia, hyperlipidemia, gerd and use CPAP for hx of MOUNIKA. Blue folder for AWV given to patient.  Labs reviewed and patient stated PSA was also ordered and is pending. Rest left hand. Call if any problems.     Assessment & Plan  Health care maintenance  UTD with AWV and also home is safe and adl's intact and no memory concerns.        Rheumatoid arthritis, involving unspecified site, unspecified whether rheumatoid factor present (HCC)  Sees rheumatology   Orders:  •  XR hand 3+ vw left; Future    Insomnia, unspecified type  Stable on med       Hyperlipidemia, unspecified hyperlipidemia type  Low cholesterol diet encouraged       Gastroesophageal reflux disease, unspecified whether esophagitis present  Gerd precautions encouraged       Medicare annual wellness visit, subsequent  UTD       Left hand pain  Check xray left hand  Orders:  •  XR hand 3+ vw left; Future    CPAP (continuous positive airway pressure) dependence  On CPAP and is stable       Screening for prostate cancer  Check labs  Orders:  •  PSA, Total Screen; Future       Preventive health issues were discussed with patient, and age appropriate screening tests were ordered as noted in patient's After Visit Summary. Personalized health advice and appropriate referrals for health education or preventive services given if needed, as noted in patient's After Visit Summary.    History of Present Illness     Here for AWV and adl's intact with help and also advanced care planning is UTD and also home is safe and also no memory concerns. Patient on abx for tooth infection given by dentist yesterday.        Patient Care Team:  Uzair Orellana DO as PCP - General  Uzair Orellana DO as PCP - PCP-[facility]  DO Des Hester DO Lauren Bruneio Ajaz Siddiqui, MD    Review of Systems   Constitutional: Negative.    HENT: Negative.     Eyes: Negative.    Respiratory: Negative.     Cardiovascular: Negative.    Gastrointestinal: Negative.    Endocrine: Negative.    Genitourinary: Negative.    Musculoskeletal:  Negative.    Skin: Negative.    Allergic/Immunologic: Negative.    Neurological: Negative.    Hematological: Negative.    Psychiatric/Behavioral: Negative.       Medical History Reviewed by provider this encounter:  Tobacco  Allergies  Meds  Problems  Med Hx  Surg Hx  Fam Hx       Annual Wellness Visit Questionnaire   Kanu is here for his Subsequent Wellness visit.     Health Risk Assessment:   Patient rates overall health as poor. Patient feels that their physical health rating is slightly worse. Patient is satisfied with their life. Eyesight was rated as slightly better. Hearing was rated as same. Patient feels that their emotional and mental health rating is same. Patients states they are never, rarely angry. Patient states they are often unusually tired/fatigued. Pain experienced in the last 7 days has been a lot. Patient's pain rating has been 8/10. Patient states that he has experienced no weight loss or gain in last 6 months.     Depression Screening:   PHQ-2 Score: 0      Fall Risk Screening:   In the past year, patient has experienced: no history of falling in past year      Home Safety:  Patient does not have trouble with stairs inside or outside of their home. Patient has working smoke alarms and has working carbon monoxide detector. Home safety hazards include: none.     Nutrition:   Current diet is Regular.     Medications:   Patient is currently taking over-the-counter supplements. OTC medications include: see medication list. Patient is able to manage medications.     Activities of Daily Living (ADLs)/Instrumental Activities of Daily Living (IADLs):   Walk and transfer into and out of bed and chair?: Yes  Dress and groom yourself?: Yes    Bathe or shower yourself?: Yes    Feed yourself? Yes  Do your laundry/housekeeping?: No  Manage your money, pay your bills and track your expenses?: Yes  Make your own meals?: Yes    Do your own shopping?: Yes    ADL comments: Has help    Previous  Hospitalizations:   Any hospitalizations or ED visits within the last 12 months?: Yes    How many hospitalizations have you had in the last year?: 1-2    Advance Care Planning:   Living will: Yes    Durable POA for healthcare: Yes    Advanced directive: Yes      Comments: UTD    Cognitive Screening:   Provider or family/friend/caregiver concerned regarding cognition?: No    Preventive Screenings      Cardiovascular Screening:    General: Screening Not Indicated and History Lipid Disorder      Diabetes Screening:     General: Screening Current      Colorectal Cancer Screening:     General: Screening Current      Abdominal Aortic Aneurysm (AAA) Screening:    Risk factors include: age between 65-74 yo        Lung Cancer Screening:     General: Screening Not Indicated      Hepatitis C Screening:    General: Screening Current    Immunizations:  - Immunizations due: Zoster (Shingrix)    Screening, Brief Intervention, and Referral to Treatment (SBIRT)     Screening  Typical number of drinks in a day: 0  Typical number of drinks in a week: 0  Interpretation: Low risk drinking behavior.    Review of Current Opioid Use    Opioid Risk Tool (ORT) Interpretation: Complete Opioid Risk Tool (ORT)    Social Drivers of Health     Financial Resource Strain: Low Risk  (8/14/2024)    Received from [facility]    Overall Financial Resource Strain (CARDIA)    • Difficulty of Paying Living Expenses: Not hard at all   Food Insecurity: No Food Insecurity (6/17/2025)    Nursing - Inadequate Food Risk Classification    • Worried About Running Out of Food in the Last Year: Never true    • Ran Out of Food in the Last Year: Never true   Transportation Needs: No Transportation Needs (6/17/2025)    PRAPARE - Transportation    • Lack of Transportation (Medical): No    • Lack of Transportation (Non-Medical): No   Housing Stability: Low Risk  (6/17/2025)    Housing Stability Vital Sign    • Unable to Pay for Housing in the Last Year:  "No    • Number of Times Moved in the Last Year: 0    • Homeless in the Last Year: No   Utilities: Not At Risk (6/17/2025)    [facility] Utilities    • Threatened with loss of utilities: No     No results found.    Objective   /90   Pulse 86   Temp 98.3 °F (36.8 °C)   Ht 5' 6.54\" (1.69 m)   Wt 75.3 kg (166 lb)   SpO2 92%   BMI 26.36 kg/m²     Physical Exam  Constitutional:       Appearance: He is well-developed.      Comments: overweight   HENT:      Head: Normocephalic and atraumatic.      Right Ear: External ear normal.      Left Ear: External ear normal.      Nose: Nose normal.      Mouth/Throat:      Mouth: Mucous membranes are moist.     Eyes:      Conjunctiva/sclera: Conjunctivae normal.      Pupils: Pupils are equal, round, and reactive to light.       Cardiovascular:      Rate and Rhythm: Normal rate and regular rhythm.      Pulses: Normal pulses.      Heart sounds: Normal heart sounds.   Pulmonary:      Effort: Pulmonary effort is normal.      Breath sounds: Normal breath sounds.     Musculoskeletal:         General: Normal range of motion.      Cervical back: Normal range of motion and neck supple.     Skin:     General: Skin is warm and dry.      Capillary Refill: Capillary refill takes less than 2 seconds.     Neurological:      General: No focal deficit present.      Mental Status: He is alert and oriented to person, place, and time. Mental status is at baseline.      Deep Tendon Reflexes: Reflexes are normal and symmetric.     Psychiatric:         Mood and Affect: Mood normal.         Behavior: Behavior normal.         Thought Content: Thought content normal.         Judgment: Judgment normal.       Administrative Statements   I have spent a total time of 35 minutes in caring for this patient on the day of the visit/encounter including Diagnostic results, Prognosis, Risks and benefits of tx options, Instructions for management, Patient and family education, Importance of tx compliance, Risk factor " reductions, Impressions, Counseling / Coordination of care, Documenting in the medical record, Reviewing/placing orders in the medical record (including tests, medications, and/or procedures), and Obtaining or reviewing history  .

## 2025-06-18 ENCOUNTER — HOSPITAL ENCOUNTER (OUTPATIENT)
Dept: RADIOLOGY | Facility: IMAGING CENTER | Age: 70
Discharge: HOME | End: 2025-06-18
Payer: MEDICARE

## 2025-06-18 ENCOUNTER — APPOINTMENT (OUTPATIENT)
Dept: LAB | Facility: IMAGING CENTER | Age: 70
End: 2025-06-18
Payer: MEDICARE

## 2025-06-18 DIAGNOSIS — M06.9 RHEUMATOID ARTHRITIS, INVOLVING UNSPECIFIED SITE, UNSPECIFIED WHETHER RHEUMATOID FACTOR PRESENT (HCC): ICD-10-CM

## 2025-06-18 DIAGNOSIS — M79.642 LEFT HAND PAIN: ICD-10-CM

## 2025-06-18 LAB — PSA SERPL-MCNC: 0.22 NG/ML (ref 0–4)

## 2025-06-18 PROCEDURE — G0103 PSA SCREENING: HCPCS | Performed by: FAMILY MEDICINE

## 2025-06-18 PROCEDURE — 36415 COLL VENOUS BLD VENIPUNCTURE: CPT | Performed by: FAMILY MEDICINE

## 2025-06-18 PROCEDURE — 73130 X-RAY EXAM OF HAND: CPT

## 2025-06-19 ENCOUNTER — RESULTS FOLLOW-UP (OUTPATIENT)
Dept: FAMILY MEDICINE CLINIC | Facility: CLINIC | Age: 70
End: 2025-06-19

## 2025-07-21 DIAGNOSIS — M62.838 MUSCLE SPASM: ICD-10-CM

## 2025-07-21 RX ORDER — ORPHENADRINE CITRATE 100 MG/1
100 TABLET ORAL 3 TIMES DAILY
Qty: 270 TABLET | Refills: 0 | Status: SHIPPED | OUTPATIENT
Start: 2025-07-21

## 2025-07-21 NOTE — TELEPHONE ENCOUNTER
Patient is asking for a refill of his Orphenadine medication. Last OV 6/17/25 for AWV.  Last set of labs 6/16/25. Medication was renewed

## 2025-08-12 ENCOUNTER — TELEPHONE (OUTPATIENT)
Age: 70
End: 2025-08-12

## 2025-08-20 ENCOUNTER — OFFICE VISIT (OUTPATIENT)
Dept: FAMILY MEDICINE CLINIC | Facility: CLINIC | Age: 70
End: 2025-08-20
Payer: MEDICARE

## 2025-08-20 VITALS
SYSTOLIC BLOOD PRESSURE: 130 MMHG | DIASTOLIC BLOOD PRESSURE: 70 MMHG | HEIGHT: 67 IN | TEMPERATURE: 98 F | WEIGHT: 165 LBS | OXYGEN SATURATION: 100 % | BODY MASS INDEX: 25.9 KG/M2 | HEART RATE: 65 BPM

## 2025-08-20 DIAGNOSIS — L03.115 CELLULITIS OF RIGHT LOWER EXTREMITY: Primary | ICD-10-CM

## 2025-08-20 DIAGNOSIS — F11.20 UNCOMPLICATED OPIOID DEPENDENCE (HCC): ICD-10-CM

## 2025-08-20 PROBLEM — L97.913 NON-PRESSURE CHRONIC ULCER OF RIGHT LOWER LEG WITH NECROSIS OF MUSCLE (HCC): Status: RESOLVED | Noted: 2025-06-17 | Resolved: 2025-08-20

## 2025-08-20 PROCEDURE — G2211 COMPLEX E/M VISIT ADD ON: HCPCS

## 2025-08-20 PROCEDURE — 99213 OFFICE O/P EST LOW 20 MIN: CPT

## 2025-08-20 RX ORDER — SULFAMETHOXAZOLE AND TRIMETHOPRIM 800; 160 MG/1; MG/1
1 TABLET ORAL 2 TIMES DAILY
Qty: 14 TABLET | Refills: 0 | Status: SHIPPED | OUTPATIENT
Start: 2025-08-20 | End: 2025-08-27

## 2025-08-21 PROBLEM — M96.1 LUMBAR POST-LAMINECTOMY SYNDROME: Status: ACTIVE | Noted: 2017-01-24

## 2025-08-21 PROBLEM — M19.90 ARTHRITIS: Status: ACTIVE | Noted: 2019-08-30

## 2025-08-21 PROBLEM — Z22.7 LATENT TUBERCULOSIS: Status: ACTIVE | Noted: 2017-09-13

## 2025-08-21 PROBLEM — M05.711 RHEUMATOID ARTHRITIS INVOLVING RIGHT SHOULDER WITH POSITIVE RHEUMATOID FACTOR (HCC): Status: ACTIVE | Noted: 2017-06-01

## 2025-08-21 PROBLEM — G47.61 PLMD (PERIODIC LIMB MOVEMENT DISORDER): Status: ACTIVE | Noted: 2021-12-14

## 2025-08-21 PROBLEM — M43.10 SPONDYLOLISTHESIS, GRADE 1: Status: ACTIVE | Noted: 2017-01-20

## 2025-08-21 PROBLEM — G47.39 COMPLEX SLEEP APNEA SYNDROME: Status: ACTIVE | Noted: 2020-11-12

## 2025-08-21 PROBLEM — J32.9 CHRONIC SINUSITIS: Status: ACTIVE | Noted: 2025-08-21

## 2025-08-21 PROBLEM — G47.00 INSOMNIA: Status: ACTIVE | Noted: 2017-09-29

## 2025-08-21 PROBLEM — I48.0 PAROXYSMAL ATRIAL FIBRILLATION (HCC): Status: ACTIVE | Noted: 2021-03-25

## 2025-08-21 PROBLEM — I48.92 ATRIAL FLUTTER (HCC): Status: ACTIVE | Noted: 2025-01-07

## 2025-08-21 PROBLEM — E78.2 MIXED HYPERLIPIDEMIA: Status: ACTIVE | Noted: 2021-03-25

## 2025-08-21 PROBLEM — L97.512 RIGHT FOOT ULCER, WITH FAT LAYER EXPOSED (HCC): Status: RESOLVED | Noted: 2023-03-22 | Resolved: 2025-08-21

## 2025-08-21 PROBLEM — S61.204A: Status: RESOLVED | Noted: 2022-03-24 | Resolved: 2025-08-21

## 2025-08-21 PROBLEM — I45.10 RBBB: Status: ACTIVE | Noted: 2021-03-25

## 2025-08-22 ENCOUNTER — RESULTS FOLLOW-UP (OUTPATIENT)
Dept: FAMILY MEDICINE CLINIC | Facility: CLINIC | Age: 70
End: 2025-08-22

## 2025-08-23 LAB
BACTERIA WND AEROBE CULT: ABNORMAL
GRAM STN SPEC: ABNORMAL

## (undated) DEVICE — TIBURON SPLIT SHEET: Brand: CONVERTORS

## (undated) DEVICE — 10FR FRAZIER SUCTION HANDLE: Brand: CARDINAL HEALTH

## (undated) DEVICE — CHLORAPREP HI-LITE 26ML ORANGE

## (undated) DEVICE — SUT ETHILON 4-0 PS-2 18 IN 1667H

## (undated) DEVICE — SYRINGE 10ML LL

## (undated) DEVICE — PLUMEPEN PRO 10FT

## (undated) DEVICE — KERLIX BANDAGE ROLL: Brand: KERLIX

## (undated) DEVICE — BETHLEHEM UNIVERSAL MINOR GEN: Brand: CARDINAL HEALTH

## (undated) DEVICE — GLOVE INDICATOR PI UNDERGLOVE SZ 8 BLUE

## (undated) DEVICE — COBAN 4 IN STERILE

## (undated) DEVICE — SCD SEQUENTIAL COMPRESSION COMFORT SLEEVE MEDIUM KNEE LENGTH: Brand: KENDALL SCD

## (undated) DEVICE — 2000CC GUARDIAN II: Brand: GUARDIAN

## (undated) DEVICE — OCCLUSIVE GAUZE STRIP,3% BISMUTH TRIBROMOPHENATE IN PETROLATUM BLEND: Brand: XEROFORM

## (undated) DEVICE — GAUZE SPONGES,16 PLY: Brand: CURITY

## (undated) DEVICE — NEEDLE 18 G X 1 1/2

## (undated) DEVICE — ACE WRAP 4 IN UNSTERILE

## (undated) DEVICE — BETHLEHEM UNIVERSAL  MIONR EXT: Brand: CARDINAL HEALTH

## (undated) DEVICE — TUBING SUCTION 5MM X 12 FT

## (undated) DEVICE — NEEDLE 25G X 1 1/2

## (undated) DEVICE — LIGACLIP MCA MULTIPLE CLIP APPLIERS, 20 SMALL CLIPS: Brand: LIGACLIP

## (undated) DEVICE — SUT VICRYL 2-0 SH 27 IN UNDYED J417H

## (undated) DEVICE — BLADE SAGITTAL 25.6 X 9.5MM

## (undated) DEVICE — SYRINGE 3ML LL

## (undated) DEVICE — GLOVE SRG BIOGEL 7.5

## (undated) DEVICE — SPONGE LAP 18 X 18 IN STRL RFD

## (undated) DEVICE — INTENDED FOR TISSUE SEPARATION, AND OTHER PROCEDURES THAT REQUIRE A SHARP SURGICAL BLADE TO PUNCTURE OR CUT.: Brand: BARD-PARKER ® CARBON RIB-BACK BLADES

## (undated) DEVICE — LIGHT HANDLE COVER SLEEVE DISP BLUE STELLAR

## (undated) DEVICE — CUFF TOURNIQUET 18 X 4 IN QUICK CONNECT DISP 1 BLADDER

## (undated) DEVICE — DRAPE SHEET X-LG

## (undated) DEVICE — COVER PROBE INTRAOPERATIVE 6 X 96 IN

## (undated) DEVICE — 3M™ STERI-STRIP™ REINFORCED ADHESIVE SKIN CLOSURES, R1542, 1/4 IN X 1-1/2 IN (6 MM X 38 MM), 6 STRIPS/ENVELOPE: Brand: 3M™ STERI-STRIP™

## (undated) DEVICE — GLOVE SRG BIOGEL ECLIPSE 8

## (undated) DEVICE — CAST PADDING 4 IN SYNTHETIC NON-STRL

## (undated) DEVICE — SUT VICRYL 4-0 PS-2 27 IN J426H

## (undated) DEVICE — ACE WRAP 6 IN UNSTERILE

## (undated) DEVICE — TONGUE DEPRESSOR STERILE

## (undated) DEVICE — ULTRASOUND GEL STERILE FOIL PK

## (undated) DEVICE — DRAPE SHEET THREE QUARTER

## (undated) DEVICE — SPECIMEN CONTAINER STERILE PEEL PACK

## (undated) DEVICE — INTENDED FOR TISSUE SEPARATION, AND OTHER PROCEDURES THAT REQUIRE A SHARP SURGICAL BLADE TO PUNCTURE OR CUT.: Brand: BARD-PARKER SAFETY BLADES SIZE 11, STERILE

## (undated) DEVICE — STOCKINETTE REGULAR